# Patient Record
Sex: FEMALE | Race: WHITE | NOT HISPANIC OR LATINO | Employment: OTHER | URBAN - METROPOLITAN AREA
[De-identification: names, ages, dates, MRNs, and addresses within clinical notes are randomized per-mention and may not be internally consistent; named-entity substitution may affect disease eponyms.]

---

## 2017-04-06 PROBLEM — N18.6 END STAGE RENAL DISEASE: Status: ACTIVE | Noted: 2017-04-06

## 2017-04-06 PROBLEM — T82.590A MECHANICAL COMPLICATION OF ARTERIOVENOUS FISTULA SURGICALLY CREATED: Status: ACTIVE | Noted: 2017-04-06

## 2017-04-10 PROBLEM — T82.590A MECHANICAL COMPLICATION OF ARTERIOVENOUS FISTULA SURGICALLY CREATED: Status: ACTIVE | Noted: 2017-04-10

## 2017-09-01 ENCOUNTER — TELEPHONE (OUTPATIENT)
Dept: SMOKING CESSATION | Facility: CLINIC | Age: 52
End: 2017-09-01

## 2017-09-01 NOTE — TELEPHONE ENCOUNTER
I called patient to reschedule her intake as she was a no show but had to leave a message with her mother.

## 2017-12-12 PROBLEM — Z86.0100 HISTORY OF COLONIC POLYPS: Status: ACTIVE | Noted: 2017-12-12

## 2017-12-12 PROBLEM — Z86.010 HISTORY OF COLONIC POLYPS: Status: ACTIVE | Noted: 2017-12-12

## 2017-12-14 ENCOUNTER — CLINICAL SUPPORT (OUTPATIENT)
Dept: SMOKING CESSATION | Facility: CLINIC | Age: 52
End: 2017-12-14
Payer: COMMERCIAL

## 2017-12-14 DIAGNOSIS — F17.210 MODERATE SMOKER (20 OR LESS PER DAY): Primary | ICD-10-CM

## 2017-12-14 PROCEDURE — 99999 PR PBB SHADOW E&M-EST. PATIENT-LVL II: CPT | Mod: PBBFAC,,,

## 2017-12-14 PROCEDURE — 99404 PREV MED CNSL INDIV APPRX 60: CPT | Mod: S$GLB,,, | Performed by: GENERAL PRACTICE

## 2017-12-14 RX ORDER — DM/P-EPHED/ACETAMINOPH/DOXYLAM 30-7.5/3
LIQUID (ML) ORAL
Qty: 72 LOZENGE | Refills: 0 | Status: SHIPPED | OUTPATIENT
Start: 2017-12-14 | End: 2018-03-22

## 2017-12-14 RX ORDER — IBUPROFEN 200 MG
1 TABLET ORAL DAILY
Qty: 14 PATCH | Refills: 0 | Status: SHIPPED | OUTPATIENT
Start: 2017-12-14 | End: 2018-03-22 | Stop reason: DRUGHIGH

## 2017-12-18 ENCOUNTER — TELEPHONE (OUTPATIENT)
Dept: SMOKING CESSATION | Facility: CLINIC | Age: 52
End: 2017-12-18

## 2018-01-03 ENCOUNTER — CLINICAL SUPPORT (OUTPATIENT)
Dept: SMOKING CESSATION | Facility: CLINIC | Age: 53
End: 2018-01-03
Payer: COMMERCIAL

## 2018-01-03 DIAGNOSIS — F17.210 MODERATE SMOKER (20 OR LESS PER DAY): Primary | ICD-10-CM

## 2018-01-03 PROCEDURE — 99407 BEHAV CHNG SMOKING > 10 MIN: CPT | Mod: S$GLB,,, | Performed by: GENERAL PRACTICE

## 2018-01-03 PROCEDURE — 99999 PR PBB SHADOW E&M-EST. PATIENT-LVL II: CPT | Mod: PBBFAC,,,

## 2018-01-11 ENCOUNTER — CLINICAL SUPPORT (OUTPATIENT)
Dept: SMOKING CESSATION | Facility: CLINIC | Age: 53
End: 2018-01-11
Payer: COMMERCIAL

## 2018-01-11 DIAGNOSIS — F17.210 MODERATE SMOKER (20 OR LESS PER DAY): Primary | ICD-10-CM

## 2018-01-11 PROCEDURE — 99999 PR PBB SHADOW E&M-EST. PATIENT-LVL II: CPT | Mod: PBBFAC,,,

## 2018-01-11 PROCEDURE — 99407 BEHAV CHNG SMOKING > 10 MIN: CPT | Mod: S$GLB,,, | Performed by: GENERAL PRACTICE

## 2018-01-19 ENCOUNTER — CLINICAL SUPPORT (OUTPATIENT)
Dept: SMOKING CESSATION | Facility: CLINIC | Age: 53
End: 2018-01-19
Payer: COMMERCIAL

## 2018-01-19 DIAGNOSIS — F17.210 MODERATE SMOKER (20 OR LESS PER DAY): Primary | ICD-10-CM

## 2018-01-19 PROCEDURE — 99999 PR PBB SHADOW E&M-EST. PATIENT-LVL II: CPT | Mod: PBBFAC,,,

## 2018-01-19 PROCEDURE — 99406 BEHAV CHNG SMOKING 3-10 MIN: CPT | Mod: S$GLB,,, | Performed by: GENERAL PRACTICE

## 2018-01-25 ENCOUNTER — TELEPHONE (OUTPATIENT)
Dept: SMOKING CESSATION | Facility: CLINIC | Age: 53
End: 2018-01-25

## 2018-02-01 ENCOUNTER — TELEPHONE (OUTPATIENT)
Dept: SMOKING CESSATION | Facility: CLINIC | Age: 53
End: 2018-02-01

## 2018-02-21 ENCOUNTER — TELEPHONE (OUTPATIENT)
Dept: SMOKING CESSATION | Facility: CLINIC | Age: 53
End: 2018-02-21

## 2018-02-21 NOTE — TELEPHONE ENCOUNTER
Patient is currently smoking 6-8 cigarettes per day and can not come to group due to new job but will call me to schedule an [id number] for next week.

## 2018-03-22 ENCOUNTER — CLINICAL SUPPORT (OUTPATIENT)
Dept: SMOKING CESSATION | Facility: CLINIC | Age: 53
End: 2018-03-22
Payer: COMMERCIAL

## 2018-03-22 DIAGNOSIS — F17.210 MODERATE SMOKER (20 OR LESS PER DAY): Primary | ICD-10-CM

## 2018-03-22 PROCEDURE — 99407 BEHAV CHNG SMOKING > 10 MIN: CPT | Mod: S$GLB,,, | Performed by: GENERAL PRACTICE

## 2018-03-22 PROCEDURE — 99999 PR PBB SHADOW E&M-EST. PATIENT-LVL II: CPT | Mod: PBBFAC,,,

## 2018-03-22 RX ORDER — MICONAZOLE NITRATE 2 %
CREAM (GRAM) TOPICAL
Qty: 100 EACH | Refills: 0 | Status: SHIPPED | OUTPATIENT
Start: 2018-03-22 | End: 2019-04-03

## 2018-03-22 RX ORDER — IBUPROFEN 200 MG
1 TABLET ORAL DAILY
Qty: 14 PATCH | Refills: 0 | Status: SHIPPED | OUTPATIENT
Start: 2018-03-22 | End: 2018-09-14

## 2018-04-04 ENCOUNTER — TELEPHONE (OUTPATIENT)
Dept: SMOKING CESSATION | Facility: CLINIC | Age: 53
End: 2018-04-04

## 2018-12-07 ENCOUNTER — CLINICAL SUPPORT (OUTPATIENT)
Dept: SMOKING CESSATION | Facility: CLINIC | Age: 53
End: 2018-12-07
Payer: COMMERCIAL

## 2018-12-07 DIAGNOSIS — F17.200 NICOTINE DEPENDENCE: Primary | ICD-10-CM

## 2018-12-07 PROCEDURE — 99407 BEHAV CHNG SMOKING > 10 MIN: CPT | Mod: S$GLB,,,

## 2018-12-07 NOTE — PROGRESS NOTES
Called pt to f/u on her 3, 6, and 12 month smoking cessation quit status. Pt stated she is still smoking. Informed her she has benefits available and is able to rejoin. Pt not ready to make appointment. She will call back when ready. Informed her of benefit period, phone follow ups, and contact information. Will complete smart form and resolve episode.

## 2019-04-03 PROBLEM — F17.200 SMOKER: Status: ACTIVE | Noted: 2019-04-03

## 2019-04-18 PROBLEM — Z99.2 PERITONEAL DIALYSIS STATUS: Status: ACTIVE | Noted: 2019-04-18

## 2019-07-08 PROBLEM — N12 PYELONEPHRITIS: Status: ACTIVE | Noted: 2019-07-08

## 2019-07-08 PROBLEM — K65.9 PERITONITIS: Status: ACTIVE | Noted: 2019-07-08

## 2019-07-13 PROBLEM — R78.81 BACTEREMIA: Status: ACTIVE | Noted: 2019-07-13

## 2019-07-16 PROBLEM — B96.20 E. COLI PYELONEPHRITIS: Status: ACTIVE | Noted: 2019-07-08

## 2019-07-16 PROBLEM — B96.20 E COLI BACTEREMIA: Status: ACTIVE | Noted: 2019-07-13

## 2019-08-19 PROBLEM — Z87.891 FORMER SMOKER: Status: ACTIVE | Noted: 2019-08-19

## 2019-11-23 PROBLEM — L03.311 CELLULITIS OF ABDOMINAL WALL: Status: ACTIVE | Noted: 2019-11-23

## 2019-11-23 PROBLEM — I10 ESSENTIAL HYPERTENSION: Status: ACTIVE | Noted: 2019-11-23

## 2019-11-26 PROBLEM — L02.211 ABSCESS OF ABDOMINAL WALL: Status: ACTIVE | Noted: 2019-11-23

## 2020-06-17 PROBLEM — Z99.2 PERITONEAL DIALYSIS STATUS: Status: RESOLVED | Noted: 2019-04-18 | Resolved: 2020-06-17

## 2020-06-17 PROBLEM — L02.211 ABSCESS OF ABDOMINAL WALL: Status: RESOLVED | Noted: 2019-11-23 | Resolved: 2020-06-17

## 2020-08-03 PROBLEM — S62.322D: Status: ACTIVE | Noted: 2020-08-03

## 2020-08-03 PROBLEM — S62.022K CLOSED DISPLACED FRACTURE OF MIDDLE THIRD OF SCAPHOID OF LEFT WRIST WITH NONUNION: Status: ACTIVE | Noted: 2020-08-03

## 2020-08-03 PROBLEM — G56.02 LEFT CARPAL TUNNEL SYNDROME: Status: ACTIVE | Noted: 2020-08-03

## 2021-01-09 ENCOUNTER — IMMUNIZATION (OUTPATIENT)
Dept: FAMILY MEDICINE | Facility: CLINIC | Age: 56
End: 2021-01-09
Payer: MEDICARE

## 2021-01-09 DIAGNOSIS — Z23 NEED FOR VACCINATION: ICD-10-CM

## 2021-01-09 PROCEDURE — 91300 COVID-19, MRNA, LNP-S, PF, 30 MCG/0.3 ML DOSE VACCINE: CPT | Mod: PBBFAC,PO

## 2021-01-25 PROBLEM — Z86.010 HX OF COLONIC POLYPS: Status: ACTIVE | Noted: 2021-01-25

## 2021-01-25 PROBLEM — Z86.0100 HX OF COLONIC POLYPS: Status: ACTIVE | Noted: 2021-01-25

## 2021-01-30 ENCOUNTER — IMMUNIZATION (OUTPATIENT)
Dept: FAMILY MEDICINE | Facility: CLINIC | Age: 56
End: 2021-01-30
Payer: MEDICARE

## 2021-01-30 DIAGNOSIS — Z23 NEED FOR VACCINATION: Primary | ICD-10-CM

## 2021-01-30 PROCEDURE — 0002A COVID-19, MRNA, LNP-S, PF, 30 MCG/0.3 ML DOSE VACCINE: CPT | Mod: PBBFAC | Performed by: INTERNAL MEDICINE

## 2021-01-30 PROCEDURE — 91300 COVID-19, MRNA, LNP-S, PF, 30 MCG/0.3 ML DOSE VACCINE: CPT | Mod: PBBFAC | Performed by: INTERNAL MEDICINE

## 2021-06-03 ENCOUNTER — OFFICE VISIT (OUTPATIENT)
Dept: ORTHOPEDICS | Facility: CLINIC | Age: 56
End: 2021-06-03
Payer: MEDICARE

## 2021-06-03 ENCOUNTER — HOSPITAL ENCOUNTER (OUTPATIENT)
Dept: RADIOLOGY | Facility: HOSPITAL | Age: 56
Discharge: HOME | End: 2021-06-03
Attending: ORTHOPAEDIC SURGERY
Payer: MEDICARE

## 2021-06-03 VITALS — WEIGHT: 134 LBS | HEIGHT: 67 IN | BODY MASS INDEX: 21.03 KG/M2

## 2021-06-03 DIAGNOSIS — M79.631 RIGHT FOREARM PAIN: Primary | ICD-10-CM

## 2021-06-03 DIAGNOSIS — M79.631 RIGHT FOREARM PAIN: ICD-10-CM

## 2021-06-03 PROCEDURE — 3008F PR BODY MASS INDEX (BMI) DOCUMENTED: ICD-10-PCS | Mod: CPTII,S$GLB,, | Performed by: ORTHOPAEDIC SURGERY

## 2021-06-03 PROCEDURE — 99999 PR PBB SHADOW E&M-EST. PATIENT-LVL III: ICD-10-PCS | Mod: PBBFAC,,, | Performed by: ORTHOPAEDIC SURGERY

## 2021-06-03 PROCEDURE — 73090 XR FOREARM RIGHT: ICD-10-PCS | Mod: 26,RT,, | Performed by: RADIOLOGY

## 2021-06-03 PROCEDURE — 99499 UNLISTED E&M SERVICE: CPT | Mod: S$GLB,,, | Performed by: ORTHOPAEDIC SURGERY

## 2021-06-03 PROCEDURE — 99203 OFFICE O/P NEW LOW 30 MIN: CPT | Mod: S$GLB,,, | Performed by: ORTHOPAEDIC SURGERY

## 2021-06-03 PROCEDURE — 99499 RISK ADDL DX/OHS AUDIT: ICD-10-PCS | Mod: S$GLB,,, | Performed by: ORTHOPAEDIC SURGERY

## 2021-06-03 PROCEDURE — 1125F AMNT PAIN NOTED PAIN PRSNT: CPT | Mod: S$GLB,,, | Performed by: ORTHOPAEDIC SURGERY

## 2021-06-03 PROCEDURE — 73090 X-RAY EXAM OF FOREARM: CPT | Mod: TC,PO,RT

## 2021-06-03 PROCEDURE — 99999 PR PBB SHADOW E&M-EST. PATIENT-LVL III: CPT | Mod: PBBFAC,,, | Performed by: ORTHOPAEDIC SURGERY

## 2021-06-03 PROCEDURE — 1125F PR PAIN SEVERITY QUANTIFIED, PAIN PRESENT: ICD-10-PCS | Mod: S$GLB,,, | Performed by: ORTHOPAEDIC SURGERY

## 2021-06-03 PROCEDURE — 73090 X-RAY EXAM OF FOREARM: CPT | Mod: 26,RT,, | Performed by: RADIOLOGY

## 2021-06-03 PROCEDURE — 99203 PR OFFICE/OUTPT VISIT, NEW, LEVL III, 30-44 MIN: ICD-10-PCS | Mod: S$GLB,,, | Performed by: ORTHOPAEDIC SURGERY

## 2021-06-03 PROCEDURE — 3008F BODY MASS INDEX DOCD: CPT | Mod: CPTII,S$GLB,, | Performed by: ORTHOPAEDIC SURGERY

## 2021-06-09 ENCOUNTER — CLINICAL SUPPORT (OUTPATIENT)
Dept: URGENT CARE | Facility: CLINIC | Age: 56
End: 2021-06-09
Payer: MEDICARE

## 2021-06-09 DIAGNOSIS — Z11.52 ENCOUNTER FOR SCREENING FOR COVID-19: Primary | ICD-10-CM

## 2021-06-09 LAB
CTP QC/QA: YES
SARS-COV-2 RDRP RESP QL NAA+PROBE: NEGATIVE

## 2021-06-09 PROCEDURE — 99211 PR OFFICE/OUTPT VISIT, EST, LEVL I: ICD-10-PCS | Mod: S$GLB,CS,, | Performed by: FAMILY MEDICINE

## 2021-06-09 PROCEDURE — U0002: ICD-10-PCS | Mod: QW,S$GLB,, | Performed by: FAMILY MEDICINE

## 2021-06-09 PROCEDURE — U0002 COVID-19 LAB TEST NON-CDC: HCPCS | Mod: QW,S$GLB,, | Performed by: FAMILY MEDICINE

## 2021-06-09 PROCEDURE — 99211 OFF/OP EST MAY X REQ PHY/QHP: CPT | Mod: S$GLB,CS,, | Performed by: FAMILY MEDICINE

## 2021-07-14 PROBLEM — R06.02 SOB (SHORTNESS OF BREATH): Status: ACTIVE | Noted: 2021-07-14

## 2021-10-13 ENCOUNTER — IMMUNIZATION (OUTPATIENT)
Dept: FAMILY MEDICINE | Facility: CLINIC | Age: 56
End: 2021-10-13
Payer: MEDICARE

## 2021-10-13 DIAGNOSIS — Z23 NEED FOR VACCINATION: Primary | ICD-10-CM

## 2021-10-13 PROCEDURE — 91300 COVID-19, MRNA, LNP-S, PF, 30 MCG/0.3 ML DOSE VACCINE: CPT | Mod: PBBFAC | Performed by: INTERNAL MEDICINE

## 2021-10-13 PROCEDURE — 0003A COVID-19, MRNA, LNP-S, PF, 30 MCG/0.3 ML DOSE VACCINE: CPT | Mod: PBBFAC | Performed by: INTERNAL MEDICINE

## 2022-02-14 ENCOUNTER — TELEPHONE (OUTPATIENT)
Dept: SPINE | Facility: CLINIC | Age: 57
End: 2022-02-14
Payer: MEDICARE

## 2022-02-15 ENCOUNTER — TELEPHONE (OUTPATIENT)
Dept: SPINE | Facility: CLINIC | Age: 57
End: 2022-02-15
Payer: MEDICARE

## 2022-02-15 NOTE — TELEPHONE ENCOUNTER
KATHERINE FROM [place] WANTED TO LET YOU KNOW THAT THE PATIENT OF DR. LOYA'S BLOOD PRESSURE /47.   Patient returned call and left a message.  I returned her call [facility] to call [phone].

## 2022-02-25 ENCOUNTER — OFFICE VISIT (OUTPATIENT)
Dept: SPINE | Facility: CLINIC | Age: 57
End: 2022-02-25
Payer: MEDICARE

## 2022-02-25 VITALS
HEIGHT: 67 IN | SYSTOLIC BLOOD PRESSURE: 96 MMHG | BODY MASS INDEX: 17.54 KG/M2 | HEART RATE: 74 BPM | DIASTOLIC BLOOD PRESSURE: 57 MMHG | WEIGHT: 111.75 LBS

## 2022-02-25 DIAGNOSIS — M54.9 DORSALGIA, UNSPECIFIED: ICD-10-CM

## 2022-02-25 DIAGNOSIS — M54.16 LUMBAR RADICULOPATHY: ICD-10-CM

## 2022-02-25 DIAGNOSIS — M54.42 CHRONIC LEFT-SIDED LOW BACK PAIN WITH LEFT-SIDED SCIATICA: Primary | ICD-10-CM

## 2022-02-25 DIAGNOSIS — G89.29 CHRONIC BILATERAL LOW BACK PAIN WITHOUT SCIATICA: ICD-10-CM

## 2022-02-25 DIAGNOSIS — M54.50 CHRONIC BILATERAL LOW BACK PAIN WITHOUT SCIATICA: ICD-10-CM

## 2022-02-25 DIAGNOSIS — G89.29 CHRONIC LEFT-SIDED LOW BACK PAIN WITH LEFT-SIDED SCIATICA: Primary | ICD-10-CM

## 2022-02-25 PROCEDURE — 1159F MED LIST DOCD IN RCRD: CPT | Mod: CPTII,S$GLB,, | Performed by: PHYSICIAN ASSISTANT

## 2022-02-25 PROCEDURE — 3078F DIAST BP <80 MM HG: CPT | Mod: CPTII,S$GLB,, | Performed by: PHYSICIAN ASSISTANT

## 2022-02-25 PROCEDURE — 3008F PR BODY MASS INDEX (BMI) DOCUMENTED: ICD-10-PCS | Mod: CPTII,S$GLB,, | Performed by: PHYSICIAN ASSISTANT

## 2022-02-25 PROCEDURE — 3078F PR MOST RECENT DIASTOLIC BLOOD PRESSURE < 80 MM HG: ICD-10-PCS | Mod: CPTII,S$GLB,, | Performed by: PHYSICIAN ASSISTANT

## 2022-02-25 PROCEDURE — 99204 PR OFFICE/OUTPT VISIT, NEW, LEVL IV, 45-59 MIN: ICD-10-PCS | Mod: S$GLB,,, | Performed by: PHYSICIAN ASSISTANT

## 2022-02-25 PROCEDURE — 99999 PR PBB SHADOW E&M-EST. PATIENT-LVL V: ICD-10-PCS | Mod: PBBFAC,,, | Performed by: PHYSICIAN ASSISTANT

## 2022-02-25 PROCEDURE — 1159F PR MEDICATION LIST DOCUMENTED IN MEDICAL RECORD: ICD-10-PCS | Mod: CPTII,S$GLB,, | Performed by: PHYSICIAN ASSISTANT

## 2022-02-25 PROCEDURE — 1160F PR REVIEW ALL MEDS BY PRESCRIBER/CLIN PHARMACIST DOCUMENTED: ICD-10-PCS | Mod: CPTII,S$GLB,, | Performed by: PHYSICIAN ASSISTANT

## 2022-02-25 PROCEDURE — 3074F PR MOST RECENT SYSTOLIC BLOOD PRESSURE < 130 MM HG: ICD-10-PCS | Mod: CPTII,S$GLB,, | Performed by: PHYSICIAN ASSISTANT

## 2022-02-25 PROCEDURE — 99999 PR PBB SHADOW E&M-EST. PATIENT-LVL V: CPT | Mod: PBBFAC,,, | Performed by: PHYSICIAN ASSISTANT

## 2022-02-25 PROCEDURE — 1160F RVW MEDS BY RX/DR IN RCRD: CPT | Mod: CPTII,S$GLB,, | Performed by: PHYSICIAN ASSISTANT

## 2022-02-25 PROCEDURE — 99204 OFFICE O/P NEW MOD 45 MIN: CPT | Mod: S$GLB,,, | Performed by: PHYSICIAN ASSISTANT

## 2022-02-25 PROCEDURE — 3074F SYST BP LT 130 MM HG: CPT | Mod: CPTII,S$GLB,, | Performed by: PHYSICIAN ASSISTANT

## 2022-02-25 PROCEDURE — 3008F BODY MASS INDEX DOCD: CPT | Mod: CPTII,S$GLB,, | Performed by: PHYSICIAN ASSISTANT

## 2022-02-26 ENCOUNTER — HOSPITAL ENCOUNTER (OUTPATIENT)
Dept: RADIOLOGY | Facility: HOSPITAL | Age: 57
Discharge: HOME | End: 2022-02-26
Attending: PHYSICIAN ASSISTANT
Payer: MEDICARE

## 2022-02-26 DIAGNOSIS — G89.29 CHRONIC LEFT-SIDED LOW BACK PAIN WITH LEFT-SIDED SCIATICA: ICD-10-CM

## 2022-02-26 DIAGNOSIS — M54.42 CHRONIC LEFT-SIDED LOW BACK PAIN WITH LEFT-SIDED SCIATICA: ICD-10-CM

## 2022-02-26 DIAGNOSIS — M54.16 LUMBAR RADICULOPATHY: ICD-10-CM

## 2022-02-26 PROCEDURE — 72148 MRI LUMBAR SPINE W/O DYE: CPT | Mod: TC,PO

## 2022-02-26 PROCEDURE — 72148 MRI LUMBAR SPINE WITHOUT CONTRAST: ICD-10-PCS | Mod: 26,,, | Performed by: RADIOLOGY

## 2022-02-26 PROCEDURE — 72148 MRI LUMBAR SPINE W/O DYE: CPT | Mod: 26,,, | Performed by: RADIOLOGY

## 2022-02-28 ENCOUNTER — TELEPHONE (OUTPATIENT)
Dept: SPINE | Facility: CLINIC | Age: 57
End: 2022-02-28
Payer: MEDICARE

## 2022-02-28 NOTE — TELEPHONE ENCOUNTER
----- Message from Tatyana Whelan sent at 2/28/2022 10:46 AM CST -----  Contact: pt  Type: Needs Medical Advice    Who: Called: Pt     Best Call Back Number: [phone]    Inquiry/Question: Pt needs a call back she states she got her MRI done on 02/26 which is earlier than expected and wants to talk to nurse see if she can be seen sooner please call her to advise  Thank you~

## 2022-02-28 NOTE — TELEPHONE ENCOUNTER
----- Message from Lina Bateman sent at 2/28/2022  3:00 PM CST -----  Contact: patient  Type:  Patient Returning Call    Who Called:  patient  Who Left Message for Patient:  Sadie  Does the patient know what this is regarding?:    Best Call Back Number:  [phone]  Additional Information:

## 2022-02-28 NOTE — PROGRESS NOTES
Back and Spine Consult    Patient ID: Vee Navarro is a 56 y.o. female.    Chief Complaint   Patient presents with    Low-back Pain     Left-sided since kidney transplant 10-, constant, intensity changes, radiates down left leg to ankle.       Review of Systems   Constitutional: Negative for activity change, appetite change, chills, fever and unexpected weight change.   HENT: Negative for tinnitus, trouble swallowing and voice change.    Respiratory: Negative for apnea, cough, chest tightness and shortness of breath.    Cardiovascular: Negative for chest pain and palpitations.   Gastrointestinal: Negative for constipation, diarrhea, nausea and vomiting.   Genitourinary: Negative for difficulty urinating, dysuria, frequency and urgency.   Musculoskeletal: Positive for back pain and myalgias. Negative for gait problem, neck pain and neck stiffness.   Skin: Negative for wound.   Neurological: Negative for dizziness, tremors, seizures, facial asymmetry, speech difficulty, weakness, light-headedness, numbness and headaches.   Psychiatric/Behavioral: Negative for confusion and decreased concentration.       Past Medical History:   Diagnosis Date    Acid reflux     Dialysis patient         Encounter for blood transfusion     End stage kidney disease     Hypertension     Peritonitis      Social History     Socioeconomic History    Marital status: Single   Tobacco Use    Smoking status: Former Smoker     Packs/day: 0.25     Years: 35.00     Pack years: 8.75     Types: Cigarettes     Quit date: 2021     Years since quittin.1    Smokeless tobacco: Never Used    Tobacco comment: quit a couple of times previously   Substance and Sexual Activity    Alcohol use: No    Drug use: No    Sexual activity: Never     Family History   Problem Relation Age of Onset    Mental illness Brother     Heart disease Maternal Grandmother     Cancer Maternal Grandfather     Stroke Paternal Grandmother      Cancer Paternal Grandfather     No Known Problems Brother     No Known Problems Mother     Hypertension Father     Suicide Father      Review of patient's allergies indicates:   Allergen Reactions    Morphine      Other reaction(s): rash, vomiting       Current Outpatient Medications:     azaTHIOprine (IMURAN) 50 mg Tab, Take by mouth., Disp: , Rfl:     cinacalcet (SENSIPAR) 30 MG Tab, TAKE 1 TABLET BY MOUTH DAILY WITH FOOD OR AFTER a meal, Disp: , Rfl:     fludrocortisone (FLORINEF) 0.1 mg Tab, 0.2 mg., Disp: , Rfl:     multivitamin (THERAGRAN) per tablet, Take 1 tablet by mouth once daily., Disp: , Rfl:     mycophenolate (MYFORTIC) 360 MG TbEC, Take 360 mg by mouth 2 (two) times daily., Disp: , Rfl:     pantoprazole (PROTONIX) 40 MG tablet, Take 40 mg by mouth once daily., Disp: , Rfl:     pregabalin (LYRICA) 100 MG capsule, Take 100 mg by mouth once daily., Disp: , Rfl:     sulfamethoxazole-trimethoprim 800-160mg (BACTRIM DS) 800-160 mg Tab, Take 1 tablet by mouth on Monday, Wednesday, and Friday of every week to prevent PCP infection., Disp: , Rfl:     tacrolimus (PROGRAF) 1 MG Cap, TAKE 8 CAPSULES BY MOUTH 2 TIMES DAILY, Disp: , Rfl:     varenicline (CHANTIX) 1 mg Tab, Take 1 tablet (1 mg total) by mouth 2 (two) times daily., Disp: 60 tablet, Rfl: 2    amLODIPine (NORVASC) 5 MG tablet, 5 mg., Disp: , Rfl:     buprenorphine-naloxone 8-2 mg (SUBOXONE) 8-2 mg, SMARTSI.5-2 Strip(s) Sublingual Daily, Disp: , Rfl:     famotidine (PEPCID) 20 MG tablet, Take 20 mg by mouth 2 (two) times daily., Disp: , Rfl:     multivit with minerals/lutein (MULTIVITAMIN 50 PLUS ORAL), ONCE DAILY, Disp: , Rfl:     nystatin (MYCOSTATIN) 100,000 unit/mL suspension, take 5 ML BY MOUTH 3 TIMES DAILY TO prevent fungal infection, Disp: , Rfl:     ondansetron (ZOFRAN-ODT) 4 MG TbDL, DISSOLVE ONE TABLET BY MOUTH EVERY 8 HOURS AS NEEDED FOR NAUSEA, Disp: , Rfl:     polyethylene glycol (GLYCOLAX) 17 gram/dose powder, MIX  "1 CAPFUL (17 GRAMS) IN LIQUID AND DRINK ONCE DAILY AS DIRECTED FOR CONSTIPATION, Disp: , Rfl:     valGANciclovir (VALCYTE) 450 mg Tab, TAKE 1 TABLET BY MOUTH DAILY TO PREVENT CMV INFECTION, Disp: , Rfl:     Current Facility-Administered Medications:     sodium chloride 0.9% flush 10 mL, 10 mL, Intravenous, PRN, Faizan Jarrell MD    Facility-Administered Medications Ordered in Other Visits:     0.9%  NaCl infusion, 20 mL/hr, Intravenous, Continuous, Yeimy Hickman MD, Last Rate: 20 mL/hr at 09/17/18 0900, 20 mL/hr at 09/17/18 0900    0.9%  NaCl infusion, , Intravenous, Continuous, Albin Javier MD, Stopped at 08/03/20 1242    lidocaine (PF) 10 mg/ml (1%) injection 2 mg, 0.2 mL, Intradermal, Once, Albin Javier MD    Vitals:    02/25/22 0928   BP: (!) 96/57   BP Location: Right arm   Patient Position: Sitting   BP Method: Small (Automatic)   Pulse: 74   Weight: 50.7 kg (111 lb 12.4 oz)   Height: 5' 7" (1.702 m)       Physical Exam  Vitals and nursing note reviewed.   Constitutional:       Appearance: She is well-developed.   HENT:      Head: Normocephalic and atraumatic.   Eyes:      Pupils: Pupils are equal, round, and reactive to light.   Cardiovascular:      Rate and Rhythm: Normal rate.   Pulmonary:      Effort: Pulmonary effort is normal.   Musculoskeletal:         General: Normal range of motion.      Cervical back: Normal range of motion and neck supple.   Skin:     General: Skin is warm and dry.   Neurological:      Mental Status: She is alert and oriented to person, place, and time.      Coordination: Finger-Nose-Finger Test, Heel to Shin Test and Romberg Test normal.      Gait: Gait is intact. Tandem walk normal.      Deep Tendon Reflexes:      Reflex Scores:       Tricep reflexes are 2+ on the right side and 2+ on the left side.       Bicep reflexes are 2+ on the right side and 2+ on the left side.       Brachioradialis reflexes are 2+ on the right side and 2+ on the left side.       " Patellar reflexes are 2+ on the right side and 2+ on the left side.       Achilles reflexes are 2+ on the right side and 2+ on the left side.  Psychiatric:         Speech: Speech normal.         Behavior: Behavior normal.         Thought Content: Thought content normal.         Judgment: Judgment normal.         Neurologic Exam     Mental Status   Oriented to person, place, and time.   Oriented to person.   Oriented to place.   Oriented to time.   Follows 3 step commands.   Attention: normal. Concentration: normal.   Speech: speech is normal   Level of consciousness: alert  Knowledge: consistent with education.   Able to name object. Able to read. Able to repeat. Able to write. Normal comprehension.     Cranial Nerves     CN II   Visual acuity: normal  Right visual field deficit: none  Left visual field deficit: none     CN III, IV, VI   Pupils are equal, round, and reactive to light.  Right pupil: Size: 3 mm. Shape: regular. Reactivity: brisk. Consensual response: intact.   Left pupil: Size: 3 mm. Shape: regular. Reactivity: brisk. Consensual response: intact.   CN III: no CN III palsy  CN VI: no CN VI palsy  Nystagmus: none   Diplopia: none  Ophthalmoparesis: none  Conjugate gaze: present    CN V   Right facial sensation deficit: none  Left facial sensation deficit: none    CN VII   Right facial weakness: none  Left facial weakness: none    CN VIII   Hearing: intact    CN IX, X   CN IX normal.   CN X normal.     CN XI   Right sternocleidomastoid strength: normal  Left sternocleidomastoid strength: normal  Right trapezius strength: normal  Left trapezius strength: normal    CN XII   Fasciculations: absent  Tongue deviation: none    Motor Exam   Muscle bulk: normal  Overall muscle tone: normal  Right arm pronator drift: absent  Left arm pronator drift: absent    Strength   Right neck flexion: 5/5  Left neck flexion: 5/5  Right neck extension: 5/5  Left neck extension: 5/5  Right deltoid: 5/5  Left deltoid:  5/5  Right biceps: 5/5  Left biceps: 5/5  Right triceps: 5/5  Left triceps: 5/5  Right wrist flexion: 5/5  Left wrist flexion: 5/5  Right wrist extension: 5/5  Left wrist extension: 5/5  Right interossei: 5/5  Left interossei: 5/5  Right abdominals: 5/5  Left abdominals: 5/5  Right iliopsoas: 5/5  Left iliopsoas: 5/5  Right quadriceps: 5/5  Left quadriceps: 5/5  Right hamstrin/5  Left hamstrin/5  Right glutei: 5/5  Left glutei: 5/5  Right anterior tibial: 5/5  Left anterior tibial: 5/5  Right posterior tibial: 5  Left posterior tibial: 5  Right peroneal: 5  Left peroneal: 5  Right gastroc: 5/  Left gastroc: 5    Sensory Exam   Right arm light touch: normal  Left arm light touch: normal  Right leg light touch: normal  Left leg light touch: normal  Right arm vibration: normal  Left arm vibration: normal  Right arm pinprick: normal  Left arm pinprick: normal    Gait, Coordination, and Reflexes     Gait  Gait: normal    Coordination   Romberg: negative  Finger to nose coordination: normal  Heel to shin coordination: normal  Tandem walking coordination: normal    Tremor   Resting tremor: absent  Intention tremor: absent  Action tremor: absent    Reflexes   Right brachioradialis: 2+  Left brachioradialis: 2+  Right biceps: 2+  Left biceps: 2+  Right triceps: 2+  Left triceps: 2+  Right patellar: 2+  Left patellar: 2+  Right achilles: 2+  Left achilles: 2+  Right Rebollar: absent  Left Rebollar: absent  Right ankle clonus: absent  Left ankle clonus: absent      Provider dictation:    56 year old female former smoker with hypertension and who is s/p left kidney transplant 10-28-21 presents to discuss lower back and leg pain.  She had lumbar spine surgery in ; recovered well with no pain after.  Current pain started in 2021.  She has pain in the lower back with radiation into the left lateral thigh/ calf to the ankle.  She denies any numbness/ tingling.  She has tried medications, PT and 20 years  ago LYNSEY.    Current medications:  Tylenol, lyrica  Physical therapy:  Undergoing 2x per week  Interventional Procedures:  20 years ago; none recently     On exam, there is 5/5 strength with 2+ DTR and no sensory deficits.  Gait and station fluid.  Denies bowel/ bladder dysfunction.  Full range of motion of the upper and lower extremities. No pain with axial facet loading.  No SI joint pain on palpation.  No pain with lumbar flexion/ extension.    Xray lumbar spine from 2/9/22 reviewed.  There is levoscoliosis and degenerative changes.  No instability.    MRI lumbar spine from 2019 reviewed.  There is L3/4 facet hypertrophy.  L4/5 facet hypertrophy and left alteral recess disk hernia.  L5/S1 left broad disk with left foraminal narrowing.    Ms. Baxter has lower back and L5 radicualr pain due to L4/5 left lateral recess disk hernia and L5/S1 disk hernia/ facet arthropathy with left foraminal narrowing.  With no improvement after PT, I recommend obtaining MRI lumbar spine and furhter considering LYNSEY.  Follow up after imaging.  She is agreeable to plan.      Visit Diagnosis:  Chronic left-sided low back pain with left-sided sciatica  -     MRI Lumbar Spine Without Contrast; Future; Expected date: 02/25/2022    Chronic bilateral low back pain without sciatica  -     Ambulatory referral/consult to [facility]    Dorsalgia, unspecified    Lumbar radiculopathy  -     MRI Lumbar Spine Without Contrast; Future; Expected date: 02/25/2022        Total time spent counseling greater than fifty percent of total visit time.  Counseling included discussion regarding imaging findings, diagnosis possibilities, treatment options, risks and benefits.   The patient had many questions regarding the options and long-term effects.

## 2022-03-07 ENCOUNTER — OFFICE VISIT (OUTPATIENT)
Dept: SPINE | Facility: CLINIC | Age: 57
End: 2022-03-07
Payer: MEDICARE

## 2022-03-07 VITALS
WEIGHT: 111.75 LBS | HEART RATE: 75 BPM | BODY MASS INDEX: 17.54 KG/M2 | HEIGHT: 67 IN | DIASTOLIC BLOOD PRESSURE: 50 MMHG | SYSTOLIC BLOOD PRESSURE: 94 MMHG

## 2022-03-07 DIAGNOSIS — M47.816 LUMBAR SPONDYLOSIS: ICD-10-CM

## 2022-03-07 DIAGNOSIS — M41.9 SCOLIOSIS, UNSPECIFIED SCOLIOSIS TYPE, UNSPECIFIED SPINAL REGION: ICD-10-CM

## 2022-03-07 DIAGNOSIS — M54.16 LUMBAR RADICULOPATHY: Primary | ICD-10-CM

## 2022-03-07 PROCEDURE — 99999 PR PBB SHADOW E&M-EST. PATIENT-LVL IV: ICD-10-PCS | Mod: PBBFAC,,, | Performed by: PHYSICIAN ASSISTANT

## 2022-03-07 PROCEDURE — 3074F PR MOST RECENT SYSTOLIC BLOOD PRESSURE < 130 MM HG: ICD-10-PCS | Mod: CPTII,S$GLB,, | Performed by: PHYSICIAN ASSISTANT

## 2022-03-07 PROCEDURE — 1160F RVW MEDS BY RX/DR IN RCRD: CPT | Mod: CPTII,S$GLB,, | Performed by: PHYSICIAN ASSISTANT

## 2022-03-07 PROCEDURE — 3008F PR BODY MASS INDEX (BMI) DOCUMENTED: ICD-10-PCS | Mod: CPTII,S$GLB,, | Performed by: PHYSICIAN ASSISTANT

## 2022-03-07 PROCEDURE — 99999 PR PBB SHADOW E&M-EST. PATIENT-LVL IV: CPT | Mod: PBBFAC,,, | Performed by: PHYSICIAN ASSISTANT

## 2022-03-07 PROCEDURE — 3074F SYST BP LT 130 MM HG: CPT | Mod: CPTII,S$GLB,, | Performed by: PHYSICIAN ASSISTANT

## 2022-03-07 PROCEDURE — 3008F BODY MASS INDEX DOCD: CPT | Mod: CPTII,S$GLB,, | Performed by: PHYSICIAN ASSISTANT

## 2022-03-07 PROCEDURE — 1159F MED LIST DOCD IN RCRD: CPT | Mod: CPTII,S$GLB,, | Performed by: PHYSICIAN ASSISTANT

## 2022-03-07 PROCEDURE — 99214 OFFICE O/P EST MOD 30 MIN: CPT | Mod: S$GLB,,, | Performed by: PHYSICIAN ASSISTANT

## 2022-03-07 PROCEDURE — 99214 PR OFFICE/OUTPT VISIT, EST, LEVL IV, 30-39 MIN: ICD-10-PCS | Mod: S$GLB,,, | Performed by: PHYSICIAN ASSISTANT

## 2022-03-07 PROCEDURE — 3078F PR MOST RECENT DIASTOLIC BLOOD PRESSURE < 80 MM HG: ICD-10-PCS | Mod: CPTII,S$GLB,, | Performed by: PHYSICIAN ASSISTANT

## 2022-03-07 PROCEDURE — 1159F PR MEDICATION LIST DOCUMENTED IN MEDICAL RECORD: ICD-10-PCS | Mod: CPTII,S$GLB,, | Performed by: PHYSICIAN ASSISTANT

## 2022-03-07 PROCEDURE — 1160F PR REVIEW ALL MEDS BY PRESCRIBER/CLIN PHARMACIST DOCUMENTED: ICD-10-PCS | Mod: CPTII,S$GLB,, | Performed by: PHYSICIAN ASSISTANT

## 2022-03-07 PROCEDURE — 3078F DIAST BP <80 MM HG: CPT | Mod: CPTII,S$GLB,, | Performed by: PHYSICIAN ASSISTANT

## 2022-03-07 NOTE — PROGRESS NOTES
Back and Spine Follow Up    Patient ID: Vee Navarro is a 56 y.o. female.    Chief Complaint   Patient presents with    Follow-up     MRI results for low back pain.       Review of Systems   Constitutional: Negative for activity change, appetite change, chills, fever and unexpected weight change.   HENT: Negative for tinnitus, trouble swallowing and voice change.    Respiratory: Negative for apnea, cough, chest tightness and shortness of breath.    Cardiovascular: Negative for chest pain and palpitations.   Gastrointestinal: Negative for constipation, diarrhea, nausea and vomiting.   Genitourinary: Negative for difficulty urinating, dysuria, frequency and urgency.   Musculoskeletal: Positive for back pain and myalgias. Negative for gait problem, neck pain and neck stiffness.   Skin: Negative for wound.   Neurological: Negative for dizziness, tremors, seizures, facial asymmetry, speech difficulty, weakness, light-headedness, numbness and headaches.   Psychiatric/Behavioral: Negative for confusion and decreased concentration.       Past Medical History:   Diagnosis Date    Acid reflux     Dialysis patient         Encounter for blood transfusion     End stage kidney disease     Hypertension     Peritonitis      Social History     Socioeconomic History    Marital status: Single   Tobacco Use    Smoking status: Former Smoker     Packs/day: 0.25     Years: 35.00     Pack years: 8.75     Types: Cigarettes     Quit date: 2021     Years since quittin.1    Smokeless tobacco: Never Used    Tobacco comment: quit a couple of times previously   Substance and Sexual Activity    Alcohol use: No    Drug use: No    Sexual activity: Never     Family History   Problem Relation Age of Onset    Mental illness Brother     Heart disease Maternal Grandmother     Cancer Maternal Grandfather     Stroke Paternal Grandmother     Cancer Paternal Grandfather     No Known Problems Brother     No Known Problems  Mother     Hypertension Father     Suicide Father      Review of patient's allergies indicates:   Allergen Reactions    Morphine      Other reaction(s): rash, vomiting       Current Outpatient Medications:     amLODIPine (NORVASC) 5 MG tablet, 5 mg., Disp: , Rfl:     azaTHIOprine (IMURAN) 50 mg Tab, Take by mouth., Disp: , Rfl:     buprenorphine-naloxone 8-2 mg (SUBOXONE) 8-2 mg, SMARTSI.5-2 Strip(s) Sublingual Daily, Disp: , Rfl:     cinacalcet (SENSIPAR) 30 MG Tab, TAKE 1 TABLET BY MOUTH DAILY WITH FOOD OR AFTER a meal, Disp: , Rfl:     famotidine (PEPCID) 20 MG tablet, Take 20 mg by mouth 2 (two) times daily., Disp: , Rfl:     fludrocortisone (FLORINEF) 0.1 mg Tab, 0.2 mg., Disp: , Rfl:     multivit with minerals/lutein (MULTIVITAMIN 50 PLUS ORAL), ONCE DAILY, Disp: , Rfl:     multivitamin (THERAGRAN) per tablet, Take 1 tablet by mouth once daily., Disp: , Rfl:     mycophenolate (MYFORTIC) 360 MG TbEC, Take 360 mg by mouth 2 (two) times daily., Disp: , Rfl:     nystatin (MYCOSTATIN) 100,000 unit/mL suspension, take 5 ML BY MOUTH 3 TIMES DAILY TO prevent fungal infection, Disp: , Rfl:     ondansetron (ZOFRAN-ODT) 4 MG TbDL, DISSOLVE ONE TABLET BY MOUTH EVERY 8 HOURS AS NEEDED FOR NAUSEA, Disp: , Rfl:     pantoprazole (PROTONIX) 40 MG tablet, Take 40 mg by mouth once daily., Disp: , Rfl:     polyethylene glycol (GLYCOLAX) 17 gram/dose powder, MIX 1 CAPFUL (17 GRAMS) IN LIQUID AND DRINK ONCE DAILY AS DIRECTED FOR CONSTIPATION, Disp: , Rfl:     pregabalin (LYRICA) 100 MG capsule, Take 100 mg by mouth once daily., Disp: , Rfl:     sulfamethoxazole-trimethoprim 800-160mg (BACTRIM DS) 800-160 mg Tab, Take 1 tablet by mouth on Monday, Wednesday, and Friday of every week to prevent PCP infection., Disp: , Rfl:     tacrolimus (PROGRAF) 1 MG Cap, TAKE 8 CAPSULES BY MOUTH 2 TIMES DAILY, Disp: , Rfl:     valGANciclovir (VALCYTE) 450 mg Tab, TAKE 1 TABLET BY MOUTH DAILY TO PREVENT CMV INFECTION, Disp: ,  "Rfl:     varenicline (CHANTIX) 1 mg Tab, Take 1 tablet (1 mg total) by mouth 2 (two) times daily., Disp: 60 tablet, Rfl: 2    Current Facility-Administered Medications:     sodium chloride 0.9% flush 10 mL, 10 mL, Intravenous, PRN, Faizan Jarrell MD    Facility-Administered Medications Ordered in Other Visits:     0.9%  NaCl infusion, 20 mL/hr, Intravenous, Continuous, Yeimy Hickman MD, Last Rate: 20 mL/hr at 09/17/18 0900, 20 mL/hr at 09/17/18 0900    0.9%  NaCl infusion, , Intravenous, Continuous, Albin Javier MD, Stopped at 08/03/20 1242    lidocaine (PF) 10 mg/ml (1%) injection 2 mg, 0.2 mL, Intradermal, Once, Albin Javier MD    Vitals:    03/07/22 1356   BP: (!) 94/50   BP Location: Right arm   Patient Position: Sitting   BP Method: Small (Automatic)   Pulse: 75   Weight: 50.7 kg (111 lb 12.4 oz)   Height: 5' 7" (1.702 m)       Physical Exam  Vitals and nursing note reviewed.   Constitutional:       Appearance: She is well-developed.   HENT:      Head: Normocephalic and atraumatic.   Eyes:      Pupils: Pupils are equal, round, and reactive to light.   Cardiovascular:      Rate and Rhythm: Normal rate.   Pulmonary:      Effort: Pulmonary effort is normal.   Musculoskeletal:         General: Normal range of motion.      Cervical back: Normal range of motion and neck supple.   Skin:     General: Skin is warm and dry.   Neurological:      Mental Status: She is alert and oriented to person, place, and time.      Coordination: Finger-Nose-Finger Test, Heel to Shin Test and Romberg Test normal.      Gait: Gait is intact. Tandem walk normal.      Deep Tendon Reflexes:      Reflex Scores:       Tricep reflexes are 2+ on the right side and 2+ on the left side.       Bicep reflexes are 2+ on the right side and 2+ on the left side.       Brachioradialis reflexes are 2+ on the right side and 2+ on the left side.       Patellar reflexes are 2+ on the right side and 2+ on the left side.       Achilles " reflexes are 2+ on the right side and 2+ on the left side.  Psychiatric:         Speech: Speech normal.         Behavior: Behavior normal.         Thought Content: Thought content normal.         Judgment: Judgment normal.         Neurologic Exam     Mental Status   Oriented to person, place, and time.   Oriented to person.   Oriented to place.   Oriented to time.   Follows 3 step commands.   Attention: normal. Concentration: normal.   Speech: speech is normal   Level of consciousness: alert  Knowledge: consistent with education.   Able to name object. Able to read. Able to repeat. Able to write. Normal comprehension.     Cranial Nerves     CN II   Visual acuity: normal  Right visual field deficit: none  Left visual field deficit: none     CN III, IV, VI   Pupils are equal, round, and reactive to light.  Right pupil: Size: 3 mm. Shape: regular. Reactivity: brisk. Consensual response: intact.   Left pupil: Size: 3 mm. Shape: regular. Reactivity: brisk. Consensual response: intact.   CN III: no CN III palsy  CN VI: no CN VI palsy  Nystagmus: none   Diplopia: none  Ophthalmoparesis: none  Conjugate gaze: present    CN V   Right facial sensation deficit: none  Left facial sensation deficit: none    CN VII   Right facial weakness: none  Left facial weakness: none    CN VIII   Hearing: intact    CN IX, X   CN IX normal.   CN X normal.     CN XI   Right sternocleidomastoid strength: normal  Left sternocleidomastoid strength: normal  Right trapezius strength: normal  Left trapezius strength: normal    CN XII   Fasciculations: absent  Tongue deviation: none    Motor Exam   Muscle bulk: normal  Overall muscle tone: normal  Right arm pronator drift: absent  Left arm pronator drift: absent    Strength   Right neck flexion: 5/5  Left neck flexion: 5/5  Right neck extension: 5/5  Left neck extension: 5/5  Right deltoid: 5/5  Left deltoid: 5/5  Right biceps: 5/5  Left biceps: 5/5  Right triceps: 5/5  Left triceps: 5/5  Right wrist  flexion: 5/5  Left wrist flexion: 5/5  Right wrist extension: 5/5  Left wrist extension: 5/5  Right interossei: 5/5  Left interossei: 5/5  Right abdominals: 5/5  Left abdominals: 5/5  Right iliopsoas: 5/5  Left iliopsoas: 5/5  Right quadriceps: 5/5  Left quadriceps: 5/5  Right hamstrin/  Left hamstrin/  Right glutei: 5/  Left glutei: 5  Right anterior tibial: 5/5  Left anterior tibial: 5/5  Right posterior tibial: 5/5  Left posterior tibial: 5/  Right peroneal: 55  Left peroneal: 5  Right gastroc: 5  Left gastroc:     Sensory Exam   Right arm light touch: normal  Left arm light touch: normal  Right leg light touch: normal  Left leg light touch: normal  Right arm vibration: normal  Left arm vibration: normal  Right arm pinprick: normal  Left arm pinprick: normal    Gait, Coordination, and Reflexes     Gait  Gait: normal    Coordination   Romberg: negative  Finger to nose coordination: normal  Heel to shin coordination: normal  Tandem walking coordination: normal    Tremor   Resting tremor: absent  Intention tremor: absent  Action tremor: absent    Reflexes   Right brachioradialis: 2+  Left brachioradialis: 2+  Right biceps: 2+  Left biceps: 2+  Right triceps: 2+  Left triceps: 2+  Right patellar: 2+  Left patellar: 2+  Right achilles: 2+  Left achilles: 2+  Right Rebollar: absent  Left Rebollar: absent  Right ankle clonus: absent  Left ankle clonus: absent      Provider dictation:    56 year old female former smoker with hypertension and who is s/p left kidney transplant 10-28-21 presents to discuss lower back and leg pain after undergoing recent imaging.   She had lumbar spine surgery in ; recovered well with no pain after.  Current pain started in 2021.  She has pain in the lower back with radiation into the left lateral thigh/ calf to the ankle.  She denies any numbness/ tingling.  She has tried medications, PT and 20 years ago LYNSEY.    No significant changes since last  visit.    Current medications:  Tylenol, lyrica  Physical therapy:  Undergoing 2x per week  Interventional Procedures:  20 years ago; none recently     On exam, there is 5/5 strength with 2+ DTR and no sensory deficits.  Gait and station fluid.  Denies bowel/ bladder dysfunction.  Full range of motion of the upper and lower extremities. No pain with axial facet loading.  No SI joint pain on palpation.  No pain with lumbar flexion/ extension.    Xray lumbar spine from 2/9/22 reviewed.  There is levoscoliosis and degenerative changes.  No instability.    MRI lumbar spine from 2019 reviewed.  There is L3/4 facet hypertrophy.  L4/5 facet hypertrophy and left alteral recess disk hernia.  L5/S1 left broad disk with left foraminal narrowing.    MRI lumbar spine from 2/26/22 reviewed.  There is levoscoliosis and degenerative changes.  L2/3 facet arthropathy with bilateral facet effusion.  L3/4 facet arthropathy with left facet effusion.  L4/5 facet arthropathy  With left foraminal narrowing and bilateral facet effusion.  L5/S1 facet hypertrophy and broad based disk with with left foraminal narrowing.    Ms. Baxter has lower back and L5 radicualr pain due to L4/5 and L5/S1 spondylosis with significant left foraminal narrowing at each level. With no improvement after PT, I recommend considering L5/S1 interlaminar LYNSEY with spread to the left at L4/5, L5/S1 as the foramen are very narrow at L4/5 and L5/S1.  I explained the procedure in detail to her.  She is very anxious and worried about being sedated instead of full anesthesia.  She would like to speak with and have a consult with pain management prior to committing to LYNSEY.  We will arrange for the appointment.  Follow up after the injection as needed.        Visit Diagnosis:  Lumbar radiculopathy  -     Ambulatory referral/consult to Pain Clinic; Future; Expected date: 03/14/2022    Lumbar spondylosis  -     Ambulatory referral/consult to Pain Clinic; Future; Expected date:  03/14/2022    Scoliosis, unspecified scoliosis type, unspecified spinal region        Total time spent counseling greater than fifty percent of total visit time.  Counseling included discussion regarding imaging findings, diagnosis possibilities, treatment options, risks and benefits.   The patient had many questions regarding the options and long-term effects.

## 2022-03-10 ENCOUNTER — TELEPHONE (OUTPATIENT)
Dept: RHEUMATOLOGY | Facility: CLINIC | Age: 57
End: 2022-03-10
Payer: MEDICARE

## 2022-03-16 ENCOUNTER — TELEPHONE (OUTPATIENT)
Dept: PAIN MEDICINE | Facility: CLINIC | Age: 57
End: 2022-03-16
Payer: MEDICARE

## 2022-03-16 NOTE — TELEPHONE ENCOUNTER
----- Message from Brenda Montaño sent at 3/16/2022 10:31 AM CDT -----  Contact: Pt at [phone]  Type:  Sooner Apoointment Request    Caller is requesting a sooner appointment.  Caller declined first available appointment listed below.  Caller will not accept being placed on the waitlist and is requesting a message be sent to doctor.    Name of Caller:    When is the first available appointment?  3/31/22  Symptoms:  Back Pain needs injection  Best Call Back Number:  [phone]  Additional Information:  Pt would like to be seen sooner than 3/31 due to back pain and needing her injection. Please call back and advise

## 2022-03-31 ENCOUNTER — OFFICE VISIT (OUTPATIENT)
Dept: PAIN MEDICINE | Facility: CLINIC | Age: 57
End: 2022-03-31
Payer: MEDICARE

## 2022-03-31 VITALS
BODY MASS INDEX: 18.75 KG/M2 | SYSTOLIC BLOOD PRESSURE: 99 MMHG | OXYGEN SATURATION: 95 % | WEIGHT: 119.5 LBS | DIASTOLIC BLOOD PRESSURE: 55 MMHG | HEART RATE: 68 BPM | HEIGHT: 67 IN

## 2022-03-31 DIAGNOSIS — M54.16 LUMBAR RADICULOPATHY: ICD-10-CM

## 2022-03-31 DIAGNOSIS — M47.816 LUMBAR SPONDYLOSIS: ICD-10-CM

## 2022-03-31 PROCEDURE — 1159F PR MEDICATION LIST DOCUMENTED IN MEDICAL RECORD: ICD-10-PCS | Mod: CPTII,S$GLB,, | Performed by: ANESTHESIOLOGY

## 2022-03-31 PROCEDURE — 3078F DIAST BP <80 MM HG: CPT | Mod: CPTII,S$GLB,, | Performed by: ANESTHESIOLOGY

## 2022-03-31 PROCEDURE — 99204 PR OFFICE/OUTPT VISIT, NEW, LEVL IV, 45-59 MIN: ICD-10-PCS | Mod: S$GLB,,, | Performed by: ANESTHESIOLOGY

## 2022-03-31 PROCEDURE — 99999 PR PBB SHADOW E&M-EST. PATIENT-LVL IV: ICD-10-PCS | Mod: PBBFAC,,, | Performed by: ANESTHESIOLOGY

## 2022-03-31 PROCEDURE — 3074F PR MOST RECENT SYSTOLIC BLOOD PRESSURE < 130 MM HG: ICD-10-PCS | Mod: CPTII,S$GLB,, | Performed by: ANESTHESIOLOGY

## 2022-03-31 PROCEDURE — 3008F BODY MASS INDEX DOCD: CPT | Mod: CPTII,S$GLB,, | Performed by: ANESTHESIOLOGY

## 2022-03-31 PROCEDURE — 3008F PR BODY MASS INDEX (BMI) DOCUMENTED: ICD-10-PCS | Mod: CPTII,S$GLB,, | Performed by: ANESTHESIOLOGY

## 2022-03-31 PROCEDURE — 1160F RVW MEDS BY RX/DR IN RCRD: CPT | Mod: CPTII,S$GLB,, | Performed by: ANESTHESIOLOGY

## 2022-03-31 PROCEDURE — 3078F PR MOST RECENT DIASTOLIC BLOOD PRESSURE < 80 MM HG: ICD-10-PCS | Mod: CPTII,S$GLB,, | Performed by: ANESTHESIOLOGY

## 2022-03-31 PROCEDURE — 99999 PR PBB SHADOW E&M-EST. PATIENT-LVL IV: CPT | Mod: PBBFAC,,, | Performed by: ANESTHESIOLOGY

## 2022-03-31 PROCEDURE — 1160F PR REVIEW ALL MEDS BY PRESCRIBER/CLIN PHARMACIST DOCUMENTED: ICD-10-PCS | Mod: CPTII,S$GLB,, | Performed by: ANESTHESIOLOGY

## 2022-03-31 PROCEDURE — 3074F SYST BP LT 130 MM HG: CPT | Mod: CPTII,S$GLB,, | Performed by: ANESTHESIOLOGY

## 2022-03-31 PROCEDURE — 1159F MED LIST DOCD IN RCRD: CPT | Mod: CPTII,S$GLB,, | Performed by: ANESTHESIOLOGY

## 2022-03-31 PROCEDURE — 99204 OFFICE O/P NEW MOD 45 MIN: CPT | Mod: S$GLB,,, | Performed by: ANESTHESIOLOGY

## 2022-03-31 NOTE — PROGRESS NOTES
[facility] New Patient Evaluation    Referred by: Una Izquierdo PA-C  Reason for referral: back pain    CC:   Chief Complaint   Patient presents with    Back Pain      No flowsheet data found.    HPI:   Vee Navarro is a 56 y.o. female who presents with back pain.  He has had this pain since 2021. Today her pain is constant, sharp over the left side of her lower back with intermittent radiation down the left.  Pain can range between 3-9/10.  Denies any numbness or weakness     History:    Current Outpatient Medications:     amLODIPine (NORVASC) 5 MG tablet, 5 mg., Disp: , Rfl:     azaTHIOprine (IMURAN) 50 mg Tab, Take by mouth., Disp: , Rfl:     buprenorphine-naloxone 8-2 mg (SUBOXONE) 8-2 mg, SMARTSI.5-2 Strip(s) Sublingual Daily, Disp: , Rfl:     cinacalcet (SENSIPAR) 30 MG Tab, TAKE 1 TABLET BY MOUTH DAILY WITH FOOD OR AFTER a meal, Disp: , Rfl:     famotidine (PEPCID) 20 MG tablet, Take 20 mg by mouth 2 (two) times daily., Disp: , Rfl:     fludrocortisone (FLORINEF) 0.1 mg Tab, 0.2 mg., Disp: , Rfl:     multivit with minerals/lutein (MULTIVITAMIN 50 PLUS ORAL), ONCE DAILY, Disp: , Rfl:     multivitamin (THERAGRAN) per tablet, Take 1 tablet by mouth once daily., Disp: , Rfl:     mycophenolate (MYFORTIC) 360 MG TbEC, Take 360 mg by mouth 2 (two) times daily., Disp: , Rfl:     nystatin (MYCOSTATIN) 100,000 unit/mL suspension, take 5 ML BY MOUTH 3 TIMES DAILY TO prevent fungal infection, Disp: , Rfl:     ondansetron (ZOFRAN-ODT) 4 MG TbDL, DISSOLVE ONE TABLET BY MOUTH EVERY 8 HOURS AS NEEDED FOR NAUSEA, Disp: , Rfl:     pantoprazole (PROTONIX) 40 MG tablet, Take 40 mg by mouth once daily., Disp: , Rfl:     polyethylene glycol (GLYCOLAX) 17 gram/dose powder, MIX 1 CAPFUL (17 GRAMS) IN LIQUID AND DRINK ONCE DAILY AS DIRECTED FOR CONSTIPATION, Disp: , Rfl:     pregabalin (LYRICA) 100 MG capsule, Take 100 mg by mouth once daily., Disp: , Rfl:     sulfamethoxazole-trimethoprim  800-160mg (BACTRIM DS) 800-160 mg Tab, Take 1 tablet by mouth on Monday, Wednesday, and Friday of every week to prevent PCP infection., Disp: , Rfl:     tacrolimus (PROGRAF) 1 MG Cap, TAKE 8 CAPSULES BY MOUTH 2 TIMES DAILY, Disp: , Rfl:     valGANciclovir (VALCYTE) 450 mg Tab, TAKE 1 TABLET BY MOUTH DAILY TO PREVENT CMV INFECTION, Disp: , Rfl:     varenicline (CHANTIX) 1 mg Tab, Take 1 tablet (1 mg total) by mouth 2 (two) times daily. (Patient taking differently: Take 1 mg by mouth 2 (two) times daily. HAS TO BE APOTEX : DO NOT GIVE PAR MANUFACTURED MEDICATION), Disp: 60 tablet, Rfl: 2    Current Facility-Administered Medications:     sodium chloride 0.9% flush 10 mL, 10 mL, Intravenous, PRN, Faizan Jarrell MD    Facility-Administered Medications Ordered in Other Visits:     0.9%  NaCl infusion, 20 mL/hr, Intravenous, Continuous, Yeimy Hickman MD, Last Rate: 20 mL/hr at 09/17/18 0900, 20 mL/hr at 09/17/18 0900    0.9%  NaCl infusion, , Intravenous, Continuous, Albin Javier MD, Stopped at 08/03/20 1242    lidocaine (PF) 10 mg/ml (1%) injection 2 mg, 0.2 mL, Intradermal, Once, Albin Javier MD    Past Medical History:   Diagnosis Date    Acid reflux     Dialysis patient     Tu Th     Encounter for blood transfusion     End stage kidney disease     Hypertension     Peritonitis        Past Surgical History:   Procedure Laterality Date    arm surgery Left     BACK SURGERY  1982    CARPAL TUNNEL RELEASE Left 8/3/2020    Procedure: RELEASE, CARPAL TUNNEL;  Surgeon: MIKO Fishman MD;  Location: [facility];  Service: Orthopedics;  Laterality: Left;    COLONOSCOPY N/A 12/12/2017    Procedure: COLONOSCOPY;  Surgeon: Elpidio Casillas Jr., MD;  Location: [facility];  Service: Endoscopy;  Laterality: N/A;    COLONOSCOPY N/A 1/25/2021    Procedure: COLONOSCOPY;  Surgeon: Elpidio Casillas Jr., MD;  Location: [facility];  Service: Endoscopy;  Laterality: N/A;    CORONARY ANGIOGRAPHY  N/A 2021    Procedure: ANGIOGRAM, CORONARY ARTERY;  Surgeon: Corey Aguilar MD;  Location: [facility] CATH;  Service: Cardiovascular;  Laterality: N/A;    DIALYSIS FISTULA CREATION      FOOT SURGERY  2019    KIDNEY STONE SURGERY  1983    KIDNEY TRANSPLANT Right 10/2021    LAPAROSCOPIC REVISION OF PROCEDURE INVOLVING PERITONEAL DIALYSIS CATHETER N/A 2018    Procedure: REVISION OF PROCEDURE INVOLVING PERITONEAL DIALYSIS CATHETER, LAPAROSCOPIC;  Surgeon: Kannan Piedra MD;  Location: [facility] OR;  Service: General;  Laterality: N/A;    LEFT HEART CATHETERIZATION Left 2021    Procedure: Left heart cath;  Surgeon: Corey Aguilar MD;  Location: [facility] CATH;  Service: Cardiovascular;  Laterality: Left;    NEUROMA SURGERY Left     knee    OPEN REDUCTION AND INTERNAL FIXATION (ORIF) OF FRACTURE OF DISTAL RADIUS Left 8/3/2020    Procedure: ORIF, FRACTURE,/ BONE GRAFT/ ORIF Scaphoid;  Surgeon: MIKO Fishman MD;  Location: [facility];  Service: Orthopedics;  Laterality: Left;  Dr. Fishman completed his surgery at 1159    OTHER SURGICAL HISTORY      peritoneal catheter move X2    PERITONEAL CATHETER REMOVAL N/A 2019    Procedure: REMOVAL, CATHETER, DIALYSIS, PERITONEAL;  Surgeon: Edilson Sherman MD;  Location: [facility] OR;  Service: General;  Laterality: N/A;    PLACEMENT PERITONEAL DIALYSIS CATHETER         Family History   Problem Relation Age of Onset    Mental illness Brother     Heart disease Maternal Grandmother     Cancer Maternal Grandfather     Stroke Paternal Grandmother     Cancer Paternal Grandfather     No Known Problems Brother     No Known Problems Mother     Hypertension Father     Suicide Father        Social History     Socioeconomic History    Marital status: Single   Tobacco Use    Smoking status: Former Smoker     Packs/day: 0.25     Years: 35.00     Pack years: 8.75     Types: Cigarettes     Quit date: 2021     Years since quittin.2    Smokeless  "tobacco: Never Used    Tobacco comment: quit a couple of times previously   Substance and Sexual Activity    Alcohol use: No    Drug use: No    Sexual activity: Never       Review of patient's allergies indicates:   Allergen Reactions    Morphine      Other reaction(s): rash, vomiting       Review of Systems:  General ROS: negative for - fever  Psychological ROS: negative for - hostility  Hematological and Lymphatic ROS: negative for - bleeding problems  Endocrine ROS: negative for - unexpected weight changes  Respiratory ROS: no cough, shortness of breath, or wheezing  Cardiovascular ROS: no chest pain or dyspnea on exertion  Gastrointestinal ROS: no abdominal pain, change in bowel habits, or black or bloody stools  Musculoskeletal ROS: negative for - muscular weakness  Neurological ROS: negative for - numbness/tingling  Dermatological ROS: negative for rash    Physical Exam:  Vitals:    03/31/22 1032   BP: (!) 99/55   Pulse: 68   SpO2: 95%   Weight: 54.2 kg (119 lb 7.8 oz)   Height: 5' 7" (1.702 m)   PainSc:   4   PainLoc: Back     Body mass index is 18.71 kg/m².    Gen: NAD  Gait: gait intact  Psych:  Mood appropriate for given condition  HEENT: eyes anicteric   GI: Abd soft  CV: RRR  Lungs: breathing unlabored   ROM: limited AROM of the L spine in all planes, full ROM at ankles, knees and hips  Lumbar flexion 90 degrees, extension 50 degrees, side bending 30 degrees.    Sensation: intact to light touch in all dermatomes tested from L2-S1 bilaterally  Tone: normal in the b/l knees and hips   Skin: intact  Extremities: No edema in b/l ankles or hands  Provacative tests:        Right Left   L2/3 Iliacus Hip flexion  5  5   L3/4 Qudratus Femoris Knee Extension  5  5   L4/5 Tib Anterior Ankle Dorsiflexion   5  5   L5/S1 Extensor Hallicus Longus Great toe extension  5  5                 S1/S2 Gastroc/Soleus Plantar Flexion  5  5       Imaging:  MRI lumbar spine 2/26/22  FINDINGS:  The marrow signal is diffusely " decreased and heterogeneous.  This is similar to what was seen on MRI from 2019.     L1/2: There are no significant degenerative changes at this level.  There is compression of the L1 vertebral body new from 2019 but without evidence of edema within the vertebral body.  L2/3: There are no significant degenerative changes at this level.  L3/4: There are no significant degenerative changes at this level.  L4/5: There is a circumferential disc bulge resulting in severe narrowing of both neural foramina and mild narrowing of the central canal.  This is similar to prior exam.  L5/S1: There is a circumferential disc bulge with small annular tear resulting in severe narrowing of bilateral neural foramina.  There is a hemangioma within the L5 vertebral body.  There is rotatory scoliosis of the lumbar spine progressed from prior exam.     There is evidence of polycystic kidney disease.    Labs:  BMP  Lab Results   Component Value Date     07/14/2021    K 5.3 (H) 07/14/2021    CL 96 07/14/2021    CO2 33 (H) 07/14/2021    BUN 36 (H) 07/14/2021    CREATININE 6.12 (H) 07/14/2021    CALCIUM 8.2 (L) 07/14/2021    ANIONGAP 11 07/14/2021    ESTGFRAFRICA 8 (A) 07/14/2021    EGFRNONAA 7 (A) 07/14/2021     Lab Results   Component Value Date    ALT 8 07/14/2021    AST 28 07/14/2021    ALKPHOS 103 07/14/2021    BILITOT 0.6 07/14/2021       Assessment:   Problem List Items Addressed This Visit    None     Visit Diagnoses     Lumbar radiculopathy        Lumbar spondylosis              56 y.o. year old female with PMH polycystic kidney disease status post renal transplant on 10/28/2021 who presents with back pain.  He has had this pain since October 2021. Today her pain is constant, sharp over the left side of her lower back with intermittent radiation down the left.  Pain can range between 3-9/10.  Denies any numbness or weakness     - on exam she has full strength and intact sensation to light touch  - I independently reviewed her  lumbar MRI is consistent with severe multilevel bilateral facet arthropathy, L4-5 circumferential disc bulge resulting in severe narrowing of both neural foramina and mild narrowing of the central canal, L5-S1 circumferential disc bulge with small annular tear resulting in severe narrowing of bilateral neural foramina  - she has a history of lumbar back surgery which 17 years old and judging by the MRI it appears it was a left L5-S1 laminectomy  - her pain is severe and limiting her mobility interfering with her ADLs.  She has multifactorial disease in her spine contributing her pain but I think her multilevel bulging discs with foraminal and lateral recess narrowing is the 1st target to address  - I offered her L4-5 LYNSEY to the left.  She has an upcoming knee surgery in the middle of April and will touch base with the orthopedist to see when it is best to get an epidural injection and follow up with us as needed when she decides    : Not applicable    Kaden Hidalgo M.D.  Interventional Pain Medicine / Anesthesiology    This note was completed with dictation software and grammatical errors may exist.

## 2022-04-07 ENCOUNTER — LAB VISIT (OUTPATIENT)
Dept: LAB | Facility: HOSPITAL | Age: 57
End: 2022-04-07
Attending: NURSE PRACTITIONER
Payer: MEDICARE

## 2022-04-07 ENCOUNTER — OFFICE VISIT (OUTPATIENT)
Dept: NEUROLOGY | Facility: CLINIC | Age: 57
End: 2022-04-07
Payer: MEDICARE

## 2022-04-07 VITALS
SYSTOLIC BLOOD PRESSURE: 107 MMHG | DIASTOLIC BLOOD PRESSURE: 60 MMHG | HEIGHT: 67 IN | RESPIRATION RATE: 18 BRPM | WEIGHT: 117.38 LBS | BODY MASS INDEX: 18.42 KG/M2 | HEART RATE: 71 BPM

## 2022-04-07 DIAGNOSIS — R41.3 SHORT-TERM MEMORY LOSS: ICD-10-CM

## 2022-04-07 DIAGNOSIS — R41.3 SHORT-TERM MEMORY LOSS: Primary | ICD-10-CM

## 2022-04-07 LAB
FOLATE SERPL-MCNC: 6.2 NG/ML (ref 4–24)
TSH SERPL DL<=0.005 MIU/L-ACNC: 1.39 UIU/ML (ref 0.4–4)
VIT B12 SERPL-MCNC: 211 PG/ML (ref 210–950)

## 2022-04-07 PROCEDURE — 99205 PR OFFICE/OUTPT VISIT, NEW, LEVL V, 60-74 MIN: ICD-10-PCS | Mod: S$GLB,,, | Performed by: NURSE PRACTITIONER

## 2022-04-07 PROCEDURE — 3074F PR MOST RECENT SYSTOLIC BLOOD PRESSURE < 130 MM HG: ICD-10-PCS | Mod: CPTII,S$GLB,, | Performed by: NURSE PRACTITIONER

## 2022-04-07 PROCEDURE — 82746 ASSAY OF FOLIC ACID SERUM: CPT | Performed by: NURSE PRACTITIONER

## 2022-04-07 PROCEDURE — 84443 ASSAY THYROID STIM HORMONE: CPT | Performed by: NURSE PRACTITIONER

## 2022-04-07 PROCEDURE — 3074F SYST BP LT 130 MM HG: CPT | Mod: CPTII,S$GLB,, | Performed by: NURSE PRACTITIONER

## 2022-04-07 PROCEDURE — 1160F RVW MEDS BY RX/DR IN RCRD: CPT | Mod: CPTII,S$GLB,, | Performed by: NURSE PRACTITIONER

## 2022-04-07 PROCEDURE — 1159F PR MEDICATION LIST DOCUMENTED IN MEDICAL RECORD: ICD-10-PCS | Mod: CPTII,S$GLB,, | Performed by: NURSE PRACTITIONER

## 2022-04-07 PROCEDURE — 3078F DIAST BP <80 MM HG: CPT | Mod: CPTII,S$GLB,, | Performed by: NURSE PRACTITIONER

## 2022-04-07 PROCEDURE — 99205 OFFICE O/P NEW HI 60 MIN: CPT | Mod: S$GLB,,, | Performed by: NURSE PRACTITIONER

## 2022-04-07 PROCEDURE — 82607 VITAMIN B-12: CPT | Performed by: NURSE PRACTITIONER

## 2022-04-07 PROCEDURE — 3078F PR MOST RECENT DIASTOLIC BLOOD PRESSURE < 80 MM HG: ICD-10-PCS | Mod: CPTII,S$GLB,, | Performed by: NURSE PRACTITIONER

## 2022-04-07 PROCEDURE — 3008F PR BODY MASS INDEX (BMI) DOCUMENTED: ICD-10-PCS | Mod: CPTII,S$GLB,, | Performed by: NURSE PRACTITIONER

## 2022-04-07 PROCEDURE — 99999 PR PBB SHADOW E&M-EST. PATIENT-LVL V: ICD-10-PCS | Mod: PBBFAC,,, | Performed by: NURSE PRACTITIONER

## 2022-04-07 PROCEDURE — 99999 PR PBB SHADOW E&M-EST. PATIENT-LVL V: CPT | Mod: PBBFAC,,, | Performed by: NURSE PRACTITIONER

## 2022-04-07 PROCEDURE — 1160F PR REVIEW ALL MEDS BY PRESCRIBER/CLIN PHARMACIST DOCUMENTED: ICD-10-PCS | Mod: CPTII,S$GLB,, | Performed by: NURSE PRACTITIONER

## 2022-04-07 PROCEDURE — 3008F BODY MASS INDEX DOCD: CPT | Mod: CPTII,S$GLB,, | Performed by: NURSE PRACTITIONER

## 2022-04-07 PROCEDURE — 1159F MED LIST DOCD IN RCRD: CPT | Mod: CPTII,S$GLB,, | Performed by: NURSE PRACTITIONER

## 2022-04-07 PROCEDURE — 36415 COLL VENOUS BLD VENIPUNCTURE: CPT | Mod: PO | Performed by: NURSE PRACTITIONER

## 2022-04-07 PROCEDURE — 86592 SYPHILIS TEST NON-TREP QUAL: CPT | Performed by: NURSE PRACTITIONER

## 2022-04-07 NOTE — ASSESSMENT & PLAN NOTE
Patient is a 57 y/o female with complaints of short term memory loss.   Onset ~ 1 year ago  There are no reports of hallucinations, behavioral changes, recent falls, urinary incontinence. She does report mild sleep disturbances and endorses mild depression but does not believe she needs to take medication for it.   MMSE today 29/30   - MCI vs normal aging process?  Obtain updated MRI brain scan. Last one in 2016  Obtain serologies  Discussed role vs expectation of cognitive enhancing medications at length.    - not warranted at this time  Consider referral for NP testing in the future.   Discussed ways to promote brain stimulation.   There are no safety concerns.

## 2022-04-07 NOTE — PROGRESS NOTES
NEUROLOGY  Outpatient CONSULT    [facility]  [address] [facility], [place] [address]  [phone] (office) / [phone] (fax)    Patient Name:  Vee Navarro  :  1965  MR #:  [id number]  Acct #:  [id number]    Date of Neurology Consult: 2022  Name of Provider: CIRILO Hinds    Other Physicians:  Evelina Gunn MD (Primary Care Physician); Jing Jimenes FNP (Referring)      Chief Complaint: Memory Loss (Short term memory ) and Gait Problem      History of Present Illness (HPI):  Vee Navarro is a right handed 56 y.o. female with a PMHX of ESRD, HTN (controlled), Kidney transplant in Oct 2021.   Patient is here today for memory loss. She is solo at today's visit. She is concerned about short term memory. She prides on not ever being a forgetful person. She often misplaces items or forgets things that are told to her. She lives at home with her mother and 2 young boys.     Patient's highest level of education completed was clinton in college. 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 She is working part time. The onset of memory issues began about 1 year ago. She struggles more short term memory loss. She believes that she is more impatient now as she lives with her 86 y/o mother and 2 teenage boys. She endorses mild depression but doesn't believe she needs medications. She denies suicidal ideation. She denies hallucinations. She states her sleep is fair and now has to sleep on her side which is a change for her. She will often wake up in the night but is able to fall back asleep fine. She believes she does well with executive function. She is managing her finances and medications well and without issues. She denies issues with hygiene and able to perform ADLs without assistance. She reports word finding difficulty but denies comprehension issues. She is hard of hearing and has seen an audiologist.   She denies issues with  object recognition. She denies urinary incontinence. Her last fall was bout 5-6 months. She is still driving and denies recent MVAs. She does report getting lost near her neighborhood stating she couldn't remember where she lived. She reports this immediately after dialysis and not feeling well that day.   She works part time for her business and helps with house work.         Past Medical, Surgical, Family & Social History:   Past Medical History:   Diagnosis Date    Acid reflux     Dialysis patient     Tu Th Sa    Encounter for blood transfusion     End stage kidney disease     Hypertension     Peritonitis       Past Surgical History:   Procedure Laterality Date    arm surgery Left     BACK SURGERY  1982    CARPAL TUNNEL RELEASE Left 8/3/2020    Procedure: RELEASE, CARPAL TUNNEL;  Surgeon: MIKO Fishman MD;  Location: [facility];  Service: Orthopedics;  Laterality: Left;    COLONOSCOPY N/A 12/12/2017    Procedure: COLONOSCOPY;  Surgeon: Elpidio Casillas Jr., MD;  Location: [facility] ENDO;  Service: Endoscopy;  Laterality: N/A;    COLONOSCOPY N/A 1/25/2021    Procedure: COLONOSCOPY;  Surgeon: Elpidio Casillas Jr., MD;  Location: [facility] ENDO;  Service: Endoscopy;  Laterality: N/A;    CORONARY ANGIOGRAPHY N/A 7/14/2021    Procedure: ANGIOGRAM, CORONARY ARTERY;  Surgeon: Corey Aguilar MD;  Location: [facility] CATH;  Service: Cardiovascular;  Laterality: N/A;    DIALYSIS FISTULA CREATION      FOOT SURGERY  03/2019    KIDNEY STONE SURGERY  1983    KIDNEY TRANSPLANT Right 10/2021    LAPAROSCOPIC REVISION OF PROCEDURE INVOLVING PERITONEAL DIALYSIS CATHETER N/A 9/17/2018    Procedure: REVISION OF PROCEDURE INVOLVING PERITONEAL DIALYSIS CATHETER, LAPAROSCOPIC;  Surgeon: Kannan Piedra MD;  Location: [facility] OR;  Service: General;  Laterality: N/A;    LEFT HEART CATHETERIZATION Left 7/14/2021    Procedure: Left heart cath;  Surgeon: Corey Aguilar MD;  Location: [facility] CATH;  Service: Cardiovascular;   Laterality: Left;    NEUROMA SURGERY Left     knee    OPEN REDUCTION AND INTERNAL FIXATION (ORIF) OF FRACTURE OF DISTAL RADIUS Left 8/3/2020    Procedure: ORIF, FRACTURE,/ BONE GRAFT/ ORIF Scaphoid;  Surgeon: MIKO Fishman MD;  Location: [facility];  Service: Orthopedics;  Laterality: Left;  Dr. Fishman completed his surgery at 1159    OTHER SURGICAL HISTORY      peritoneal catheter move X2    PERITONEAL CATHETER REMOVAL N/A 11/23/2019    Procedure: REMOVAL, CATHETER, DIALYSIS, PERITONEAL;  Surgeon: Edilson Sherman MD;  Location: [facility];  Service: General;  Laterality: N/A;    PLACEMENT PERITONEAL DIALYSIS CATHETER  2011     Family History   Problem Relation Age of Onset    Mental illness Brother     Heart disease Maternal Grandmother     Cancer Maternal Grandfather     Stroke Paternal Grandmother     Cancer Paternal Grandfather     No Known Problems Brother     No Known Problems Mother     Hypertension Father     Suicide Father      Alcohol use:  reports no history of alcohol use.   (Of note, 0.6 oz = 1 beer or 6 oz = 10 beers).  Tobacco use:  reports that she quit smoking about 15 months ago. Her smoking use included cigarettes. She has a 8.75 pack-year smoking history. She has never used smokeless tobacco.  Street drug use:  reports no history of drug use.  Allergies: Morphine.    Home Medications:     Current Outpatient Medications:     cinacalcet (SENSIPAR) 30 MG Tab, TAKE 1 TABLET BY MOUTH DAILY WITH FOOD OR AFTER a meal, Disp: , Rfl:     famotidine (PEPCID) 20 MG tablet, Take 20 mg by mouth 2 (two) times daily., Disp: , Rfl:     multivit with minerals/lutein (MULTIVITAMIN 50 PLUS ORAL), ONCE DAILY, Disp: , Rfl:     multivitamin (THERAGRAN) per tablet, Take 1 tablet by mouth once daily., Disp: , Rfl:     mycophenolate (MYFORTIC) 360 MG TbEC, Take 360 mg by mouth 2 (two) times daily., Disp: , Rfl:     ondansetron (ZOFRAN-ODT) 4 MG TbDL, DISSOLVE ONE TABLET BY MOUTH EVERY 8 HOURS AS NEEDED  FOR NAUSEA, Disp: , Rfl:     pantoprazole (PROTONIX) 40 MG tablet, Take 40 mg by mouth once daily., Disp: , Rfl:     pregabalin (LYRICA) 100 MG capsule, Take 100 mg by mouth once daily., Disp: , Rfl:     tacrolimus (PROGRAF) 1 MG Cap, TAKE 8 CAPSULES BY MOUTH 2 TIMES DAILY, Disp: , Rfl:     valGANciclovir (VALCYTE) 450 mg Tab, TAKE 1 TABLET BY MOUTH DAILY TO PREVENT CMV INFECTION, Disp: , Rfl:     varenicline (CHANTIX) 1 mg Tab, Take 1 tablet (1 mg total) by mouth 2 (two) times daily. (Patient taking differently: Take 1 mg by mouth 2 (two) times daily. HAS TO BE APOTEX : DO NOT GIVE PAR MANUFACTURED MEDICATION), Disp: 60 tablet, Rfl: 2    amLODIPine (NORVASC) 5 MG tablet, 5 mg., Disp: , Rfl:     azaTHIOprine (IMURAN) 50 mg Tab, Take by mouth., Disp: , Rfl:     buprenorphine-naloxone 8-2 mg (SUBOXONE) 8-2 mg, SMARTSI.5-2 Strip(s) Sublingual Daily, Disp: , Rfl:     fludrocortisone (FLORINEF) 0.1 mg Tab, 0.2 mg., Disp: , Rfl:     nystatin (MYCOSTATIN) 100,000 unit/mL suspension, take 5 ML BY MOUTH 3 TIMES DAILY TO prevent fungal infection, Disp: , Rfl:     polyethylene glycol (GLYCOLAX) 17 gram/dose powder, MIX 1 CAPFUL (17 GRAMS) IN LIQUID AND DRINK ONCE DAILY AS DIRECTED FOR CONSTIPATION, Disp: , Rfl:     sulfamethoxazole-trimethoprim 800-160mg (BACTRIM DS) 800-160 mg Tab, Take 1 tablet by mouth on Monday, Wednesday, and Friday of every week to prevent PCP infection., Disp: , Rfl:     Current Facility-Administered Medications:     sodium chloride 0.9% flush 10 mL, 10 mL, Intravenous, PRN, Faizan Jarrell MD    Facility-Administered Medications Ordered in Other Visits:     0.9%  NaCl infusion, 20 mL/hr, Intravenous, Continuous, Yeimy Hickman MD, Last Rate: 20 mL/hr at 18 0900, 20 mL/hr at 18 0900    0.9%  NaCl infusion, , Intravenous, Continuous, Albin Javier MD, Stopped at 20 1242    lidocaine (PF) 10 mg/ml (1%) injection 2 mg, 0.2 mL, Intradermal, Once,  "Albin Javier MD    Physical Examination:  /60 (BP Location: Right arm, Patient Position: Sitting, BP Method: Medium (Automatic))   Pulse 71   Resp 18   Ht 5' 7" (1.702 m)   Wt 53.3 kg (117 lb 6.3 oz)   LMP  (LMP Unknown)   BMI 18.39 kg/m²   MMSE 4/7/2022   What is the (year), (season), (date), (day), (month)? 5   Where are we (state), (country), (town or city), (hospital), (floor)? 5   Name 3 common objects (eg. "apple", "table", "sukh"). Take 1 second to say each. Then ask the patient to repeat all 3. Give 1 point for each correct answer. Then repeat them until he/she learns all 3. Count trials and record. 3   Serial 7's backwards. Stop after 5 answers. (100,93,86,79,72) or alternatively  spell "WORLD" backwards. (D..L..R..O..W). The score is the number of letters in correct order. 5   Ask for the 3 common objects named earlier in the exam. Give 1 point for each correct answer. 2   Name a "pencil" and "watch." 2   Repeat the following: "No ifs, ands, or buts." 1   Follow a 3-stage command: "Take a paper in your right hand, fold it in half, & put it on the floor." 3   Read and obey the following: (see paper exam) 1   Write a sentence. 1   Copy the following design: (see paper exam) 1   Total MMSE Score 29   Some recent data might be hidden       GENERAL:  General appearance: Well, non-toxic appearing.  No apparent distress.  Neck: supple.  .    MENTAL STATUS:  Alertness, attention span & concentration: normal.  Language: normal.  Orientation to self, place & time:  normal.  Memory, recent & remote: normal.  Fund of knowledge: normal.  MMSE:29/30    SPEECH:  Clear and fluent.  Follows complex commands.    CRANIAL NERVES:  Cranial Nerves II-XII were examined.  II - Visual fields: normal.  III, IV, VI: PERRL, EOMI, No ptosis, No nystagmus.  V - Facial sensation: normal.  VII - Face symmetry & mobility: Due to Covid-19 recommended precautions, patient wearing facial mask; mouth and nose not " examined.  VIII - Hearing: normal.  IX, X - Palate: Due to Covid-19 recommended precautions, patient wearing facial mask; mouth and nose not examined.  XI - Shoulder shrug: normal.  XII - Tongue protrusion: Due to Covid-19 recommended precautions, patient wearing facial mask; mouth and nose not examined.    GROSS MOTOR:  Gait & station: mildly antaglic  Tone: normal.  Abnormal movements: none.  Finger-nose: normal.  Rapid alternating movements: normal.  Pronator drift: normal      MUSCLE STRENGTH:     Fascics Atrophy RIGHT    LEFT Atrophy Fascics     5 Biceps 5       5 Triceps 5       5 Forearm.Pr. 5                4 Iliopsoas flex    4       5 Hip Abduct 5       5 Hip Adduct 5       4+ Quads 4+       4+ Hams 4+       5 Dorsiflex 5       5 Plantar Flex 5                REFLEXES:    RIGHT Reflex   LEFT   2+ Biceps 2+   2+ Brachiorad. 2+        1+ Patellar 1+     SENSORY:  Light touch: Normal throughout.             Diagnostic Data Reviewed:     MRI Brain 2016:  Findings: The diffusion-weighted images are negative for acute diffuse ischemia.  There does appear to be some T2 shine through artifact present for example periventricular, high left parietal.  No mass effect, layering hemorrhage or overt hydrocephalus is appreciated.  There is symmetric overall appearance of the sulcal, gyral margins.  There is symmetric overall appearance of the soft tissues and orbital structures.  Unremarkable overall appearance of the vascular flow voids.  The left vertebral artery appears developmentally dominant.  No significant paranasal sinus mucosal thickening or layering fluid levels are appreciated. Mild volume loss and chronic appearing periventricular white matter changes are appreciated. Correlate overall with the patient's clinical findings and laboratory values.  IMPRESSION:    No mass effect, layering hemorrhage or evidence of acute ischemia is appreciated.  No acute intracranial changes are appreciated.  There do appear to  be some scattered chronic appearing periventricular white matter changes bilaterally which could be seen with processes including chronic gliosis, microvascular ischemia with the pattern less typical for demyelination appearance.          Assessment and Plan:  Vee Navarro is a 56 y.o. female.    Problem List Items Addressed This Visit        Neuro    Short-term memory loss - Primary    Current Assessment & Plan     Patient is a 55 y/o female with complaints of short term memory loss.   Onset ~ 1 year ago  There are no reports of hallucinations, behavioral changes, recent falls, urinary incontinence. She does report mild sleep disturbances and endorses mild depression but does not believe she needs to take medication for it.   MMSE today 29/30   - MCI vs normal aging process?  Obtain updated MRI brain scan. Last one in 2016  Obtain serologies  Discussed role vs expectation of cognitive enhancing medications at length.    - not warranted at this time  Consider referral for NP testing in the future.   Discussed ways to promote brain stimulation.   There are no safety concerns.                                Important to note, also  has a past medical history of Acid reflux, Dialysis patient, Encounter for blood transfusion, End stage kidney disease, Hypertension, and Peritonitis.            The patient will return to clinic in 6 months.        All questions were answered and patient is comfortable with the plan.       Thank you very much for the opportunity to assist in this patient's care.    If you have any questions or concerns, please do not hesitate to contact me at any time.    Sincerely,     CIRILO Hinds  [facility] [place]         I spent a total of 69 minutes on the day of the visit.This includes face to face time and non-face to face time preparing to see the patient (eg, review of tests), Obtaining and/or reviewing separately obtained history, Documenting clinical  information in the electronic or other health record, Independently interpreting resultsand communicating results to the patient/family/caregiver, or Care coordination.

## 2022-04-08 LAB — RPR SER QL: NORMAL

## 2022-04-11 ENCOUNTER — TELEPHONE (OUTPATIENT)
Dept: NEUROLOGY | Facility: CLINIC | Age: 57
End: 2022-04-11
Payer: MEDICARE

## 2022-04-11 DIAGNOSIS — I10 ESSENTIAL HYPERTENSION: Primary | ICD-10-CM

## 2022-04-11 NOTE — TELEPHONE ENCOUNTER
Spoke with patient regarding lab results per Jacquelyn Ramos NP. Patient v/u and would like to know if she can take the shots herself, since she did well on them.

## 2022-04-11 NOTE — TELEPHONE ENCOUNTER
Spoke with patient regarding the B-12 shot informed her that KAVITHA Hinds is okay with her getting shots, and was wondering is her PCP prescribed this previously. Patient states that she doesn't remember who prescribed the the B-12 shots

## 2022-04-11 NOTE — TELEPHONE ENCOUNTER
----- Message from KAVITHA Lynn sent at 4/11/2022  8:22 AM CDT -----  All blood work looks OK except her B12 is on the low side. I recommend she start B12 supplements.   1000 mcg 1 tablet daily

## 2022-04-11 NOTE — TELEPHONE ENCOUNTER
----- Message from Babak Braden sent at 4/11/2022 12:52 PM CDT -----  Regarding: ret call  Type:  Patient Returning Call    Who Called:  Vee    Who Left Message for Patient:  Corina    Does the patient know what this is regarding?:  yes    Best Call Back Number:  [phone]    Additional Information:

## 2022-04-12 RX ORDER — CYANOCOBALAMIN 1000 UG/ML
INJECTION, SOLUTION INTRAMUSCULAR; SUBCUTANEOUS
Qty: 10 ML | Refills: 3 | Status: SHIPPED | OUTPATIENT
Start: 2022-04-12 | End: 2022-07-05

## 2022-04-12 NOTE — TELEPHONE ENCOUNTER
in [place].  She can do the B-12 shots to herself.  OK to send.  Also, want  Labs to check her liver functions as she had elevations previously.  Nephrologist said to get from PCP. Will have done at "[facility].

## 2022-04-12 NOTE — TELEPHONE ENCOUNTER
"Spoke with patient regarding message sent per KAVITHA Hinds, "If she wants shots she should go through PCP. If she wants to take oral then she can get this from her pharmacy. Its over the counter. " Patient would like for message to be sent to PCP to get B-12 shots rx  "

## 2022-04-28 ENCOUNTER — HOSPITAL ENCOUNTER (OUTPATIENT)
Dept: RADIOLOGY | Facility: HOSPITAL | Age: 57
Discharge: HOME | End: 2022-04-28
Attending: NURSE PRACTITIONER
Payer: MEDICARE

## 2022-04-28 DIAGNOSIS — R41.3 SHORT-TERM MEMORY LOSS: ICD-10-CM

## 2022-04-28 PROCEDURE — 70551 MRI BRAIN WITHOUT CONTRAST: ICD-10-PCS | Mod: 26,,, | Performed by: RADIOLOGY

## 2022-04-28 PROCEDURE — 70551 MRI BRAIN STEM W/O DYE: CPT | Mod: 26,,, | Performed by: RADIOLOGY

## 2022-04-28 PROCEDURE — 70551 MRI BRAIN STEM W/O DYE: CPT | Mod: TC,PO

## 2022-04-29 ENCOUNTER — TELEPHONE (OUTPATIENT)
Dept: NEUROLOGY | Facility: CLINIC | Age: 57
End: 2022-04-29
Payer: MEDICARE

## 2022-04-29 NOTE — TELEPHONE ENCOUNTER
----- Message from KAVITHA Lynn sent at 4/29/2022  4:30 PM CDT -----  Please inform the patient that her MRI brain scan did not report abnormality. There is reported mild volume loss (shrinkage) which can happen with age.

## 2022-06-08 ENCOUNTER — OFFICE VISIT (OUTPATIENT)
Dept: OTOLARYNGOLOGY | Facility: CLINIC | Age: 57
End: 2022-06-08
Payer: MEDICARE

## 2022-06-08 ENCOUNTER — OFFICE VISIT (OUTPATIENT)
Dept: RHEUMATOLOGY | Facility: CLINIC | Age: 57
End: 2022-06-08
Payer: MEDICARE

## 2022-06-08 ENCOUNTER — LAB VISIT (OUTPATIENT)
Dept: LAB | Facility: HOSPITAL | Age: 57
End: 2022-06-08
Attending: INTERNAL MEDICINE
Payer: MEDICARE

## 2022-06-08 VITALS — WEIGHT: 118 LBS | SYSTOLIC BLOOD PRESSURE: 98 MMHG | BODY MASS INDEX: 19.64 KG/M2 | DIASTOLIC BLOOD PRESSURE: 58 MMHG

## 2022-06-08 VITALS
SYSTOLIC BLOOD PRESSURE: 120 MMHG | DIASTOLIC BLOOD PRESSURE: 60 MMHG | TEMPERATURE: 99 F | BODY MASS INDEX: 19.47 KG/M2 | WEIGHT: 116.88 LBS | HEIGHT: 65 IN

## 2022-06-08 DIAGNOSIS — R76.8 RHEUMATOID FACTOR POSITIVE: ICD-10-CM

## 2022-06-08 DIAGNOSIS — H61.23 BILATERAL IMPACTED CERUMEN: Primary | ICD-10-CM

## 2022-06-08 DIAGNOSIS — M19.90 OSTEOARTHRITIS, UNSPECIFIED OSTEOARTHRITIS TYPE, UNSPECIFIED SITE: ICD-10-CM

## 2022-06-08 DIAGNOSIS — R76.8 RHEUMATOID FACTOR POSITIVE: Primary | ICD-10-CM

## 2022-06-08 DIAGNOSIS — H92.02 REFERRED OTALGIA OF LEFT EAR: ICD-10-CM

## 2022-06-08 PROCEDURE — 3074F PR MOST RECENT SYSTOLIC BLOOD PRESSURE < 130 MM HG: ICD-10-PCS | Mod: CPTII,S$GLB,, | Performed by: NURSE PRACTITIONER

## 2022-06-08 PROCEDURE — 3078F DIAST BP <80 MM HG: CPT | Mod: CPTII,S$GLB,, | Performed by: INTERNAL MEDICINE

## 2022-06-08 PROCEDURE — 99202 PR OFFICE/OUTPT VISIT, NEW, LEVL II, 15-29 MIN: ICD-10-PCS | Mod: S$GLB,,, | Performed by: INTERNAL MEDICINE

## 2022-06-08 PROCEDURE — 36415 COLL VENOUS BLD VENIPUNCTURE: CPT | Performed by: INTERNAL MEDICINE

## 2022-06-08 PROCEDURE — 1159F PR MEDICATION LIST DOCUMENTED IN MEDICAL RECORD: ICD-10-PCS | Mod: CPTII,S$GLB,, | Performed by: NURSE PRACTITIONER

## 2022-06-08 PROCEDURE — 69210 REMOVE IMPACTED EAR WAX UNI: CPT | Mod: S$GLB,,, | Performed by: NURSE PRACTITIONER

## 2022-06-08 PROCEDURE — 3074F SYST BP LT 130 MM HG: CPT | Mod: CPTII,S$GLB,, | Performed by: NURSE PRACTITIONER

## 2022-06-08 PROCEDURE — 1159F PR MEDICATION LIST DOCUMENTED IN MEDICAL RECORD: ICD-10-PCS | Mod: CPTII,S$GLB,, | Performed by: INTERNAL MEDICINE

## 2022-06-08 PROCEDURE — 3074F SYST BP LT 130 MM HG: CPT | Mod: CPTII,S$GLB,, | Performed by: INTERNAL MEDICINE

## 2022-06-08 PROCEDURE — 1160F PR REVIEW ALL MEDS BY PRESCRIBER/CLIN PHARMACIST DOCUMENTED: ICD-10-PCS | Mod: CPTII,S$GLB,, | Performed by: NURSE PRACTITIONER

## 2022-06-08 PROCEDURE — 99203 OFFICE O/P NEW LOW 30 MIN: CPT | Mod: 25,S$GLB,, | Performed by: NURSE PRACTITIONER

## 2022-06-08 PROCEDURE — 3078F PR MOST RECENT DIASTOLIC BLOOD PRESSURE < 80 MM HG: ICD-10-PCS | Mod: CPTII,S$GLB,, | Performed by: NURSE PRACTITIONER

## 2022-06-08 PROCEDURE — 99999 PR PBB SHADOW E&M-EST. PATIENT-LVL III: ICD-10-PCS | Mod: PBBFAC,,, | Performed by: NURSE PRACTITIONER

## 2022-06-08 PROCEDURE — 3078F PR MOST RECENT DIASTOLIC BLOOD PRESSURE < 80 MM HG: ICD-10-PCS | Mod: CPTII,S$GLB,, | Performed by: INTERNAL MEDICINE

## 2022-06-08 PROCEDURE — 3008F BODY MASS INDEX DOCD: CPT | Mod: CPTII,S$GLB,, | Performed by: NURSE PRACTITIONER

## 2022-06-08 PROCEDURE — 1159F MED LIST DOCD IN RCRD: CPT | Mod: CPTII,S$GLB,, | Performed by: NURSE PRACTITIONER

## 2022-06-08 PROCEDURE — 3008F PR BODY MASS INDEX (BMI) DOCUMENTED: ICD-10-PCS | Mod: CPTII,S$GLB,, | Performed by: NURSE PRACTITIONER

## 2022-06-08 PROCEDURE — 3078F DIAST BP <80 MM HG: CPT | Mod: CPTII,S$GLB,, | Performed by: NURSE PRACTITIONER

## 2022-06-08 PROCEDURE — 1159F MED LIST DOCD IN RCRD: CPT | Mod: CPTII,S$GLB,, | Performed by: INTERNAL MEDICINE

## 2022-06-08 PROCEDURE — 69210 PR REMOVAL IMPACTED CERUMEN REQUIRING INSTRUMENTATION, UNILATERAL: ICD-10-PCS | Mod: S$GLB,,, | Performed by: NURSE PRACTITIONER

## 2022-06-08 PROCEDURE — 3008F PR BODY MASS INDEX (BMI) DOCUMENTED: ICD-10-PCS | Mod: CPTII,S$GLB,, | Performed by: INTERNAL MEDICINE

## 2022-06-08 PROCEDURE — 3008F BODY MASS INDEX DOCD: CPT | Mod: CPTII,S$GLB,, | Performed by: INTERNAL MEDICINE

## 2022-06-08 PROCEDURE — 3074F PR MOST RECENT SYSTOLIC BLOOD PRESSURE < 130 MM HG: ICD-10-PCS | Mod: CPTII,S$GLB,, | Performed by: INTERNAL MEDICINE

## 2022-06-08 PROCEDURE — 99202 OFFICE O/P NEW SF 15 MIN: CPT | Mod: S$GLB,,, | Performed by: INTERNAL MEDICINE

## 2022-06-08 PROCEDURE — 86200 CCP ANTIBODY: CPT | Performed by: INTERNAL MEDICINE

## 2022-06-08 PROCEDURE — 1160F RVW MEDS BY RX/DR IN RCRD: CPT | Mod: CPTII,S$GLB,, | Performed by: NURSE PRACTITIONER

## 2022-06-08 PROCEDURE — 99999 PR PBB SHADOW E&M-EST. PATIENT-LVL III: CPT | Mod: PBBFAC,,, | Performed by: NURSE PRACTITIONER

## 2022-06-08 PROCEDURE — 99203 PR OFFICE/OUTPT VISIT, NEW, LEVL III, 30-44 MIN: ICD-10-PCS | Mod: 25,S$GLB,, | Performed by: NURSE PRACTITIONER

## 2022-06-08 NOTE — PROGRESS NOTES
[facility] RHEUMATOLOGY           New patient visit    Notes dictated to M*Modal. Please forgive any unintentional errors.  Subjective:       Patient ID:   NAME: Vee Navarro : 1965     56 y.o. female    Referring Doc: No ref. provider found  Other Physicians:    Chief Complaint:  Initial Evaluation and Abnormal labs      HPI:  This patient was referred for the evaluation of a positive rheumatoid factor.  The blood test was run as part of a workup for joint pain and stiffness.  The patient has not experienced any red, hot, and/or swollen joints.  She does have stiffness but this is more suggestive of gelling than morning stiffness.  She has gotten a kidney transplant which is functioning well.  She is on Imuran for her anti rejection therapy.  She has not tried over-the-counter medications which of course should be avoided due to her renal status.    ROS:   GEN:      no fever, night sweats or weight loss  SKIN:     no rashes, bruising, no Raynauds, no photosensitivity  HEENT:  no acute changes in vision, no mouth ulcers, no sicca symptoms, no scalp tenderness, jaw claudication.  CV:        no CP, PND, LINARES or orthopnea, no palpitations  PULM:   no SOB, cough, hemoptysis, sputum or pleuritic pain  GI:          No dysphagia, GERD, hematemesis; no abdominal pain, nausea, vomiting, constipation, diarrhea, melanotic stools, BRBPR.  :        no hematuria, dysuria  NEURO: no paresthesias, headaches, no tremors  MUSCULOSKELETAL:  As above  PSYCH: No insomnia, no anxiety, no depression    Past Medical/Surgical History:  Past Medical History:   Diagnosis Date    Acid reflux     Acute medial meniscus tear of right knee 2022    Arthritis     Dialysis patient     resolved w/ transplant    Encounter for blood transfusion     End stage kidney disease     Eyak (hard of hearing)     Hypertension     Lumbar radiculopathy     Peritonitis     Short-term memory loss     Vitamin B12 deficiency      Past Surgical  History:   Procedure Laterality Date    arm surgery Left     BACK SURGERY  1982    CARPAL TUNNEL RELEASE Left 8/3/2020    Procedure: RELEASE, CARPAL TUNNEL;  Surgeon: MIKO Fishman MD;  Location: [facility];  Service: Orthopedics;  Laterality: Left;    COLONOSCOPY N/A 12/12/2017    Procedure: COLONOSCOPY;  Surgeon: Elpidio Casillas Jr., MD;  Location: [facility] ENDO;  Service: Endoscopy;  Laterality: N/A;    COLONOSCOPY N/A 1/25/2021    Procedure: COLONOSCOPY;  Surgeon: Elpidio Casillas Jr., MD;  Location: [facility] ENDO;  Service: Endoscopy;  Laterality: N/A;    CORONARY ANGIOGRAPHY N/A 7/14/2021    Procedure: ANGIOGRAM, CORONARY ARTERY;  Surgeon: Corey Aguilar MD;  Location: [facility] CATH;  Service: Cardiovascular;  Laterality: N/A;    DIALYSIS FISTULA CREATION      FOOT SURGERY  03/2019    FRACTURE SURGERY      KIDNEY STONE SURGERY  1983    KIDNEY TRANSPLANT Right 10/2021    LAPAROSCOPIC REVISION OF PROCEDURE INVOLVING PERITONEAL DIALYSIS CATHETER N/A 9/17/2018    Procedure: REVISION OF PROCEDURE INVOLVING PERITONEAL DIALYSIS CATHETER, LAPAROSCOPIC;  Surgeon: Kannan Piedra MD;  Location: [facility];  Service: General;  Laterality: N/A;    LEFT HEART CATHETERIZATION Left 7/14/2021    Procedure: Left heart cath;  Surgeon: Corey Aguilar MD;  Location: [facility] CATH;  Service: Cardiovascular;  Laterality: Left;    NEUROMA SURGERY Left     knee    OPEN REDUCTION AND INTERNAL FIXATION (ORIF) OF FRACTURE OF DISTAL RADIUS Left 8/3/2020    Procedure: ORIF, FRACTURE,/ BONE GRAFT/ ORIF Scaphoid;  Surgeon: MIKO Fishman MD;  Location: [facility];  Service: Orthopedics;  Laterality: Left;  Dr. Fishman completed his surgery at 1159    OTHER SURGICAL HISTORY      peritoneal catheter move X2    PERITONEAL CATHETER REMOVAL N/A 11/23/2019    Procedure: REMOVAL, CATHETER, DIALYSIS, PERITONEAL;  Surgeon: Edilson Sherman MD;  Location: [facility];  Service: General;  Laterality: N/A;    PLACEMENT PERITONEAL DIALYSIS  CATHETER  2011       Allergies:  Review of patient's allergies indicates:   Allergen Reactions    Morphine      Other reaction(s): rash, vomiting       Social/Family History:  Social History     Socioeconomic History    Marital status: Single   Tobacco Use    Smoking status: Former Smoker     Packs/day: 0.25     Years: 35.00     Pack years: 8.75     Types: Cigarettes     Quit date: 2021     Years since quittin.4    Smokeless tobacco: Never Used    Tobacco comment: quit a couple of times previously   Substance and Sexual Activity    Alcohol use: No    Drug use: No    Sexual activity: Never     Family History   Problem Relation Age of Onset    Mental illness Brother     Heart disease Maternal Grandmother     Cancer Maternal Grandfather     Stroke Paternal Grandmother     Cancer Paternal Grandfather     No Known Problems Brother     Arthritis Mother     Hypertension Father     Suicide Father      FAMILY HISTORY: negative for Connective Tissue Disease      Medications:    Current Outpatient Medications:     amLODIPine (NORVASC) 5 MG tablet, 5 mg. 4/15/22 Not taking; bp has been low, Disp: , Rfl:     azaTHIOprine (IMURAN) 50 mg Tab, Take 50 mg by mouth once daily., Disp: , Rfl:     buprenorphine-naloxone 8-2 mg (SUBOXONE) 8-2 mg, 1.5-2 each daily as needed., Disp: , Rfl:     cinacalcet (SENSIPAR) 30 MG Tab, 30 mg daily with breakfast., Disp: , Rfl:     cyanocobalamin 1,000 mcg/mL injection, Inject 1 mL (1,000 mcg total) into the skin once daily for 7 days, THEN 1 mL (1,000 mcg total) once a week for 28 days, THEN 1 mL (1,000 mcg total) every 30 days. Dispense with syringes and needles., Disp: 10 mL, Rfl: 3    fludrocortisone (FLORINEF) 0.1 mg Tab, 0.2 mg once daily., Disp: , Rfl:     multivit with minerals/lutein (MULTIVITAMIN 50 PLUS ORAL), ONCE DAILY, Disp: , Rfl:     mycophenolate (MYFORTIC) 360 MG TbEC, Take 360 mg by mouth. Every Monday and Thursday, Disp: , Rfl:     ondansetron  "(ZOFRAN-ODT) 4 MG TbDL, DISSOLVE ONE TABLET BY MOUTH EVERY 8 HOURS AS NEEDED FOR NAUSEA, Disp: , Rfl:     pantoprazole (PROTONIX) 40 MG tablet, Take 40 mg by mouth 2 (two) times daily., Disp: , Rfl:     polyethylene glycol (GLYCOLAX) 17 gram/dose powder, MIX 1 CAPFUL (17 GRAMS) IN LIQUID AND DRINK ONCE DAILY AS DIRECTED FOR CONSTIPATION, Disp: , Rfl:     pregabalin (LYRICA) 100 MG capsule, Take 100 mg by mouth every evening., Disp: , Rfl:     sulfamethoxazole-trimethoprim 800-160mg (BACTRIM DS) 800-160 mg Tab, Take 1 tablet by mouth on Monday, Wednesday, and Friday of every week to prevent PCP infection., Disp: , Rfl:     syringe with needle 3 mL 23 gauge x 1 1/2" Syrg, 1 each by Misc.(Non-Drug; Combo Route) route once daily at 6am. Use to administer B-12 injections., Disp: 30 each, Rfl: 11    tacrolimus (PROGRAF) 1 MG Cap, 7 capsules daily (3 am; 4 pm), Disp: , Rfl:     valGANciclovir (VALCYTE) 450 mg Tab, 450 mg once daily., Disp: , Rfl:     varenicline (CHANTIX) 1 mg Tab, Take 1 tablet (1 mg total) by mouth 2 (two) times daily. (Patient taking differently: Take 1 mg by mouth 2 (two) times daily. HAS TO BE APOTEX : DO NOT GIVE PAR MANUFACTURED MEDICATION), Disp: 60 tablet, Rfl: 2    Current Facility-Administered Medications:     sodium chloride 0.9% flush 10 mL, 10 mL, Intravenous, PRN, Faizan Jarrell MD    Facility-Administered Medications Ordered in Other Visits:     0.9%  NaCl infusion, 20 mL/hr, Intravenous, Continuous, Yeimy Hickman MD, Last Rate: 20 mL/hr at 09/17/18 0900, 20 mL/hr at 09/17/18 0900    0.9%  NaCl infusion, , Intravenous, Continuous, Albin Javier MD, Stopped at 08/03/20 1242    lidocaine (PF) 10 mg/ml (1%) injection 2 mg, 0.2 mL, Intradermal, Once, Albin Javier MD    Objective:     Vitals:  Blood pressure (!) 98/58, weight 53.5 kg (118 lb).    Physical Examination:   GEN:     wn/wd female in no apparent distress  SKIN:    no rashes, no sclerodactyly, " no Raynaud's, no periungual erythema, no digital tip tapering, no nailbed pitting  HEAD:   no alopecia, no scalp tenderness, no temporal artery tenderness or induration.  EYES:   no conjunctival pallor, no icterus  ENT:     no thrush, no mucosal dryness or ulcerations, adequate oral hygiene & dentition.  NECK:  supple x 6, no masses, no thyromegaly, no lymphadenopathy.  CV:        S1 and S2, RRR, no murmurs, gallop or rubs  CHEST: Normal respiratory effort;  normal breath sounds/no adventitious sounds. No signs of consolidation.  ABD:      non-tender and non-distended; soft; normal bowel sounds; no rebound, guarding, or tenderness. No hepatosplenomegaly.  Musculoskeletal:  Examination of the small joints reveal some tenderness on flexion of the left wrist without evidence of inflammation, synovial proliferation, or deformity.  The right wrist is unremarkable.  The MCP joints reveal no evidence of inflammatory disease or deformity.  Small Heberden's and Navdeep's nodes are present bilaterally.  The large joint exam is consistent with degenerative changes appropriate for age.  Posture, ROM, and gait are unremarkable  EXTREM: no clubbing, cyanosis or edema. normal pulses. Hubert's - x2  NEURO:  grossly intact; motor/sensory WNL; no tremors  PSYCH:  normal mood, affect and behavior    Labs:   Lab Results   Component Value Date    WBC 5.53 07/14/2021    HGB 10.9 (L) 04/15/2022    HCT 34.7 (L) 07/14/2021    MCV 90 07/14/2021     07/14/2021   CMP@  Sodium   Date Value Ref Range Status   05/26/2022 136 136 - 145 mmol/L Final     Potassium   Date Value Ref Range Status   05/26/2022 4.9 3.5 - 5.1 mmol/L Final     Chloride   Date Value Ref Range Status   05/26/2022 108 95 - 110 mmol/L Final     CO2   Date Value Ref Range Status   05/26/2022 25 22 - 31 mmol/L Final     Glucose   Date Value Ref Range Status   05/26/2022 115 (H) 70 - 110 mg/dL Final     Comment:     The ADA recommends the following guidelines for fasting  glucose:    Normal:       less than 100 mg/dL    Prediabetes:  100 mg/dL to 125 mg/dL    Diabetes:     126 mg/dL or higher       BUN   Date Value Ref Range Status   05/26/2022 14 7 - 18 mg/dL Final     Creatinine   Date Value Ref Range Status   05/26/2022 0.86 0.50 - 1.40 mg/dL Final     Calcium   Date Value Ref Range Status   05/26/2022 9.8 8.4 - 10.2 mg/dL Final     Total Protein   Date Value Ref Range Status   05/26/2022 6.1 6.0 - 8.4 g/dL Final     Albumin   Date Value Ref Range Status   05/26/2022 3.6 3.5 - 5.2 g/dL Final     Total Bilirubin   Date Value Ref Range Status   05/26/2022 0.5 0.2 - 1.3 mg/dL Final     Alkaline Phosphatase   Date Value Ref Range Status   05/26/2022 162 (H) 38 - 145 U/L Final     AST   Date Value Ref Range Status   05/26/2022 23 14 - 36 U/L Final     ALT   Date Value Ref Range Status   05/26/2022 9 0 - 35 U/L Final     CRP   Date Value Ref Range Status   02/24/2022 0.60 0.00 - 0.90 mg/dL Final     Rheumatoid Factor   Date Value Ref Range Status   02/24/2022 30.5 (H) 0.0 - 15.0 IU/mL Final     Comment:     Warning:  Pathologically high levels of IgG (>2500 mg/dL) may   interfere with quantification of RF.         Radiology/Diagnostic Studies:    None    Assessment/Discussion/Plan:   56 y.o. female with generalized joint pain with a low positive RF, without other historical, examination findings, or laboratory data to suggest an inflammatory arthropathy    PLAN:  I have explained to her that this is almost certainly osteoarthritis.  The pain in the left wrist she informs me was from a fracture she sustained years ago that was not set properly.  I have recommended that she take acetaminophen up to 1 g 3 times daily.  I reminded her to avoid other over-the-counter medications.  I have ordered a CCP to better characterize the RF.  I have provided her with literature on the RF test and on osteoarthritis.    RTC:  I will see her back if her blood testing suggests RA or any other inflammatory  arthropathy              Electronically signed by Jake Burks MD

## 2022-06-08 NOTE — PROGRESS NOTES
Subjective:       Patient ID: Vee Navarro is a 56 y.o. female.    Chief Complaint: Cerumen Impaction, Otalgia, Dizziness, and left ear pain    HPI   Patient is new to ENT, self-referred for a variety of ENT concerns. Patient reports chronic imbalance; reportedly told it was antibiotic related. Patient reports recurrent cerumen impactions. Patient reports intermittent left otalgia at the superior aspect of her left auditory meatus, only when she presses on it with a fingertip or Qtip. Does not bother her otherwise. Comes and goes. Was present when she made this appt, but not bothering her at this time.     Review of Systems   Constitutional: Negative.    HENT: Positive for ear pain.    Eyes: Negative.    Respiratory: Negative.    Cardiovascular: Negative.    Gastrointestinal: Negative.    Musculoskeletal: Negative.    Integumentary:  Negative.   Neurological: Negative.    Hematological: Negative.    Psychiatric/Behavioral: Negative.          Objective:      Physical Exam  Vitals and nursing note reviewed.   Constitutional:       General: She is not in acute distress.     Appearance: She is well-developed. She is not ill-appearing or diaphoretic.   HENT:      Head: Normocephalic and atraumatic.      Right Ear: Hearing, tympanic membrane, ear canal and external ear normal. No middle ear effusion. Tympanic membrane is not erythematous.      Left Ear: Hearing, tympanic membrane, ear canal and external ear normal.  No middle ear effusion. Tympanic membrane is not erythematous.      Nose: Nose normal.   Eyes:      General: Lids are normal. No scleral icterus.        Right eye: No discharge.         Left eye: No discharge.   Neck:      Trachea: Trachea normal. No tracheal deviation.   Cardiovascular:      Rate and Rhythm: Normal rate.   Pulmonary:      Effort: Pulmonary effort is normal. No respiratory distress.      Breath sounds: No stridor. No wheezing.   Musculoskeletal:         General: Normal range of motion.       Cervical back: Normal range of motion and neck supple.   Skin:     General: Skin is warm and dry.      Coloration: Skin is not pale.   Neurological:      Mental Status: She is alert and oriented to person, place, and time.      Coordination: Coordination normal.      Gait: Gait normal.   Psychiatric:         Speech: Speech normal.         Behavior: Behavior normal. Behavior is cooperative.         Thought Content: Thought content normal.         Judgment: Judgment normal.       SEPARATE PROCEDURE IN OFFICE:   Procedure: Removal of impacted cerumen, bilateral   Pre Procedure Diagnosis: Cerumen Impaction   Post Procedure Diagnosis: Cerumen Impaction   Verbal informed consent in regards to risk of trauma to ear canal, ear drum or hearing, discomfort during procedure and/or inability to remove cerumen impaction in one session or unforeseen events or complications.   No anesthesia.     Procedure in detail:   Ear canal visualized bilateral with appropriate size ear speculum utilizing Operating Head Binocular Otomicroscope   Utilizing the following:  Ring curet, delicate alligator forceps, and/or suction cannula was used. The impacted cerumen of the ear canals was removed atraumatically. The TM and EAC were then inspected and found to be clear of wax. See description of TMs/EACs in PE above.   Complications: No   Condition: Improved/Good     Assessment:       Problem List Items Addressed This Visit    None     Visit Diagnoses     Bilateral impacted cerumen    -  Primary    Referred otalgia of left ear              Plan:     Reassured negative aural exam. Explained intermittent localized tenderness to touch could represent a small pimple or possibly musculoskeletal etiology (TMJ arthralgia). I would have to see it when it is actively hurting to determine.     Coconut oil to ears prn  Patient encouraged to return to clinic if symptoms worsen/persist and as needed for further ENT symptoms or concerns.          agitation/delerium

## 2022-06-11 LAB — CCP IGA+IGG SERPL IA-ACNC: 4 UNITS (ref 0–19)

## 2022-07-13 ENCOUNTER — IMMUNIZATION (OUTPATIENT)
Dept: FAMILY MEDICINE | Facility: CLINIC | Age: 57
End: 2022-07-13
Payer: MEDICARE

## 2022-07-13 DIAGNOSIS — Z23 NEED FOR VACCINATION: Primary | ICD-10-CM

## 2022-07-13 PROCEDURE — 0054A COVID-19, MRNA, LNP-S, PF, 30 MCG/0.3 ML DOSE VACCINE (PFIZER): CPT | Mod: PBBFAC | Performed by: FAMILY MEDICINE

## 2022-07-26 PROBLEM — S83.241A ACUTE MEDIAL MENISCUS TEAR OF RIGHT KNEE: Status: ACTIVE | Noted: 2022-07-26

## 2022-10-10 ENCOUNTER — OFFICE VISIT (OUTPATIENT)
Dept: URGENT CARE | Facility: CLINIC | Age: 57
End: 2022-10-10
Payer: MEDICARE

## 2022-10-10 VITALS
BODY MASS INDEX: 18.68 KG/M2 | OXYGEN SATURATION: 99 % | SYSTOLIC BLOOD PRESSURE: 107 MMHG | WEIGHT: 119 LBS | RESPIRATION RATE: 16 BRPM | TEMPERATURE: 98 F | HEART RATE: 68 BPM | DIASTOLIC BLOOD PRESSURE: 61 MMHG | HEIGHT: 67 IN

## 2022-10-10 DIAGNOSIS — T14.90XA INJURY: ICD-10-CM

## 2022-10-10 DIAGNOSIS — S52.502A CLOSED FRACTURE OF DISTAL END OF LEFT RADIUS, UNSPECIFIED FRACTURE MORPHOLOGY, INITIAL ENCOUNTER: Primary | ICD-10-CM

## 2022-10-10 DIAGNOSIS — S62.002A CLOSED NONDISPLACED FRACTURE OF SCAPHOID OF LEFT WRIST, UNSPECIFIED PORTION OF SCAPHOID, INITIAL ENCOUNTER: ICD-10-CM

## 2022-10-10 PROCEDURE — 1160F RVW MEDS BY RX/DR IN RCRD: CPT | Mod: CPTII,S$GLB,, | Performed by: NURSE PRACTITIONER

## 2022-10-10 PROCEDURE — 99213 OFFICE O/P EST LOW 20 MIN: CPT | Mod: S$GLB,,, | Performed by: NURSE PRACTITIONER

## 2022-10-10 PROCEDURE — 3074F PR MOST RECENT SYSTOLIC BLOOD PRESSURE < 130 MM HG: ICD-10-PCS | Mod: CPTII,S$GLB,, | Performed by: NURSE PRACTITIONER

## 2022-10-10 PROCEDURE — 3078F PR MOST RECENT DIASTOLIC BLOOD PRESSURE < 80 MM HG: ICD-10-PCS | Mod: CPTII,S$GLB,, | Performed by: NURSE PRACTITIONER

## 2022-10-10 PROCEDURE — 1159F PR MEDICATION LIST DOCUMENTED IN MEDICAL RECORD: ICD-10-PCS | Mod: CPTII,S$GLB,, | Performed by: NURSE PRACTITIONER

## 2022-10-10 PROCEDURE — 73110 XR WRIST COMPLETE 3 VIEWS LEFT: ICD-10-PCS | Mod: LT,S$GLB,, | Performed by: RADIOLOGY

## 2022-10-10 PROCEDURE — 3008F PR BODY MASS INDEX (BMI) DOCUMENTED: ICD-10-PCS | Mod: CPTII,S$GLB,, | Performed by: NURSE PRACTITIONER

## 2022-10-10 PROCEDURE — 1160F PR REVIEW ALL MEDS BY PRESCRIBER/CLIN PHARMACIST DOCUMENTED: ICD-10-PCS | Mod: CPTII,S$GLB,, | Performed by: NURSE PRACTITIONER

## 2022-10-10 PROCEDURE — 99213 PR OFFICE/OUTPT VISIT, EST, LEVL III, 20-29 MIN: ICD-10-PCS | Mod: S$GLB,,, | Performed by: NURSE PRACTITIONER

## 2022-10-10 PROCEDURE — 3074F SYST BP LT 130 MM HG: CPT | Mod: CPTII,S$GLB,, | Performed by: NURSE PRACTITIONER

## 2022-10-10 PROCEDURE — 73110 X-RAY EXAM OF WRIST: CPT | Mod: LT,S$GLB,, | Performed by: RADIOLOGY

## 2022-10-10 PROCEDURE — 3078F DIAST BP <80 MM HG: CPT | Mod: CPTII,S$GLB,, | Performed by: NURSE PRACTITIONER

## 2022-10-10 PROCEDURE — 3008F BODY MASS INDEX DOCD: CPT | Mod: CPTII,S$GLB,, | Performed by: NURSE PRACTITIONER

## 2022-10-10 PROCEDURE — 1159F MED LIST DOCD IN RCRD: CPT | Mod: CPTII,S$GLB,, | Performed by: NURSE PRACTITIONER

## 2022-10-10 NOTE — PATIENT INSTRUCTIONS
Follow up with Orthopedics as referred.  Wear splint until seen by Ortho.    INSTRUCTIONS:  - Rest.  - Drink plenty of fluids.  - Take Tylenol and/or Ibuprofen as directed as needed for fever/pain.  Do not take more than the recommended dose.  - follow up with your PCP within the next 1-2 weeks as needed.  - You must understand that you have received an Urgent Care treatment only and that you may be released before all of your medical problems are known or treated.   - You, the patient, will arrange for follow up care as instructed.   - If your condition worsens or fails to improve we recommend that you receive another evaluation at the ER immediately or contact your PCP to discuss your concerns.   - You can call [phone] or [phone] to help schedule an appointment with the appropriate provider.     -If you smoke cigarettes, it would be beneficial for you to stop.         Pt will be I with LB dressing within 2 weeks. Pt will be I with toileting within 2 weeks.

## 2022-10-10 NOTE — PROGRESS NOTES
"Subjective:       Patient ID: Vee Navarro is a 57 y.o. female.    Vitals:  height is 5' 7" (1.702 m) and weight is 54 kg (119 lb). Her oral temperature is 97.9 °F (36.6 °C). Her blood pressure is 107/61 and her pulse is 68. Her respiration is 16 and oxygen saturation is 99%.     Chief Complaint: Fall    Pt presents today with L wrist injury from a fall. X's yesterday. Pt has taken OTC meds with no relief.    Provider note begins below:  57-year-old female here today with complaints of left wrist pain/injury that occurred yesterday.  Patient reports that she slipped while she was washing her van and landed on her left palm/wrist.  Patient reports that she broke the same wrist 5 years ago and had surgery.  Patient denies any other injuries.  Denies head injury, neck pain or LOC.  Patient is currently wearing a Velcro wrist splint.  Ice pack given in Urgent Care.    Fall  The accident occurred 12 to 24 hours ago. The fall occurred while standing. She fell from a height of 3 to 5 ft. She landed on [place]. There was no blood loss. The point of impact was the left wrist. The pain is present in the left wrist. The pain is at a severity of 9/10. The symptoms are aggravated by pressure on injury, rotation, use of injured limb, movement, flexion, extension and ambulation. Pertinent negatives include no abdominal pain, bowel incontinence, fever, headaches, hearing loss, hematuria, loss of consciousness, nausea, numbness, tingling, visual change or vomiting. She has tried acetaminophen and immobilization for the symptoms. The treatment provided mild relief.     Constitution: Negative. Negative for chills, sweating, fatigue and fever.   HENT: Negative.  Negative for ear pain, facial swelling, congestion and sore throat.    Neck: Negative for painful lymph nodes.   Cardiovascular: Negative.  Negative for chest trauma, chest pain and sob on exertion.   Eyes: Negative.  Negative for eye itching and eye pain.   Respiratory: " Negative.  Negative for chest tightness, cough and asthma.    Gastrointestinal: Negative.  Negative for abdominal pain, nausea, vomiting, diarrhea and bowel incontinence.   Endocrine: negative. cold intolerance and excessive thirst.   Genitourinary: Negative.  Negative for dysuria, frequency, urgency and hematuria.   Musculoskeletal:  Positive for pain, trauma and joint pain.   Skin: Negative.  Negative for rash, wound and hives.   Allergic/Immunologic: Negative.  Negative for eczema, asthma, hives and itching.   Neurological: Negative.  Negative for headaches, disorientation, altered mental status, loss of consciousness and numbness.   Hematologic/Lymphatic: Negative.  Negative for swollen lymph nodes.   Psychiatric/Behavioral: Negative.  Negative for altered mental status, disorientation and confusion.      Objective:      Physical Exam   Constitutional: She is oriented to person, place, and time. She appears well-developed. She is cooperative.  Non-toxic appearance. She does not appear ill. No distress.   HENT:   Head: Normocephalic and atraumatic. Head is without abrasion, without contusion and without laceration.   Ears:   Right Ear: Hearing, tympanic membrane, external ear and ear canal normal. No hemotympanum.   Left Ear: Hearing, tympanic membrane, external ear and ear canal normal. No hemotympanum.   Nose: Nose normal. No mucosal edema, rhinorrhea or nasal deformity. No epistaxis. Right sinus exhibits no maxillary sinus tenderness and no frontal sinus tenderness. Left sinus exhibits no maxillary sinus tenderness and no frontal sinus tenderness.   Mouth/Throat: Uvula is midline, oropharynx is clear and moist and mucous membranes are normal. No trismus in the jaw. Normal dentition. No uvula swelling. No posterior oropharyngeal erythema.   Eyes: Conjunctivae, EOM and lids are normal. Pupils are equal, round, and reactive to light. Right eye exhibits no discharge. Left eye exhibits no discharge. No scleral  icterus.   Neck: Trachea normal and phonation normal. Neck supple. No tracheal deviation present. No neck rigidity present. No spinous process tenderness present. No muscular tenderness present.   Cardiovascular: Normal rate, regular rhythm, normal heart sounds and normal pulses.   Pulmonary/Chest: Effort normal and breath sounds normal. No respiratory distress.   Abdominal: Normal appearance and bowel sounds are normal. She exhibits no distension and no mass. Soft. There is no abdominal tenderness.   Musculoskeletal: Normal range of motion.         General: No deformity. Normal range of motion.      Left hand: She exhibits normal capillary refill. Motor /Testing: Wrist Extension: 0/5. Wrist Flexion: 0/5. : 0/5. Index Finger: 5/5. Middle Finger: 5/5. Ring Finger: 5/5. Little Finger: 5/5. Thumb APB: 5/5. Thumb FPL: 5/5. Pinch Mechanism: 5/5. Comments: Limited ROM. Mild swelling to area. 3 + left radial pulse intact.  Sensations intact. cap refill less than 2 seconds.        Hands:       Comments: Difficulty with pronation and supination of left hand.   Neurological: She is alert and oriented to person, place, and time. She has normal strength. No cranial nerve deficit or sensory deficit. She exhibits normal muscle tone. She displays no seizure activity. Coordination normal. GCS eye subscore is 4. GCS verbal subscore is 5. GCS motor subscore is 6.   Skin: Skin is warm, dry, intact, not diaphoretic and not pale. Capillary refill takes less than 2 seconds. No abrasion, No burn, No bruising and No ecchymosis   Psychiatric: Her speech is normal and behavior is normal. Judgment and thought content normal.   Nursing note and vitals reviewed.         IMAGING-  XR WRIST COMPLETE 3 VIEWS LEFT    Result Date: 10/10/2022  EXAMINATION: XR WRIST COMPLETE 3 VIEWS LEFT CLINICAL HISTORY: Injury, unspecified, initial encounter TECHNIQUE: PA, lateral, and oblique views of the left wrist were performed. COMPARISON: None FINDINGS:  There is a fracture of the left distal radial metaphysis, without significant displacement.  The distal ulna is intact.  Moderate surrounding soft tissue swelling is present.  There is some distortion of the midcarpal joint.  A transversely oriented lucent line through the presumed scaphoid bone is present, for which a fracture is not excluded.     Left distal radius fracture without significant displacement. Possible scaphoid fracture.  Consider CT for further evaluation. Electronically signed by: Quinton Joshi MD Date:    10/10/2022 Time:    12:43        Assessment:       1. Closed fracture of distal end of left radius, unspecified fracture morphology, initial encounter    2. Injury    3. Closed nondisplaced fracture of scaphoid of left wrist, unspecified portion of scaphoid, initial encounter          Plan:       FOLLOWUP  Follow up if symptoms worsen or fail to improve, for PLEASE CONTACT PCP OR CONTACT THE EMERGENCY ROOM..     PATIENT INSTRUCTIONS  Patient Instructions   Follow up with Orthopedics as referred.  Wear splint until seen by Ortho.    INSTRUCTIONS:  - Rest.  - Drink plenty of fluids.  - Take Tylenol and/or Ibuprofen as directed as needed for fever/pain.  Do not take more than the recommended dose.  - follow up with your PCP within the next 1-2 weeks as needed.  - You must understand that you have received an Urgent Care treatment only and that you may be released before all of your medical problems are known or treated.   - You, the patient, will arrange for follow up care as instructed.   - If your condition worsens or fails to improve we recommend that you receive another evaluation at the ER immediately or contact your PCP to discuss your concerns.   - You can call [phone] or [phone] to help schedule an appointment with the appropriate provider.     -If you smoke cigarettes, it would be beneficial for you to stop.         THANK YOU FOR ALLOWING ME TO PARTICIPATE IN YOUR  [facility],     Kannan Teran NP     Closed fracture of distal end of left radius, unspecified fracture morphology, initial encounter  -     Ambulatory referral/consult to Orthopedics    Injury  -     XR WRIST COMPLETE 3 VIEWS LEFT; Future; Expected date: 10/10/2022    Closed nondisplaced fracture of scaphoid of left wrist, unspecified portion of scaphoid, initial encounter

## 2022-10-11 ENCOUNTER — HOSPITAL ENCOUNTER (OUTPATIENT)
Dept: RADIOLOGY | Facility: HOSPITAL | Age: 57
Discharge: HOME | End: 2022-10-11
Attending: PHYSICIAN ASSISTANT
Payer: MEDICARE

## 2022-10-11 ENCOUNTER — OFFICE VISIT (OUTPATIENT)
Dept: ORTHOPEDICS | Facility: CLINIC | Age: 57
End: 2022-10-11
Payer: MEDICARE

## 2022-10-11 DIAGNOSIS — S52.552A OTHER CLOSED EXTRA-ARTICULAR FRACTURE OF DISTAL END OF LEFT RADIUS, INITIAL ENCOUNTER: ICD-10-CM

## 2022-10-11 DIAGNOSIS — S62.022K CLOSED DISPLACED FRACTURE OF MIDDLE THIRD OF SCAPHOID OF LEFT WRIST WITH NONUNION, SUBSEQUENT ENCOUNTER: ICD-10-CM

## 2022-10-11 DIAGNOSIS — S52.552A OTHER CLOSED EXTRA-ARTICULAR FRACTURE OF DISTAL END OF LEFT RADIUS, INITIAL ENCOUNTER: Primary | ICD-10-CM

## 2022-10-11 PROCEDURE — 99204 OFFICE O/P NEW MOD 45 MIN: CPT | Mod: 25,S$GLB,, | Performed by: PHYSICIAN ASSISTANT

## 2022-10-11 PROCEDURE — 73110 X-RAY EXAM OF WRIST: CPT | Mod: 26,LT,, | Performed by: RADIOLOGY

## 2022-10-11 PROCEDURE — 1160F RVW MEDS BY RX/DR IN RCRD: CPT | Mod: CPTII,S$GLB,, | Performed by: PHYSICIAN ASSISTANT

## 2022-10-11 PROCEDURE — 1159F PR MEDICATION LIST DOCUMENTED IN MEDICAL RECORD: ICD-10-PCS | Mod: CPTII,S$GLB,, | Performed by: PHYSICIAN ASSISTANT

## 2022-10-11 PROCEDURE — 29075 PR APPLY FOREARM CAST: ICD-10-PCS | Mod: LT,S$GLB,, | Performed by: PHYSICIAN ASSISTANT

## 2022-10-11 PROCEDURE — 73110 XR WRIST COMPLETE 3 VIEWS LEFT: ICD-10-PCS | Mod: 26,LT,, | Performed by: RADIOLOGY

## 2022-10-11 PROCEDURE — 99999 PR PBB SHADOW E&M-EST. PATIENT-LVL III: CPT | Mod: PBBFAC,,, | Performed by: PHYSICIAN ASSISTANT

## 2022-10-11 PROCEDURE — 73090 XR FOREARM LEFT: ICD-10-PCS | Mod: 26,LT,, | Performed by: RADIOLOGY

## 2022-10-11 PROCEDURE — 1159F MED LIST DOCD IN RCRD: CPT | Mod: CPTII,S$GLB,, | Performed by: PHYSICIAN ASSISTANT

## 2022-10-11 PROCEDURE — 99204 PR OFFICE/OUTPT VISIT, NEW, LEVL IV, 45-59 MIN: ICD-10-PCS | Mod: 25,S$GLB,, | Performed by: PHYSICIAN ASSISTANT

## 2022-10-11 PROCEDURE — 73090 X-RAY EXAM OF FOREARM: CPT | Mod: 26,LT,, | Performed by: RADIOLOGY

## 2022-10-11 PROCEDURE — 1160F PR REVIEW ALL MEDS BY PRESCRIBER/CLIN PHARMACIST DOCUMENTED: ICD-10-PCS | Mod: CPTII,S$GLB,, | Performed by: PHYSICIAN ASSISTANT

## 2022-10-11 PROCEDURE — 73110 X-RAY EXAM OF WRIST: CPT | Mod: TC,PO,LT

## 2022-10-11 PROCEDURE — 73090 X-RAY EXAM OF FOREARM: CPT | Mod: TC,PO,LT

## 2022-10-11 PROCEDURE — 99999 PR PBB SHADOW E&M-EST. PATIENT-LVL III: ICD-10-PCS | Mod: PBBFAC,,, | Performed by: PHYSICIAN ASSISTANT

## 2022-10-11 PROCEDURE — 29075 APPL CST ELBW FNGR SHORT ARM: CPT | Mod: LT,S$GLB,, | Performed by: PHYSICIAN ASSISTANT

## 2022-10-11 NOTE — PROGRESS NOTES
10/11/2022    Chief Complaint:  Chief Complaint   Patient presents with    Left Wrist - Injury, Pain, Swelling     Fall - 10/09/2022       HPI:  Vee Navarro is a 57 y.o. female, who presents to clinic today for evaluation of her left distal radius fracture.  States approximately 2 days ago she was washing her car when she tripped and fell on outstretched left wrist.  States this prompted her to report to the Urgent Care yesterday where x-rays were taken which showed a fracture of the distal radius.  States she is instructed to follow with our clinic.  States pain can reach up to 10/10.  States pain is better with rest.  States pain is worse with activity.  Denies any paresthesias.  Denies any other complaints this time.        PMHX:  Past Medical History:   Diagnosis Date    Acid reflux     Acute medial meniscus tear of right knee 04/2022    Arthritis     Dialysis patient     resolved w/ transplant    Encounter for blood transfusion     End stage kidney disease     [place] (hard of hearing)     Hypertension     Lumbar radiculopathy     Peritonitis     Short-term memory loss     Thyroid disease     Vitamin B12 deficiency        PSHX:  Past Surgical History:   Procedure Laterality Date    arm surgery Left     ARTHROSCOPY OF KNEE Right 7/26/2022    Procedure: ARTHROSCOPY, KNEE;  Surgeon: Tam Rosario MD;  Location: [facility];  Service: Orthopedics;  Laterality: Right;    BACK SURGERY  1982    CARPAL TUNNEL RELEASE Left 8/3/2020    Procedure: RELEASE, CARPAL TUNNEL;  Surgeon: MIKO Fishman MD;  Location: [facility];  Service: Orthopedics;  Laterality: Left;    COLONOSCOPY N/A 12/12/2017    Procedure: COLONOSCOPY;  Surgeon: Elpidio Casillas Jr., MD;  Location: [facility];  Service: Endoscopy;  Laterality: N/A;    COLONOSCOPY N/A 1/25/2021    Procedure: COLONOSCOPY;  Surgeon: Elpidio Casillas Jr., MD;  Location: [facility];  Service: Endoscopy;  Laterality: N/A;    CORONARY ANGIOGRAPHY N/A 7/14/2021    Procedure:  ANGIOGRAM, CORONARY ARTERY;  Surgeon: Corey Aguilar MD;  Location: [facility] CATH;  Service: Cardiovascular;  Laterality: N/A;    DIALYSIS FISTULA CREATION      EXCISION OF MEDIAL MENISCUS OF KNEE Right 7/26/2022    Procedure: MENISCECTOMY, KNEE, MEDIAL;  Surgeon: Tam Rosario MD;  Location: [facility];  Service: Orthopedics;  Laterality: Right;  partial     FOOT SURGERY  03/2019    FRACTURE SURGERY      KIDNEY STONE SURGERY  1983    KIDNEY TRANSPLANT Right 10/2021    KNEE ARTHROSCOPY Right 07/26/2022    right knee scope    LAPAROSCOPIC REVISION OF PROCEDURE INVOLVING PERITONEAL DIALYSIS CATHETER N/A 9/17/2018    Procedure: REVISION OF PROCEDURE INVOLVING PERITONEAL DIALYSIS CATHETER, LAPAROSCOPIC;  Surgeon: Kannan Piedra MD;  Location: [facility];  Service: General;  Laterality: N/A;    LEFT HEART CATHETERIZATION Left 7/14/2021    Procedure: Left heart cath;  Surgeon: Corey Aguilar MD;  Location: [facility] CATH;  Service: Cardiovascular;  Laterality: Left;    NEUROMA SURGERY Left     knee    OPEN REDUCTION AND INTERNAL FIXATION (ORIF) OF FRACTURE OF DISTAL RADIUS Left 8/3/2020    Procedure: ORIF, FRACTURE,/ BONE GRAFT/ ORIF Scaphoid;  Surgeon: MIKO Fishman MD;  Location: [facility];  Service: Orthopedics;  Laterality: Left;  Dr. Fishman completed his surgery at 1159    OTHER SURGICAL HISTORY      peritoneal catheter move X2    PERITONEAL CATHETER REMOVAL N/A 11/23/2019    Procedure: REMOVAL, CATHETER, DIALYSIS, PERITONEAL;  Surgeon: Edilson Sherman MD;  Location: [facility];  Service: General;  Laterality: N/A;    PLACEMENT PERITONEAL DIALYSIS CATHETER  2011       FMHX:  Family History   Problem Relation Age of Onset    Mental illness Brother     Heart disease Maternal Grandmother     Cancer Maternal Grandfather     Stroke Paternal Grandmother     Cancer Paternal Grandfather     No Known Problems Brother     Arthritis Mother     Hypertension Father     Suicide Father        SOCHX:  Social History     Tobacco Use  "   Smoking status: Former     Packs/day: 0.25     Years: 35.00     Pack years: 8.75     Types: Cigarettes     Quit date: 2021     Years since quittin.7    Smokeless tobacco: Never    Tobacco comments:     quit a couple of times previously   Substance Use Topics    Alcohol use: No       ALLERGIES:  Morphine    CURRENT MEDICATIONS:  Current Outpatient Medications on File Prior to Visit   Medication Sig Dispense Refill    azaTHIOprine (IMURAN) 50 mg Tab Take 50 mg by mouth once daily.      buprenorphine-naloxone 8-2 mg (SUBOXONE) 8-2 mg 1.5-2 each daily as needed.      cinacalcet (SENSIPAR) 30 MG Tab 30 mg daily with breakfast.      cyanocobalamin 1,000 mcg/mL injection INJECT 1 ML ONCE DAILY FOR 7 DAYS, THEN 1 ML ONCE A WEEK FOR A MONTH, THEN ONCE MONTHLY 10 mL 3    HYDROcodone-acetaminophen (NORCO)  mg per tablet Take 1 tablet by mouth every 4 (four) hours as needed (pain). 25 tablet 0    multivit with minerals/lutein (MULTIVITAMIN 50 PLUS ORAL) ONCE DAILY      mycophenolate (MYFORTIC) 360 MG TbEC Take 360 mg by mouth. Every Monday and Thursday      ondansetron (ZOFRAN-ODT) 4 MG TbDL DISSOLVE ONE TABLET BY MOUTH EVERY 8 HOURS AS NEEDED FOR NAUSEA      pantoprazole (PROTONIX) 40 MG tablet Take 40 mg by mouth 2 (two) times daily.      PHOSPHA 250 NEUTRAL 250 mg Tab Take 1 tablet by mouth 3 (three) times daily.      pregabalin (LYRICA) 100 MG capsule Take 100 mg by mouth every evening.      sulfamethoxazole-trimethoprim 800-160mg (BACTRIM DS) 800-160 mg Tab Take 1 tablet by mouth on Monday, Wednesday, and Friday of every week to prevent PCP infection.      syringe with needle 3 mL 23 gauge x 1 1/2" Syrg 1 each by Misc.(Non-Drug; Combo Route) route once daily at 6am. Use to administer B-12 injections. 30 each 11    tacrolimus (PROGRAF) 1 MG Cap 7 capsules daily (3 am; 4 pm)      valGANciclovir (VALCYTE) 450 mg Tab 450 mg once daily.      varenicline (CHANTIX) 1 mg Tab TAKE ONE TABLET BY MOUTH TWICE A DAY 60 " tablet 2     Current Facility-Administered Medications on File Prior to Visit   Medication Dose Route Frequency Provider Last Rate Last Admin    0.9%  NaCl infusion  20 mL/hr Intravenous Continuous Yeimy Hickman MD 20 mL/hr at 09/17/18 0900 20 mL/hr at 09/17/18 0900    0.9%  NaCl infusion   Intravenous Continuous Albin Javier MD   Stopped at 08/03/20 1242    lidocaine (PF) 10 mg/ml (1%) injection 2 mg  0.2 mL Intradermal Once Albin Javier MD        sodium chloride 0.9% flush 10 mL  10 mL Intravenous PRN Faizan Jarrell MD           REVIEW OF SYSTEMS:  Review of Systems   Constitutional: Negative.    HENT: Negative.     Eyes: Negative.    Respiratory: Negative.     Cardiovascular: Negative.    Gastrointestinal: Negative.    Genitourinary: Negative.    Musculoskeletal:  Positive for falls and joint pain.   Skin: Negative.    Neurological: Negative.    Endo/Heme/Allergies: Negative.    Psychiatric/Behavioral: Negative.       GENERAL PHYSICAL EXAM:   LMP  (LMP Unknown)    GEN: well developed, well nourished, no acute distress   HENT: Normocephalic, atraumatic   EYES: No discharge, conjunctiva normal   NECK: Supple, non-tender   PULM: No wheezing, no respiratory distress   CV: RRR   ABD: Soft, non-tender    ORTHO EXAM:   Examination of the left wrist reveals moderate edema and ecchymosis.  No erythema or skin breakdown noted.  Arthritic deformity noted of the 1st CMC joint of the left thumb.  Able to flex extend the fingers appropriately.  Wrist range of motion not tested secondary to injury.  Strength not tested secondary to injury.  Sensation is grossly intact in the radial, ulnar, median nerve distributions.  Capillary refill less than 2 seconds in all fingers.    RADIOLOGY:   X-rays of the left wrist were taken today in clinic.  X-rays reviewed by myself.  Imaging showed the presence of a fracture of the distal radius of the left wrist.  No significant change in position or alignment  compared to previous imaging.  Presence of a chronic/remote scaphoid nonunion fracture with associated deformity of the scaphoid.  Presence of significant cystic changes throughout the lunate with questionable nondisplaced fracture.  Presence of severe radial subluxation of the base of the 1st metacarpal with associated disruption/dislocation of the 1st CMC joint.  No radiopaque foreign body or mass noted.  No other significant bony abnormalities noted.   X-rays left forearm were taken today in clinic.  X-rays were reviewed by myself.  Imaging showed the presence of no fracture of the shafts of the radius or ulna.  Presence of a distal radius fracture as seen on the x-rays of the left wrist.  No other significant abnormalities noted of the left forearm x-rays.    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 I discussed with Vee Navarro distal radius fracture pathology and treatment options in detail during today's visit.  The treatment options were discussed, we decided best course of action this time is to proceed with immobilization via a short-arm cast using fiberglass of the left wrist.  Discussed importance of keeping the cast dry, clean, and intact until seen again in clinic.  We discussed importance of having limited to no weight-bearing of the left hand/arm until seen again in clinic.  She verbally agreed with the treatment plan.    2. She was placed in a short-arm cast using fiberglass of the left wrist in clinic today.      3. I would like her follow up in clinic in 2 weeks for repeat evaluation at which point we will perform repeat x-rays of the left wrist out of the cast.  She was instructed to contact the clinic for any problems or concerns in the interim.

## 2022-10-17 ENCOUNTER — TELEPHONE (OUTPATIENT)
Dept: OTOLARYNGOLOGY | Facility: CLINIC | Age: 57
End: 2022-10-17
Payer: MEDICARE

## 2022-10-21 DIAGNOSIS — S52.552A OTHER CLOSED EXTRA-ARTICULAR FRACTURE OF DISTAL END OF LEFT RADIUS, INITIAL ENCOUNTER: Primary | ICD-10-CM

## 2022-10-25 DIAGNOSIS — H91.90 HEARING LOSS, UNSPECIFIED HEARING LOSS TYPE, UNSPECIFIED LATERALITY: Primary | ICD-10-CM

## 2022-10-26 ENCOUNTER — HOSPITAL ENCOUNTER (OUTPATIENT)
Dept: RADIOLOGY | Facility: HOSPITAL | Age: 57
Discharge: HOME | End: 2022-10-26
Attending: PHYSICIAN ASSISTANT
Payer: MEDICARE

## 2022-10-26 ENCOUNTER — OFFICE VISIT (OUTPATIENT)
Dept: ORTHOPEDICS | Facility: CLINIC | Age: 57
End: 2022-10-26
Payer: MEDICARE

## 2022-10-26 DIAGNOSIS — S52.552A OTHER CLOSED EXTRA-ARTICULAR FRACTURE OF DISTAL END OF LEFT RADIUS, INITIAL ENCOUNTER: ICD-10-CM

## 2022-10-26 DIAGNOSIS — S52.552D OTHER CLOSED EXTRA-ARTICULAR FRACTURE OF DISTAL END OF LEFT RADIUS WITH ROUTINE HEALING, SUBSEQUENT ENCOUNTER: Primary | ICD-10-CM

## 2022-10-26 PROCEDURE — 1159F PR MEDICATION LIST DOCUMENTED IN MEDICAL RECORD: ICD-10-PCS | Mod: CPTII,S$GLB,, | Performed by: PHYSICIAN ASSISTANT

## 2022-10-26 PROCEDURE — 73110 XR WRIST COMPLETE 3 VIEWS LEFT: ICD-10-PCS | Mod: 26,LT,, | Performed by: RADIOLOGY

## 2022-10-26 PROCEDURE — 99999 PR PBB SHADOW E&M-EST. PATIENT-LVL III: CPT | Mod: PBBFAC,,, | Performed by: PHYSICIAN ASSISTANT

## 2022-10-26 PROCEDURE — 99213 OFFICE O/P EST LOW 20 MIN: CPT | Mod: S$GLB,,, | Performed by: PHYSICIAN ASSISTANT

## 2022-10-26 PROCEDURE — 73110 X-RAY EXAM OF WRIST: CPT | Mod: TC,PO,LT

## 2022-10-26 PROCEDURE — 99999 PR PBB SHADOW E&M-EST. PATIENT-LVL III: ICD-10-PCS | Mod: PBBFAC,,, | Performed by: PHYSICIAN ASSISTANT

## 2022-10-26 PROCEDURE — 99213 PR OFFICE/OUTPT VISIT, EST, LEVL III, 20-29 MIN: ICD-10-PCS | Mod: S$GLB,,, | Performed by: PHYSICIAN ASSISTANT

## 2022-10-26 PROCEDURE — 1159F MED LIST DOCD IN RCRD: CPT | Mod: CPTII,S$GLB,, | Performed by: PHYSICIAN ASSISTANT

## 2022-10-26 PROCEDURE — 1160F RVW MEDS BY RX/DR IN RCRD: CPT | Mod: CPTII,S$GLB,, | Performed by: PHYSICIAN ASSISTANT

## 2022-10-26 PROCEDURE — 73110 X-RAY EXAM OF WRIST: CPT | Mod: 26,LT,, | Performed by: RADIOLOGY

## 2022-10-26 PROCEDURE — 1160F PR REVIEW ALL MEDS BY PRESCRIBER/CLIN PHARMACIST DOCUMENTED: ICD-10-PCS | Mod: CPTII,S$GLB,, | Performed by: PHYSICIAN ASSISTANT

## 2022-10-26 NOTE — PROGRESS NOTES
10/26/2022    HPI:  Vee Navarro is a 57 y.o. female, who presents to clinic today for follow-up for her left distal radius fracture.  She is approximately 2 weeks status post injury.  States her pain has significantly improved since last visit.  States pain is currently 0/10.  States he has kept her cast dry, clean, and intact since her last visit.  States he has had limited weight-bearing as instructed since her last visit.  Denies any acute injuries since last visit.  Denies any paresthesias.  Denies any other complaints at this time.    PMHX:  Past Medical History:   Diagnosis Date    Acid reflux     Acute medial meniscus tear of right knee 04/2022    Arthritis     Dialysis patient     resolved w/ transplant    Encounter for blood transfusion     End stage kidney disease     [place] (hard of hearing)     Hypertension     Lumbar radiculopathy     Peritonitis     Short-term memory loss     Thyroid disease     Vitamin B12 deficiency        PSHX:  Past Surgical History:   Procedure Laterality Date    arm surgery Left     ARTHROSCOPY OF KNEE Right 7/26/2022    Procedure: ARTHROSCOPY, KNEE;  Surgeon: Tam Rosario MD;  Location: [facility];  Service: Orthopedics;  Laterality: Right;    BACK SURGERY  1982    CARPAL TUNNEL RELEASE Left 8/3/2020    Procedure: RELEASE, CARPAL TUNNEL;  Surgeon: MIKO Fishman MD;  Location: [facility];  Service: Orthopedics;  Laterality: Left;    COLONOSCOPY N/A 12/12/2017    Procedure: COLONOSCOPY;  Surgeon: Elpidio Casillas Jr., MD;  Location: [facility] ENDO;  Service: Endoscopy;  Laterality: N/A;    COLONOSCOPY N/A 1/25/2021    Procedure: COLONOSCOPY;  Surgeon: Elpidio Casillas Jr., MD;  Location: [facility] ENDO;  Service: Endoscopy;  Laterality: N/A;    CORONARY ANGIOGRAPHY N/A 7/14/2021    Procedure: ANGIOGRAM, CORONARY ARTERY;  Surgeon: Corey Aguilar MD;  Location: [facility] CATH;  Service: Cardiovascular;  Laterality: N/A;    DIALYSIS FISTULA CREATION      EXCISION OF MEDIAL MENISCUS OF KNEE Right  2022    Procedure: MENISCECTOMY, KNEE, MEDIAL;  Surgeon: Tam Rosario MD;  Location: [facility];  Service: Orthopedics;  Laterality: Right;  partial     FOOT SURGERY  2019    FRACTURE SURGERY      KIDNEY STONE SURGERY  1983    KIDNEY TRANSPLANT Right 10/2021    KNEE ARTHROSCOPY Right 2022    right knee scope    LAPAROSCOPIC REVISION OF PROCEDURE INVOLVING PERITONEAL DIALYSIS CATHETER N/A 2018    Procedure: REVISION OF PROCEDURE INVOLVING PERITONEAL DIALYSIS CATHETER, LAPAROSCOPIC;  Surgeon: Kannan Piedra MD;  Location: [facility];  Service: General;  Laterality: N/A;    LEFT HEART CATHETERIZATION Left 2021    Procedure: Left heart cath;  Surgeon: Corey Aguilar MD;  Location: [facility] CATH;  Service: Cardiovascular;  Laterality: Left;    NEUROMA SURGERY Left     knee    OPEN REDUCTION AND INTERNAL FIXATION (ORIF) OF FRACTURE OF DISTAL RADIUS Left 8/3/2020    Procedure: ORIF, FRACTURE,/ BONE GRAFT/ ORIF Scaphoid;  Surgeon: MIKO Fishman MD;  Location: [facility];  Service: Orthopedics;  Laterality: Left;  Dr. Fishman completed his surgery at 1159    OTHER SURGICAL HISTORY      peritoneal catheter move X2    PERITONEAL CATHETER REMOVAL N/A 2019    Procedure: REMOVAL, CATHETER, DIALYSIS, PERITONEAL;  Surgeon: Edilson Sherman MD;  Location: [facility];  Service: General;  Laterality: N/A;    PLACEMENT PERITONEAL DIALYSIS CATHETER         FMHX:  Family History   Problem Relation Age of Onset    Mental illness Brother     Heart disease Maternal Grandmother     Cancer Maternal Grandfather     Stroke Paternal Grandmother     Cancer Paternal Grandfather     No Known Problems Brother     Arthritis Mother     Hypertension Father     Suicide Father        SOCHX:  Social History     Tobacco Use    Smoking status: Former     Packs/day: 0.25     Years: 35.00     Pack years: 8.75     Types: Cigarettes     Quit date: 2021     Years since quittin.8    Smokeless tobacco: Never    Tobacco  "comments:     quit a couple of times previously   Substance Use Topics    Alcohol use: No       ALLERGIES:  Morphine    CURRENT MEDICATIONS:  Current Outpatient Medications on File Prior to Visit   Medication Sig Dispense Refill    azaTHIOprine (IMURAN) 50 mg Tab Take 50 mg by mouth once daily.      buprenorphine-naloxone 8-2 mg (SUBOXONE) 8-2 mg 1.5-2 each daily as needed.      cinacalcet (SENSIPAR) 30 MG Tab 30 mg daily with breakfast.      cyanocobalamin 1,000 mcg/mL injection INJECT 1 ML ONCE DAILY FOR 7 DAYS, THEN 1 ML ONCE A WEEK FOR A MONTH, THEN ONCE MONTHLY 10 mL 3    HYDROcodone-acetaminophen (NORCO)  mg per tablet Take 1 tablet by mouth every 4 (four) hours as needed (pain). 25 tablet 0    multivit with minerals/lutein (MULTIVITAMIN 50 PLUS ORAL) ONCE DAILY      mycophenolate (MYFORTIC) 360 MG TbEC Take 360 mg by mouth. Every Monday and Thursday      ondansetron (ZOFRAN-ODT) 4 MG TbDL DISSOLVE ONE TABLET BY MOUTH EVERY 8 HOURS AS NEEDED FOR NAUSEA      pantoprazole (PROTONIX) 40 MG tablet Take 40 mg by mouth 2 (two) times daily.      PHOSPHA 250 NEUTRAL 250 mg Tab Take 1 tablet by mouth 3 (three) times daily.      pregabalin (LYRICA) 100 MG capsule Take 100 mg by mouth every evening.      sulfamethoxazole-trimethoprim 800-160mg (BACTRIM DS) 800-160 mg Tab Take 1 tablet by mouth on Monday, Wednesday, and Friday of every week to prevent PCP infection.      syringe with needle 3 mL 23 gauge x 1 1/2" Syrg 1 each by Misc.(Non-Drug; Combo Route) route once daily at 6am. Use to administer B-12 injections. 30 each 11    tacrolimus (PROGRAF) 1 MG Cap 7 capsules daily (3 am; 4 pm)      valGANciclovir (VALCYTE) 450 mg Tab 450 mg once daily.      varenicline (CHANTIX) 1 mg Tab TAKE ONE TABLET BY MOUTH TWICE A DAY 60 tablet 2     Current Facility-Administered Medications on File Prior to Visit   Medication Dose Route Frequency Provider Last Rate Last Admin    0.9%  NaCl infusion  20 mL/hr Intravenous Continuous " Yeimy Hickman MD 20 mL/hr at 09/17/18 0900 20 mL/hr at 09/17/18 0900    0.9%  NaCl infusion   Intravenous Continuous Albin Javier MD   Stopped at 08/03/20 1242    lidocaine (PF) 10 mg/ml (1%) injection 2 mg  0.2 mL Intradermal Once Albin Javier MD        sodium chloride 0.9% flush 10 mL  10 mL Intravenous PRN Faizan Jarrell MD           REVIEW OF SYSTEMS:  Review of Systems Complete; Negative, unless noted above.    GENERAL PHYSICAL EXAM:   LMP  (LMP Unknown)    GEN: well developed, well nourished, no acute distress   PULM: No wheezing, no respiratory distress   CV: RRR    ORTHO EXAM:   Examination of the left wrist reveals minimal edema.  No erythema, ecchymosis, or skin breakdown noted.  Able to flex extend the fingers appropriately.  Wrist range of motion not tested secondary to fracture.  Strength not tested secondary to fracture.  Normal sensation in the radial, ulnar, median nerve distributions.  Capillary refill less than 2 seconds in all fingers.    RADIOLOGY:   X-rays of the left wrist were taken today in clinic.  X-rays reviewed by myself.  Imaging showed the presence of a fracture of the distal radius of the left wrist with routine healing.  No significant change in position or alignment compared to previous imaging.  Progressive interval healing compared to previous imaging.  No other significant bony abnormalities noted.    ASSESSMENT:   Distal radius fracture of the left wrist with routine healing    PLAN:  1. I discussed with Vee HUGO Navarro that she is progressing appropriately in the treatment course.  We discussed the best course of action this time is continue with immobilization via a short-arm cast using fiberglass of the left wrist.  We discussed importance of continued limited weight-bearing of the left arm/hand to seen again in clinic.  We discussed the importance of keeping the cast dry, clean, and intact to seen again in clinic.  She verbally agreed with the  treatment plan.    2. She is placed in a short-arm cast using fiberglass of the left wrist in clinic today.      3. I would like her follow up in clinic in 2 weeks at which time we will perform repeat x-rays of the left wrist out of the cast.  She was instructed to contact the clinic for any problems or concerns in the interim.

## 2022-10-27 ENCOUNTER — TELEPHONE (OUTPATIENT)
Dept: ORTHOPEDICS | Facility: CLINIC | Age: 57
End: 2022-10-27
Payer: MEDICARE

## 2022-10-27 NOTE — TELEPHONE ENCOUNTER
Called and left message for patient to call back regarding her concern about her cast.  Sated to call back.

## 2022-10-28 ENCOUNTER — TELEPHONE (OUTPATIENT)
Dept: ORTHOPEDICS | Facility: CLINIC | Age: 57
End: 2022-10-28
Payer: MEDICARE

## 2022-11-04 DIAGNOSIS — S52.552D OTHER CLOSED EXTRA-ARTICULAR FRACTURE OF DISTAL END OF LEFT RADIUS WITH ROUTINE HEALING, SUBSEQUENT ENCOUNTER: Primary | ICD-10-CM

## 2022-11-11 ENCOUNTER — OFFICE VISIT (OUTPATIENT)
Dept: ORTHOPEDICS | Facility: CLINIC | Age: 57
End: 2022-11-11
Payer: MEDICARE

## 2022-11-11 ENCOUNTER — HOSPITAL ENCOUNTER (OUTPATIENT)
Dept: RADIOLOGY | Facility: HOSPITAL | Age: 57
Discharge: HOME | End: 2022-11-11
Attending: PHYSICIAN ASSISTANT
Payer: MEDICARE

## 2022-11-11 DIAGNOSIS — S52.552D OTHER CLOSED EXTRA-ARTICULAR FRACTURE OF DISTAL END OF LEFT RADIUS WITH ROUTINE HEALING, SUBSEQUENT ENCOUNTER: Primary | ICD-10-CM

## 2022-11-11 DIAGNOSIS — M19.132 SNAC (SCAPHOID NON-UNION ADVANCED COLLAPSE) OF WRIST, LEFT: ICD-10-CM

## 2022-11-11 DIAGNOSIS — S62.002S SNAC (SCAPHOID NON-UNION ADVANCED COLLAPSE) OF WRIST, LEFT: ICD-10-CM

## 2022-11-11 DIAGNOSIS — S52.552D OTHER CLOSED EXTRA-ARTICULAR FRACTURE OF DISTAL END OF LEFT RADIUS WITH ROUTINE HEALING, SUBSEQUENT ENCOUNTER: ICD-10-CM

## 2022-11-11 PROCEDURE — 73110 X-RAY EXAM OF WRIST: CPT | Mod: 26,LT,, | Performed by: RADIOLOGY

## 2022-11-11 PROCEDURE — 99213 PR OFFICE/OUTPT VISIT, EST, LEVL III, 20-29 MIN: ICD-10-PCS | Mod: S$GLB,,, | Performed by: PHYSICIAN ASSISTANT

## 2022-11-11 PROCEDURE — 73110 XR WRIST COMPLETE 3 VIEWS LEFT: ICD-10-PCS | Mod: 26,LT,, | Performed by: RADIOLOGY

## 2022-11-11 PROCEDURE — 99213 OFFICE O/P EST LOW 20 MIN: CPT | Mod: S$GLB,,, | Performed by: PHYSICIAN ASSISTANT

## 2022-11-11 PROCEDURE — 1160F PR REVIEW ALL MEDS BY PRESCRIBER/CLIN PHARMACIST DOCUMENTED: ICD-10-PCS | Mod: CPTII,S$GLB,, | Performed by: PHYSICIAN ASSISTANT

## 2022-11-11 PROCEDURE — 1160F RVW MEDS BY RX/DR IN RCRD: CPT | Mod: CPTII,S$GLB,, | Performed by: PHYSICIAN ASSISTANT

## 2022-11-11 PROCEDURE — 73110 X-RAY EXAM OF WRIST: CPT | Mod: TC,PO,LT

## 2022-11-11 PROCEDURE — 1159F PR MEDICATION LIST DOCUMENTED IN MEDICAL RECORD: ICD-10-PCS | Mod: CPTII,S$GLB,, | Performed by: PHYSICIAN ASSISTANT

## 2022-11-11 PROCEDURE — 99999 PR PBB SHADOW E&M-EST. PATIENT-LVL III: CPT | Mod: PBBFAC,,, | Performed by: PHYSICIAN ASSISTANT

## 2022-11-11 PROCEDURE — 1159F MED LIST DOCD IN RCRD: CPT | Mod: CPTII,S$GLB,, | Performed by: PHYSICIAN ASSISTANT

## 2022-11-11 PROCEDURE — 99999 PR PBB SHADOW E&M-EST. PATIENT-LVL III: ICD-10-PCS | Mod: PBBFAC,,, | Performed by: PHYSICIAN ASSISTANT

## 2022-11-16 ENCOUNTER — OFFICE VISIT (OUTPATIENT)
Dept: NEUROLOGY | Facility: CLINIC | Age: 57
End: 2022-11-16
Payer: MEDICARE

## 2022-11-16 VITALS
DIASTOLIC BLOOD PRESSURE: 74 MMHG | BODY MASS INDEX: 19.28 KG/M2 | HEART RATE: 68 BPM | WEIGHT: 122.81 LBS | SYSTOLIC BLOOD PRESSURE: 160 MMHG | HEIGHT: 67 IN

## 2022-11-16 DIAGNOSIS — R41.3 SHORT-TERM MEMORY LOSS: ICD-10-CM

## 2022-11-16 DIAGNOSIS — R29.6 FREQUENT FALLS: ICD-10-CM

## 2022-11-16 DIAGNOSIS — R26.89 IMBALANCE: Primary | ICD-10-CM

## 2022-11-16 PROCEDURE — 3078F DIAST BP <80 MM HG: CPT | Mod: CPTII,S$GLB,, | Performed by: NURSE PRACTITIONER

## 2022-11-16 PROCEDURE — 3008F BODY MASS INDEX DOCD: CPT | Mod: CPTII,S$GLB,, | Performed by: NURSE PRACTITIONER

## 2022-11-16 PROCEDURE — 3008F PR BODY MASS INDEX (BMI) DOCUMENTED: ICD-10-PCS | Mod: CPTII,S$GLB,, | Performed by: NURSE PRACTITIONER

## 2022-11-16 PROCEDURE — 1160F RVW MEDS BY RX/DR IN RCRD: CPT | Mod: CPTII,S$GLB,, | Performed by: NURSE PRACTITIONER

## 2022-11-16 PROCEDURE — 99214 OFFICE O/P EST MOD 30 MIN: CPT | Mod: S$GLB,,, | Performed by: NURSE PRACTITIONER

## 2022-11-16 PROCEDURE — 99999 PR PBB SHADOW E&M-EST. PATIENT-LVL V: ICD-10-PCS | Mod: PBBFAC,,, | Performed by: NURSE PRACTITIONER

## 2022-11-16 PROCEDURE — 99214 PR OFFICE/OUTPT VISIT, EST, LEVL IV, 30-39 MIN: ICD-10-PCS | Mod: S$GLB,,, | Performed by: NURSE PRACTITIONER

## 2022-11-16 PROCEDURE — 3077F SYST BP >= 140 MM HG: CPT | Mod: CPTII,S$GLB,, | Performed by: NURSE PRACTITIONER

## 2022-11-16 PROCEDURE — 1159F PR MEDICATION LIST DOCUMENTED IN MEDICAL RECORD: ICD-10-PCS | Mod: CPTII,S$GLB,, | Performed by: NURSE PRACTITIONER

## 2022-11-16 PROCEDURE — 99999 PR PBB SHADOW E&M-EST. PATIENT-LVL V: CPT | Mod: PBBFAC,,, | Performed by: NURSE PRACTITIONER

## 2022-11-16 PROCEDURE — 1159F MED LIST DOCD IN RCRD: CPT | Mod: CPTII,S$GLB,, | Performed by: NURSE PRACTITIONER

## 2022-11-16 PROCEDURE — 3078F PR MOST RECENT DIASTOLIC BLOOD PRESSURE < 80 MM HG: ICD-10-PCS | Mod: CPTII,S$GLB,, | Performed by: NURSE PRACTITIONER

## 2022-11-16 PROCEDURE — 3077F PR MOST RECENT SYSTOLIC BLOOD PRESSURE >= 140 MM HG: ICD-10-PCS | Mod: CPTII,S$GLB,, | Performed by: NURSE PRACTITIONER

## 2022-11-16 PROCEDURE — 1160F PR REVIEW ALL MEDS BY PRESCRIBER/CLIN PHARMACIST DOCUMENTED: ICD-10-PCS | Mod: CPTII,S$GLB,, | Performed by: NURSE PRACTITIONER

## 2022-11-16 NOTE — PROGRESS NOTES
NEUROLOGY  Outpatient Follow Up    [facility]  [address] [facility], [place] [address]  [phone] (office) / [phone] (fax)    Patient Name:  Vee Navarro  :  1965  MR #:  [id number]  Acct #:  [id number]    Date of Neurology Visit: 2022  Name of Provider: CIRILO Hinds    Other Physicians:  Evelina Gunn MD (Primary Care Physician); No ref. provider found (Referring)      Chief Complaint: Gait Problem      History of Present Illness (HPI):  Vee Navarro is a right handed 56 y.o. female with a PMHX of ESRD, HTN (controlled), Kidney transplant in Oct 2021.   Patient is here today for memory loss. She is solo at today's visit. She is concerned about short term memory. She prides on not ever being a forgetful person. She often misplaces items or forgets things that are told to her. She lives at home with her mother and 2 young boys.      Patient's highest level of education completed was clinton in college. 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 She is working part time. The onset of memory issues began about 1 year ago. She struggles more short term memory loss. She believes that she is more impatient now as she lives with her 86 y/o mother and 2 teenage boys. She endorses mild depression but doesn't believe she needs medications. She denies suicidal ideation. She denies hallucinations. She states her sleep is fair and now has to sleep on her side which is a change for her. She will often wake up in the night but is able to fall back asleep fine. She believes she does well with executive function. She is managing her finances and medications well and without issues. She denies issues with hygiene and able to perform ADLs without assistance. She reports word finding difficulty but denies comprehension issues. She is hard of hearing and has seen an audiologist.   She denies issues with object recognition. She  denies urinary incontinence. Her last fall was bout 5-6 months. She is still driving and denies recent MVAs. She does report getting lost near her neighborhood stating she couldn't remember where she lived. She reports this immediately after dialysis and not feeling well that day.   She works part time for her business and helps with house work.              Interval Hx 11/16/2022:  Patient is here today for balance issues. She reports having had a problem with balance for many years. She has had frequent falls with the last one being 2 months ago. That fall involved tripping over uneven ground. She fractured 2 bones in her wrist. She does suffer osteoporosis and now being treated for it.  She is concerned about losing her balance when turning too fast or leaning over. She denies dizziness, spinning or associated nausea, LOC, vomiting, visual loss, focal weakness or diaphoresis. All recent neuro testing discussed with the patient.             Past Medical, Surgical, Family & Social History:   Reviewed and updated.    Home Medications:     Current Outpatient Medications:     alendronate (FOSAMAX) 70 MG tablet, Take 1 tablet (70 mg total) by mouth every 7 days., Disp: 4 tablet, Rfl: 5    azaTHIOprine (IMURAN) 50 mg Tab, Take 50 mg by mouth once daily., Disp: , Rfl:     buprenorphine-naloxone 8-2 mg (SUBOXONE) 8-2 mg, 1.5-2 each daily as needed., Disp: , Rfl:     cinacalcet (SENSIPAR) 30 MG Tab, 60 mg daily with breakfast., Disp: , Rfl:     cyanocobalamin 1,000 mcg/mL injection, INJECT 1 ML ONCE DAILY FOR 7 DAYS, THEN 1 ML ONCE A WEEK FOR A MONTH, THEN ONCE MONTHLY, Disp: 10 mL, Rfl: 3    multivit with minerals/lutein (MULTIVITAMIN 50 PLUS ORAL), ONCE DAILY, Disp: , Rfl:     mycophenolate (MYFORTIC) 360 MG TbEC, Take 360 mg by mouth. Every Monday and Thursday, Disp: , Rfl:     ondansetron (ZOFRAN-ODT) 4 MG TbDL, DISSOLVE ONE TABLET BY MOUTH EVERY 8 HOURS AS NEEDED FOR NAUSEA, Disp: , Rfl:     pantoprazole (PROTONIX) 40  "MG tablet, Take 40 mg by mouth 2 (two) times daily., Disp: , Rfl:     PHOSPHA 250 NEUTRAL 250 mg Tab, Take 1 tablet by mouth 3 (three) times daily., Disp: , Rfl:     pregabalin (LYRICA) 100 MG capsule, Take 100 mg by mouth every evening., Disp: , Rfl:     sulfamethoxazole-trimethoprim 800-160mg (BACTRIM DS) 800-160 mg Tab, Take 1 tablet by mouth on Monday, Wednesday, and Friday of every week to prevent PCP infection., Disp: , Rfl:     syringe with needle 3 mL 23 gauge x 1 1/2" Syrg, 1 each by Misc.(Non-Drug; Combo Route) route once daily at 6am. Use to administer B-12 injections., Disp: 30 each, Rfl: 11    tacrolimus (PROGRAF) 1 MG Cap, 7 capsules daily (3 am; 4 pm), Disp: , Rfl:     valGANciclovir (VALCYTE) 450 mg Tab, 450 mg once daily., Disp: , Rfl:     varenicline (CHANTIX) 1 mg Tab, TAKE ONE TABLET BY MOUTH TWICE A DAY, Disp: 60 tablet, Rfl: 2    HYDROcodone-acetaminophen (NORCO)  mg per tablet, Take 1 tablet by mouth every 4 (four) hours as needed (pain). (Patient not taking: Reported on 11/7/2022), Disp: 25 tablet, Rfl: 0    Current Facility-Administered Medications:     sodium chloride 0.9% flush 10 mL, 10 mL, Intravenous, PRN, Faizan Jarrell MD    Facility-Administered Medications Ordered in Other Visits:     0.9%  NaCl infusion, 20 mL/hr, Intravenous, Continuous, Yeimy Hickman MD, Last Rate: 20 mL/hr at 09/17/18 0900, 20 mL/hr at 09/17/18 0900    0.9%  NaCl infusion, , Intravenous, Continuous, Albin Javier MD, Stopped at 08/03/20 1242    lidocaine (PF) 10 mg/ml (1%) injection 2 mg, 0.2 mL, Intradermal, Once, Albin Javier MD    Physical Examination:  BP (!) 160/74 (BP Location: Right arm, Patient Position: Standing, BP Method: Medium (Automatic))   Pulse 68   Ht 5' 7" (1.702 m)   Wt 55.7 kg (122 lb 12.7 oz)   LMP  (LMP Unknown)   BMI 19.23 kg/m²     GENERAL:  General appearance: Well, non-toxic appearing.  No apparent distress.  Neck: supple.  .     MENTAL " STATUS:  Alertness, attention span & concentration: normal.  Language: normal.  Orientation to self, place & time:  normal.  Memory, recent & remote: normal.  Fund of knowledge: normal.  MMSE:29/30 (4/2022)     SPEECH:  Clear and fluent.  Follows complex commands.     CRANIAL NERVES:  Cranial Nerves II-XII were examined.  II - Visual fields: normal.  III, IV, VI: PERRL, EOMI, No ptosis, No nystagmus.  V - Facial sensation: normal.  VII - Face symmetry & mobility: Due to Covid-19 recommended precautions, patient wearing facial mask; mouth and nose not examined.  VIII - Hearing: normal.  IX, X - Palate: Due to Covid-19 recommended precautions, patient wearing facial mask; mouth and nose not examined.  XI - Shoulder shrug: normal.  XII - Tongue protrusion: Due to Covid-19 recommended precautions, patient wearing facial mask; mouth and nose not examined.     GROSS MOTOR:  Gait & station: mildly antaglic; poor tandem  Tone: normal.  Abnormal movements: none.  Finger-nose: normal.  Rapid alternating movements: normal.  Pronator drift: normal        MUSCLE STRENGTH:      Fascics Atrophy RIGHT      LEFT Atrophy Fascics       5 Biceps 5           5 Triceps 5           5 Forearm.Pr. 5                           4+ Iliopsoas flex    4+           5 Hip Abduct 5           5 Hip Adduct 5           4+ Quads 4+           5 Hams 5           5 Dorsiflex 5           5 Plantar Flex 5                          REFLEXES:     RIGHT Reflex    LEFT   2+ Biceps 2+   2+ Brachiorad. 2+           1+ Patellar 1+      SENSORY:  Light touch: Normal throughout.                  Diagnostic Data Reviewed:   Component      Latest Ref Rng & Units 11/12/2022 4/7/2022   Sodium      136 - 145 mmol/L 139    Potassium      3.5 - 5.1 mmol/L 5.2 (H)    Chloride      95 - 110 mmol/L 106    CO2      22 - 31 mmol/L 24    Glucose      70 - 110 mg/dL 99    BUN      7 - 18 mg/dL 27 (H)    Creatinine      0.50 - 1.40 mg/dL 1.10    Calcium      8.4 - 10.2 mg/dL 10.5  (H)    PROTEIN TOTAL      6.0 - 8.4 g/dL 6.7    Albumin      3.5 - 5.2 g/dL 4.2    BILIRUBIN TOTAL      0.2 - 1.3 mg/dL 0.7    Alkaline Phosphatase      38 - 145 U/L 118    AST      14 - 36 U/L 25    ALT      0 - 35 U/L 11    Anion Gap      8 - 16 mmol/L 9    eGFR      >60 mL/min/1.73 m:2 58 (A)    Vitamin B-12      210 - 950 pg/mL  211   Folate      4.0 - 24.0 ng/mL  6.2   RPR      Non-reactive  Non-reactive   TSH      0.400 - 4.000 uIU/mL  1.392   Vit D, 25-Hydroxy      30 - 96 ng/mL 41        MRI Brain 2016:  Findings: The diffusion-weighted images are negative for acute diffuse ischemia.  There does appear to be some T2 shine through artifact present for example periventricular, high left parietal.  No mass effect, layering hemorrhage or overt hydrocephalus is appreciated.  There is symmetric overall appearance of the sulcal, gyral margins.  There is symmetric overall appearance of the soft tissues and orbital structures.  Unremarkable overall appearance of the vascular flow voids.  The left vertebral artery appears developmentally dominant.  No significant paranasal sinus mucosal thickening or layering fluid levels are appreciated. Mild volume loss and chronic appearing periventricular white matter changes are appreciated. Correlate overall with the patient's clinical findings and laboratory values.  IMPRESSION:    No mass effect, layering hemorrhage or evidence of acute ischemia is appreciated.  No acute intracranial changes are appreciated.  There do appear to be some scattered chronic appearing periventricular white matter changes bilaterally which could be seen with processes including chronic gliosis, microvascular ischemia with the pattern less typical for demyelination appearance.       MRI brain 4/2022:  FINDINGS:  Intracranial contents:There is no acute or significant abnormality and no definite change in the appearance of the brain compared to the prior study.  There is only mild volume loss with very  mild nonspecific periventricular and scattered subcortical white matter FLAIR and T2 hyperintense signal.  These mild white matter changes likely reflect sequelae of chronic small vessel disease.  There is a punctate focus of abnormal signal in the left paramedian findings which could represent a remote punctate lacunar infarction or focus of small vessel disease.  There is no hydrocephalus or midline shift.  There is no hemorrhage.  There are no regions of restricted diffusion to suggest acute infarction.  There is no hydrocephalus or midline shift.  There is no abnormal extra-axial fluid collection.  The basilar cisterns are open.  Flow voids indicating patency are present in the major vessels at the base of the brain.  The cerebellar tonsils are normal position.  Sellar structures are normal.   The orbits are grossly normal.     Extracranial contents, calvarium, soft tissues:There is mild scattered mucosal thickening in the paranasal sinuses.  The mastoid air cells are clear.  Baseline marrow signal is normal.     Impression:     1. There is no acute abnormality.  There is mild volume loss and nonspecific white matter change.  There is no hemorrhage, mass or acute infarction.                 Assessment and Plan:      Problem List Items Addressed This Visit          Neuro    Short-term memory loss    Current Assessment & Plan     last MMSE 29/30  F/u as needed            Other    Imbalance - Primary    Current Assessment & Plan     Patient is a 58 y/o female that presents for imbalance. This has been ongoing for the last 5 years or so. She has had frequent falls as well.  She denies associated spinning, nausea, visual disturbances, LOC, migraines.   Imbalance occurs when turning too fast, climbing high heights or leaning over.   Recent MRI brain scan without central component  Recent MRI L-spine noted with degenerative changes  Suspect imbalance is multifactorial due to osteoporotic changes, spine changes and lab  changes  Neuro exam non focal; gait is antalgic but displayed poor tandem   - do not suspect NPH  Obtain MRI C-spine  Referral placed to outpt PT for balance  Encouraged pt to drink plenty fluids   - recent BUN has been elevated         Frequent falls    Current Assessment & Plan     See plan above                            Important to note, also  has a past medical history of Acid reflux, Acute medial meniscus tear of right knee (04/2022), Arthritis, Dialysis patient, Encounter for blood transfusion, End stage kidney disease, [place] (hard of hearing), Hypertension, Lumbar radiculopathy, Peritonitis, Short-term memory loss, Thyroid disease, and Vitamin B12 deficiency.          The patient will return to clinic as needed for memory     All questions were answered and patient is comfortable with the plan.         Thank you very much for the opportunity to assist in this patient's care.    If you have any questions or concerns, please do not hesitate to contact me at any time.      Sincerely,     CIRILO Hinds  [facility] [place]         I spent a total of 36 minutes on the day of the visit.This includes face to face time and non-face to face time preparing to see the patient (eg, review of tests), Obtaining and/or reviewing separately obtained history, Documenting clinical information in the electronic or other health record, Independently interpreting resultsand communicating results to the patient/family/caregiver, or Care coordination.

## 2022-11-21 DIAGNOSIS — S52.552D OTHER CLOSED EXTRA-ARTICULAR FRACTURE OF DISTAL END OF LEFT RADIUS WITH ROUTINE HEALING, SUBSEQUENT ENCOUNTER: Primary | ICD-10-CM

## 2022-11-28 ENCOUNTER — HOSPITAL ENCOUNTER (OUTPATIENT)
Dept: RADIOLOGY | Facility: HOSPITAL | Age: 57
Discharge: HOME | End: 2022-11-28
Attending: PHYSICIAN ASSISTANT
Payer: MEDICARE

## 2022-11-28 ENCOUNTER — OFFICE VISIT (OUTPATIENT)
Dept: ORTHOPEDICS | Facility: CLINIC | Age: 57
End: 2022-11-28
Payer: MEDICARE

## 2022-11-28 VITALS — BODY MASS INDEX: 19.15 KG/M2 | HEIGHT: 67 IN | WEIGHT: 122 LBS

## 2022-11-28 DIAGNOSIS — M19.132 SNAC (SCAPHOID NON-UNION ADVANCED COLLAPSE) OF WRIST, LEFT: ICD-10-CM

## 2022-11-28 DIAGNOSIS — S62.002S SNAC (SCAPHOID NON-UNION ADVANCED COLLAPSE) OF WRIST, LEFT: ICD-10-CM

## 2022-11-28 DIAGNOSIS — S52.552D OTHER CLOSED EXTRA-ARTICULAR FRACTURE OF DISTAL END OF LEFT RADIUS WITH ROUTINE HEALING, SUBSEQUENT ENCOUNTER: Primary | ICD-10-CM

## 2022-11-28 DIAGNOSIS — S52.552D OTHER CLOSED EXTRA-ARTICULAR FRACTURE OF DISTAL END OF LEFT RADIUS WITH ROUTINE HEALING, SUBSEQUENT ENCOUNTER: ICD-10-CM

## 2022-11-28 PROCEDURE — 3008F PR BODY MASS INDEX (BMI) DOCUMENTED: ICD-10-PCS | Mod: CPTII,S$GLB,, | Performed by: PHYSICIAN ASSISTANT

## 2022-11-28 PROCEDURE — 99999 PR PBB SHADOW E&M-EST. PATIENT-LVL III: CPT | Mod: PBBFAC,,, | Performed by: PHYSICIAN ASSISTANT

## 2022-11-28 PROCEDURE — 73110 X-RAY EXAM OF WRIST: CPT | Mod: TC,PO,LT

## 2022-11-28 PROCEDURE — 1159F PR MEDICATION LIST DOCUMENTED IN MEDICAL RECORD: ICD-10-PCS | Mod: CPTII,S$GLB,, | Performed by: PHYSICIAN ASSISTANT

## 2022-11-28 PROCEDURE — 3008F BODY MASS INDEX DOCD: CPT | Mod: CPTII,S$GLB,, | Performed by: PHYSICIAN ASSISTANT

## 2022-11-28 PROCEDURE — 1160F RVW MEDS BY RX/DR IN RCRD: CPT | Mod: CPTII,S$GLB,, | Performed by: PHYSICIAN ASSISTANT

## 2022-11-28 PROCEDURE — 1159F MED LIST DOCD IN RCRD: CPT | Mod: CPTII,S$GLB,, | Performed by: PHYSICIAN ASSISTANT

## 2022-11-28 PROCEDURE — 1160F PR REVIEW ALL MEDS BY PRESCRIBER/CLIN PHARMACIST DOCUMENTED: ICD-10-PCS | Mod: CPTII,S$GLB,, | Performed by: PHYSICIAN ASSISTANT

## 2022-11-28 PROCEDURE — 99999 PR PBB SHADOW E&M-EST. PATIENT-LVL III: ICD-10-PCS | Mod: PBBFAC,,, | Performed by: PHYSICIAN ASSISTANT

## 2022-11-28 PROCEDURE — 73110 XR WRIST COMPLETE 3 VIEWS LEFT: ICD-10-PCS | Mod: 26,LT,, | Performed by: RADIOLOGY

## 2022-11-28 PROCEDURE — 99213 PR OFFICE/OUTPT VISIT, EST, LEVL III, 20-29 MIN: ICD-10-PCS | Mod: S$GLB,,, | Performed by: PHYSICIAN ASSISTANT

## 2022-11-28 PROCEDURE — 73110 X-RAY EXAM OF WRIST: CPT | Mod: 26,LT,, | Performed by: RADIOLOGY

## 2022-11-28 PROCEDURE — 99213 OFFICE O/P EST LOW 20 MIN: CPT | Mod: S$GLB,,, | Performed by: PHYSICIAN ASSISTANT

## 2022-11-28 NOTE — PROGRESS NOTES
11/28/2022    HPI:  Vee Navarro is a 57 y.o. female, who presents to clinic today for continued evaluation of her left distal radius fracture.  She is approximately 6 weeks status post injury.  States pain is 0/10 at rest.  States pain is mild with movement.  States she has worn her removal Velcro short wrist splint as instructed.  States he has limited weight-bearing as instructed.  Denies any acute injuries since last visit.  Denies any paresthesias.  Denies any other complaints at this time.    PMHX:  Past Medical History:   Diagnosis Date    Acid reflux     Acute medial meniscus tear of right knee 04/2022    Arthritis     Dialysis patient     resolved w/ transplant    Encounter for blood transfusion     End stage kidney disease     [place] (hard of hearing)     Hypertension     Lumbar radiculopathy     Peritonitis     Short-term memory loss     Thyroid disease     Vitamin B12 deficiency        PSHX:  Past Surgical History:   Procedure Laterality Date    arm surgery Left     ARTHROSCOPY OF KNEE Right 7/26/2022    Procedure: ARTHROSCOPY, KNEE;  Surgeon: Tam Rosario MD;  Location: [facility];  Service: Orthopedics;  Laterality: Right;    BACK SURGERY  1982    CARPAL TUNNEL RELEASE Left 8/3/2020    Procedure: RELEASE, CARPAL TUNNEL;  Surgeon: MIKO Fishman MD;  Location: [facility];  Service: Orthopedics;  Laterality: Left;    COLONOSCOPY N/A 12/12/2017    Procedure: COLONOSCOPY;  Surgeon: Elpidio Casillas Jr., MD;  Location: [facility] ENDO;  Service: Endoscopy;  Laterality: N/A;    COLONOSCOPY N/A 1/25/2021    Procedure: COLONOSCOPY;  Surgeon: Elpidio Casillas Jr., MD;  Location: [facility] ENDO;  Service: Endoscopy;  Laterality: N/A;    CORONARY ANGIOGRAPHY N/A 7/14/2021    Procedure: ANGIOGRAM, CORONARY ARTERY;  Surgeon: Corey Aguilar MD;  Location: [facility] CATH;  Service: Cardiovascular;  Laterality: N/A;    DIALYSIS FISTULA CREATION      EXCISION OF MEDIAL MENISCUS OF KNEE Right 7/26/2022    Procedure: MENISCECTOMY,  KNEE, MEDIAL;  Surgeon: Tam Rosario MD;  Location: [facility];  Service: Orthopedics;  Laterality: Right;  partial     FOOT SURGERY  2019    FRACTURE SURGERY      KIDNEY STONE SURGERY  1983    KIDNEY TRANSPLANT Right 10/2021    KNEE ARTHROSCOPY Right 2022    right knee scope    LAPAROSCOPIC REVISION OF PROCEDURE INVOLVING PERITONEAL DIALYSIS CATHETER N/A 2018    Procedure: REVISION OF PROCEDURE INVOLVING PERITONEAL DIALYSIS CATHETER, LAPAROSCOPIC;  Surgeon: Kannan Piedra MD;  Location: [facility];  Service: General;  Laterality: N/A;    LEFT HEART CATHETERIZATION Left 2021    Procedure: Left heart cath;  Surgeon: Corey Aguilar MD;  Location: [facility];  Service: Cardiovascular;  Laterality: Left;    NEUROMA SURGERY Left     knee    OPEN REDUCTION AND INTERNAL FIXATION (ORIF) OF FRACTURE OF DISTAL RADIUS Left 8/3/2020    Procedure: ORIF, FRACTURE,/ BONE GRAFT/ ORIF Scaphoid;  Surgeon: MIKO Fishman MD;  Location: [facility];  Service: Orthopedics;  Laterality: Left;  Dr. Fishman completed his surgery at 1159    OTHER SURGICAL HISTORY      peritoneal catheter move X2    PERITONEAL CATHETER REMOVAL N/A 2019    Procedure: REMOVAL, CATHETER, DIALYSIS, PERITONEAL;  Surgeon: Edilson Sherman MD;  Location: [facility];  Service: General;  Laterality: N/A;    PLACEMENT PERITONEAL DIALYSIS CATHETER         FMHX:  Family History   Problem Relation Age of Onset    Mental illness Brother     Heart disease Maternal Grandmother     Cancer Maternal Grandfather     Stroke Paternal Grandmother     Cancer Paternal Grandfather     No Known Problems Brother     Arthritis Mother     Hypertension Father     Suicide Father        SOCHX:  Social History     Tobacco Use    Smoking status: Former     Packs/day: 0.66     Years: 42.00     Pack years: 27.72     Types: Cigarettes     Start date:      Quit date: 2020     Years since quittin.5    Smokeless tobacco: Never    Tobacco comments:     quit  "a couple of times previously   Substance Use Topics    Alcohol use: No       ALLERGIES:  Morphine    CURRENT MEDICATIONS:  Current Outpatient Medications on File Prior to Visit   Medication Sig Dispense Refill    alendronate (FOSAMAX) 70 MG tablet Take 1 tablet (70 mg total) by mouth every 7 days. 4 tablet 5    azaTHIOprine (IMURAN) 50 mg Tab Take 50 mg by mouth once daily.      buprenorphine-naloxone 8-2 mg (SUBOXONE) 8-2 mg 1.5-2 each daily as needed.      cinacalcet (SENSIPAR) 30 MG Tab 60 mg daily with breakfast.      cyanocobalamin 1,000 mcg/mL injection INJECT 1 ML ONCE DAILY FOR 7 DAYS, THEN 1 ML ONCE A WEEK FOR A MONTH, THEN ONCE MONTHLY 10 mL 3    HYDROcodone-acetaminophen (NORCO)  mg per tablet Take 1 tablet by mouth every 4 (four) hours as needed (pain). (Patient not taking: Reported on 11/7/2022) 25 tablet 0    multivit with minerals/lutein (MULTIVITAMIN 50 PLUS ORAL) ONCE DAILY      mycophenolate (MYFORTIC) 360 MG TbEC Take 360 mg by mouth. Every Monday and Thursday      ondansetron (ZOFRAN-ODT) 4 MG TbDL DISSOLVE ONE TABLET BY MOUTH EVERY 8 HOURS AS NEEDED FOR NAUSEA      pantoprazole (PROTONIX) 40 MG tablet Take 40 mg by mouth 2 (two) times daily.      PHOSPHA 250 NEUTRAL 250 mg Tab Take 1 tablet by mouth 3 (three) times daily.      pregabalin (LYRICA) 100 MG capsule Take 100 mg by mouth every evening.      sulfamethoxazole-trimethoprim 800-160mg (BACTRIM DS) 800-160 mg Tab Take 1 tablet by mouth on Monday, Wednesday, and Friday of every week to prevent PCP infection.      syringe with needle 3 mL 23 gauge x 1 1/2" Syrg 1 each by Misc.(Non-Drug; Combo Route) route once daily at 6am. Use to administer B-12 injections. 30 each 11    tacrolimus (PROGRAF) 1 MG Cap 7 capsules daily (3 am; 4 pm)      valGANciclovir (VALCYTE) 450 mg Tab 450 mg once daily.      varenicline (CHANTIX) 1 mg Tab TAKE ONE TABLET BY MOUTH TWICE A DAY 60 tablet 2     Current Facility-Administered Medications on File Prior to " "Visit   Medication Dose Route Frequency Provider Last Rate Last Admin    0.9%  NaCl infusion  20 mL/hr Intravenous Continuous Yeimy Hickman MD 20 mL/hr at 09/17/18 0900 20 mL/hr at 09/17/18 0900    0.9%  NaCl infusion   Intravenous Continuous Albin Javier MD   Stopped at 08/03/20 1242    lidocaine (PF) 10 mg/ml (1%) injection 2 mg  0.2 mL Intradermal Once Albin Javier MD        sodium chloride 0.9% flush 10 mL  10 mL Intravenous PRN Faizan Jarrell MD           REVIEW OF SYSTEMS:  Review of Systems Complete; Negative, unless noted above.    GENERAL PHYSICAL EXAM:   Ht 5' 7" (1.702 m)   Wt 55.3 kg (122 lb)   LMP  (LMP Unknown)   BMI 19.11 kg/m²    GEN: well developed, well nourished, no acute distress   PULM: No wheezing, no respiratory distress   CV: RRR    ORTHO EXAM:   Examination left wrist reveals no edema, erythema, ecchymosis, or skin breakdown.  Able to make composite fist and fully extend all fingers.  Moderately reduced range of motion of the left wrist.  Strength not tested secondary to injury.  Normal sensation in the radial, ulnar, and median nerve distributions.  Capillary refill less than 2 seconds in all fingers.    RADIOLOGY:   X-rays of left wrist were taken today in clinic.  X-rays reviewed by myself.  X-rays were compared to previous imaging.  Imaging showed the presence of an extra-articular fracture of the distal radius of the left wrist with routine healing.  No significant change in position or alignment compared to previous imaging.  Progressive interval healing when compared to previous imaging.  Continued presence of collapse of the proximal row of the left wrist.  No other significant bony abnormalities noted.    ASSESSMENT:   Distal radius fracture of the left wrist with routine healing, SNAC wrist of the left wrist    PLAN:  1. I discussed with Vee Navarro that she continues to progress appropriately in the treatment course.  We discussed the best course " of action this time is continue immobilization via her removable Velcro short wrist splint.  We discussed him begin gentle range-of-motion exercises of the left wrist/hand as demonstrated in clinic.  We discussed importance of continued limited weight-bearing of the left hand/arm until seen again in clinic.  She verbally agreed with the treatment plan.      2. I would like him follow-up in clinic in 2 weeks at which time we will perform repeat x-rays of the left wrist.  She was instructed to contact the clinic for any problems or concerns in the interim.

## 2022-12-02 ENCOUNTER — TELEPHONE (OUTPATIENT)
Dept: DERMATOLOGY | Facility: CLINIC | Age: 57
End: 2022-12-02
Payer: MEDICARE

## 2022-12-02 NOTE — TELEPHONE ENCOUNTER
----- Message from Jaelyn Robles sent at 12/2/2022  9:26 AM CST -----  Regarding: Appointment  Contact: Patient   Type:  Sooner Appointment Request    Caller is requesting a sooner appointment.  Caller declined first available appointment listed below.  Caller will not accept being placed on the waitlist and is requesting a message be sent to doctor.    Name of Caller:  Patient  When is the first available appointment?    Symptoms:  Spot on the nose.  Best Call Back Number:  [phone]  Additional Information:  Please call patient. Thanks!

## 2022-12-06 DIAGNOSIS — S52.552D OTHER CLOSED EXTRA-ARTICULAR FRACTURE OF DISTAL END OF LEFT RADIUS WITH ROUTINE HEALING, SUBSEQUENT ENCOUNTER: Primary | ICD-10-CM

## 2023-01-25 ENCOUNTER — IMMUNIZATION (OUTPATIENT)
Dept: FAMILY MEDICINE | Facility: CLINIC | Age: 58
End: 2023-01-25
Payer: MEDICARE

## 2023-01-25 DIAGNOSIS — Z23 NEED FOR VACCINATION: Primary | ICD-10-CM

## 2023-01-25 PROCEDURE — 91312 COVID-19, MRNA, LNP-S, BIVALENT BOOSTER, PF, 30 MCG/0.3 ML DOSE: CPT | Mod: S$GLB,,, | Performed by: FAMILY MEDICINE

## 2023-01-25 PROCEDURE — 0124A COVID-19, MRNA, LNP-S, BIVALENT BOOSTER, PF, 30 MCG/0.3 ML DOSE: CPT | Mod: PBBFAC | Performed by: FAMILY MEDICINE

## 2023-01-25 PROCEDURE — 91312 COVID-19, MRNA, LNP-S, BIVALENT BOOSTER, PF, 30 MCG/0.3 ML DOSE: ICD-10-PCS | Mod: S$GLB,,, | Performed by: FAMILY MEDICINE

## 2023-01-31 ENCOUNTER — OFFICE VISIT (OUTPATIENT)
Dept: OTOLARYNGOLOGY | Facility: CLINIC | Age: 58
End: 2023-01-31
Payer: MEDICARE

## 2023-01-31 ENCOUNTER — CLINICAL SUPPORT (OUTPATIENT)
Dept: AUDIOLOGY | Facility: CLINIC | Age: 58
End: 2023-01-31
Payer: MEDICARE

## 2023-01-31 VITALS — TEMPERATURE: 99 F | HEIGHT: 67 IN | WEIGHT: 121.25 LBS | BODY MASS INDEX: 19.03 KG/M2

## 2023-01-31 DIAGNOSIS — H90.3 ASYMMETRIC SNHL (SENSORINEURAL HEARING LOSS): Primary | ICD-10-CM

## 2023-01-31 DIAGNOSIS — H91.90 HEARING LOSS, UNSPECIFIED HEARING LOSS TYPE, UNSPECIFIED LATERALITY: ICD-10-CM

## 2023-01-31 DIAGNOSIS — H93.13 TINNITUS, BILATERAL: ICD-10-CM

## 2023-01-31 DIAGNOSIS — H93.13 BILATERAL TINNITUS: ICD-10-CM

## 2023-01-31 DIAGNOSIS — H90.3 BILATERAL SENSORINEURAL HEARING LOSS: Primary | ICD-10-CM

## 2023-01-31 PROCEDURE — 92567 PR TYMPA2METRY: ICD-10-PCS | Mod: S$GLB,,, | Performed by: AUDIOLOGIST

## 2023-01-31 PROCEDURE — 99214 OFFICE O/P EST MOD 30 MIN: CPT | Mod: S$GLB,,, | Performed by: NURSE PRACTITIONER

## 2023-01-31 PROCEDURE — 1159F MED LIST DOCD IN RCRD: CPT | Mod: CPTII,S$GLB,, | Performed by: NURSE PRACTITIONER

## 2023-01-31 PROCEDURE — 99999 PR PBB SHADOW E&M-EST. PATIENT-LVL III: ICD-10-PCS | Mod: PBBFAC,,, | Performed by: NURSE PRACTITIONER

## 2023-01-31 PROCEDURE — 3008F PR BODY MASS INDEX (BMI) DOCUMENTED: ICD-10-PCS | Mod: CPTII,S$GLB,, | Performed by: NURSE PRACTITIONER

## 2023-01-31 PROCEDURE — 99999 PR PBB SHADOW E&M-EST. PATIENT-LVL II: CPT | Mod: PBBFAC,,,

## 2023-01-31 PROCEDURE — 99999 PR PBB SHADOW E&M-EST. PATIENT-LVL II: ICD-10-PCS | Mod: PBBFAC,,,

## 2023-01-31 PROCEDURE — 92557 COMPREHENSIVE HEARING TEST: CPT | Mod: S$GLB,,, | Performed by: AUDIOLOGIST

## 2023-01-31 PROCEDURE — 92557 PR COMPREHENSIVE HEARING TEST: ICD-10-PCS | Mod: S$GLB,,, | Performed by: AUDIOLOGIST

## 2023-01-31 PROCEDURE — 99214 PR OFFICE/OUTPT VISIT, EST, LEVL IV, 30-39 MIN: ICD-10-PCS | Mod: S$GLB,,, | Performed by: NURSE PRACTITIONER

## 2023-01-31 PROCEDURE — 99999 PR PBB SHADOW E&M-EST. PATIENT-LVL III: CPT | Mod: PBBFAC,,, | Performed by: NURSE PRACTITIONER

## 2023-01-31 PROCEDURE — 92567 TYMPANOMETRY: CPT | Mod: S$GLB,,, | Performed by: AUDIOLOGIST

## 2023-01-31 PROCEDURE — 1159F PR MEDICATION LIST DOCUMENTED IN MEDICAL RECORD: ICD-10-PCS | Mod: CPTII,S$GLB,, | Performed by: NURSE PRACTITIONER

## 2023-01-31 PROCEDURE — 3008F BODY MASS INDEX DOCD: CPT | Mod: CPTII,S$GLB,, | Performed by: NURSE PRACTITIONER

## 2023-01-31 NOTE — PROGRESS NOTES
Vee Navarro was seen 01/31/2023 for an audiological evaluation.     Pt reported bilateral hearing loss with worsening in the hearing in her right ear about one year ago.  She has bilateral tinnitus as well (right>left).      Otoscopy revealed clear view of both external ear canals and tympanic membranes.  No obstructive cerumen present in either ear canal.       Audiogram results revealed an asymmetrical mild-to-profound sensorineural hearing loss bilaterally.  Speech Reception Thresholds were  30 dBHL for the right ear and 15 dBHL for the left ear.  Word recognition scores were fair for the right ear and good for the left ear.   Tympanograms were Type A for the right ear and Type A for the left ear.    Audiogram results were reviewed in detail with patient and all questions were answered. Results will be reviewed by ENT at the completion of this note.     Recommend ENT consult due to asymmetrical SNHL, binaural amplification pending medical clearance, hearing protection for all loud sounds and annual audiogram to monitor hearing loss.

## 2023-01-31 NOTE — PATIENT INSTRUCTIONS
Tinnitus (Ringing in the Ears)  Tinnitus is the term for a noise in your ear not caused by an outside sound. The noise might be a ringing, buzzing, hissing, roaring. It can vary in pitch and may be soft or quite loud. For some people, tinnitus is a minor nuisance. But for others, the noise can make it hard to hear, work, and even sleep. When tinnitus can't be cured, a number of treatments may offer relief.  What causes tinnitus?  Loud noises, hearing loss, and ear wax can cause tinnitus. So can certain medicines. Large amounts of aspirin or caffeine are sometimes to blame. In many cases, the exact cause of tinnitus is unknown.  How is tinnitus treated?  Identifying and removing the cause is the best way to treat tinnitus. For that reason, your healthcare provider may refer you to an otolaryngologist (ear, nose, and throat doctor). Your hearing may also be checked by an audiologist (hearing specialist). If you have hearing loss, wearing a hearing aid may help your tinnitus. When the cause can't be found, the tinnitus itself may be treated. Some of the treatments are listed below, and your healthcare provider can tell you more about them:  Avoid exposure to loud sounds, which will exacerbate tinnitus.  Wear ear protection around loud noises.  Get your blood pressure check regularly.  If it is high, see your doctor.  Check with your PCP whether labs can be done to check for anemia and hyperthyroid, as these can worsen tinnitus.   Decrease salt intake.  Avoid stimulants such as caffeine and tobacco.  Exercise daily to improve circulation. Poor circulation worsens tinnitus.   Avoid sleep deprivation. Sleep deprivation and insomnia can worsen tinnitus. Get adequate rest.  Reduce aspirin use, if possible. Aspirin as well as NSAIDs and narcotic pain relievers can exacerbate tinnitus.   Stop worrying about the noise.  Stress worsens tinnitus. Recognize the noise as an annoyance and learn to ignore it as much as possible.  Patients with higher rates of anxiety, depression, concentration, or problems sleeping may need to discuss taking something for anxiety or depression with their primary care provider.     Consider a trial of lipoflavonoids, slow-release niacin, or Arches' Tinnitus Formula (over-the-counter).  Purchase a white noise machine to mask tinnitus.  The [employer] Tinnitus Relief diana on your smart phone uses a combination of sounds and relaxing exercises that aim to distract your brain from focusing on tinnitus. Over time the brain learns to focus less on the tinnitus.   When no underlying pathology can be identified, an audiogram is needed to screen for hearing loss. If hearing loss is noted, hearing aids with masking technology are recommended to help relieve tinnitus.   Maskers are small devices that look like hearing aids. They emit a pleasant sound that helps cover up the ringing in your ears, similar to the technology used in noise-cancelling headphones. Hearing aids and maskers are sometimes used together.  Cognitive Behavioral Therapy (CBT), otherwise known as Tinnitus Retraining Therapy (TRT), can be done by either an audiologist or a cognitive behavioral therapist who is certified in TRT. Tinnitus retraining therapy combines biofeedback, counseling and maskers.   Medicines that treat anxiety and depression may ease tinnitus in some people.  Hypnosis or relaxation therapy may help noise seem less severe.    First-line treatment for tinnitus:  Elimination of exacerbating factors such as elevated blood pressure, high sodium intake, caffeine/stimulants, sleep deprivation/insomnia, certain medications, aspirin, exposure to loud sounds, etc. White noise is recommended as first-line treatment.    Second-line treatment for tinnitus:  Maskers (with or without the use of a hearing aid depending on the outcome of your hearing test) and/or Cognitive Behavior Therapy (Tinnitus Retraining Therapy).  To find an audiologist or  Cognitive Behavioral Therapist in your area who is certified in Tinnitus Retraining Therapy, you must contact the American Tinnitus Association at 1-230.119.7979 or www.flory.org.    For more information  American Speech-Hearing-Language Association [phone] www.[place].org  American Tinnitus Association [phone] www.flory.org  National [place] on Deafness and other Communication Disorders [phone] www.nidcd.nih.gov

## 2023-01-31 NOTE — PROGRESS NOTES
Subjective:       Patient ID: Vee Navarro is a 57 y.o. female.    Chief Complaint: No chief complaint on file.    HPI   Patient last seen 7 months ago for a variety of ENT concerns including chronic imbalance (reportedly told it was antibiotic related) and recurrent cerumen impactions. Patient returns today for difficulty hearing AD>AS. Her mother has hearing loss. Denies otologic h/o surgery or trauma. No noise exposure. 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 Dialysis patient (peritoneal) until she received a kidney transplant.     Review of Systems   Constitutional: Negative.    HENT:  Positive for hearing loss and tinnitus.    Eyes: Negative.    Respiratory: Negative.     Cardiovascular: Negative.    Gastrointestinal: Negative.    Musculoskeletal: Negative.    Integumentary:  Negative.   Neurological: Negative.    Hematological: Negative.    Psychiatric/Behavioral: Negative.         Objective:      Physical Exam  Vitals and nursing note reviewed.   Constitutional:       General: She is not in acute distress.     Appearance: She is well-developed. She is not ill-appearing or diaphoretic.   HENT:      Head: Normocephalic and atraumatic.      Right Ear: Hearing, tympanic membrane, ear canal and external ear normal. No middle ear effusion. Tympanic membrane is not erythematous.      Left Ear: Hearing, tympanic membrane, ear canal and external ear normal.  No middle ear effusion. Tympanic membrane is not erythematous.      Nose: Nose normal.   Eyes:      General: Lids are normal. No scleral icterus.        Right eye: No discharge.         Left eye: No discharge.   Neck:      Trachea: Trachea normal. No tracheal deviation.   Cardiovascular:      Rate and Rhythm: Normal rate.   Pulmonary:      Effort: Pulmonary effort is normal. No respiratory distress.      Breath sounds: No stridor. No wheezing.   Musculoskeletal:         General: Normal range of motion.      Cervical back: Normal range of motion and neck supple.   Skin:     General:  Skin is warm and dry.      Coloration: Skin is not pale.   Neurological:      Mental Status: She is alert and oriented to person, place, and time.      Coordination: Coordination normal.      Gait: Gait normal.   Psychiatric:         Speech: Speech normal.         Behavior: Behavior normal. Behavior is cooperative.         Thought Content: Thought content normal.         Judgment: Judgment normal.              Assessment:       Problem List Items Addressed This Visit    None  Visit Diagnoses       Bilateral sensorineural hearing loss    -  Primary    Tinnitus, bilateral                  Plan:     Repeat audiogram in one year to monitor asymmetry    PATIENT IS MEDICALLY CLEARED FOR HEARING AIDS. The patient's hearing loss is not due to temporarily, correctable physical condition. There are no contraindications to hearing aid candidacy. Patient's audiogram reveals the patient is a candidate for amplification. Audiogram is reviewed in detail with the patient. The audiologist's recommendation that the patient have amplification/hearing aids is discussed and questions answered. Patient has been given information by the audiologist on how to schedule a hearing aid consultation. Patient is encouraged to wear ear protection in loud noise and return annually for hearing test. Return to clinic as needed for further ENT concerns.

## 2023-02-24 PROBLEM — M81.0 OSTEOPOROSIS WITHOUT CURRENT PATHOLOGICAL FRACTURE: Status: ACTIVE | Noted: 2023-02-24

## 2023-02-24 PROBLEM — D84.9 IMMUNOSUPPRESSION: Status: ACTIVE | Noted: 2023-02-24

## 2023-02-27 ENCOUNTER — OFFICE VISIT (OUTPATIENT)
Dept: ORTHOPEDICS | Facility: CLINIC | Age: 58
End: 2023-02-27
Payer: MEDICARE

## 2023-02-27 DIAGNOSIS — M65.30 TRIGGER FINGER, UNSPECIFIED FINGER, UNSPECIFIED LATERALITY: ICD-10-CM

## 2023-02-27 PROCEDURE — 20550 TENDON SHEATH: ICD-10-PCS | Mod: RT,S$GLB,, | Performed by: ORTHOPAEDIC SURGERY

## 2023-02-27 PROCEDURE — 20550 NJX 1 TENDON SHEATH/LIGAMENT: CPT | Mod: RT,S$GLB,, | Performed by: ORTHOPAEDIC SURGERY

## 2023-02-27 PROCEDURE — 1159F PR MEDICATION LIST DOCUMENTED IN MEDICAL RECORD: ICD-10-PCS | Mod: CPTII,S$GLB,, | Performed by: ORTHOPAEDIC SURGERY

## 2023-02-27 PROCEDURE — 99999 PR PBB SHADOW E&M-EST. PATIENT-LVL III: CPT | Mod: PBBFAC,,, | Performed by: ORTHOPAEDIC SURGERY

## 2023-02-27 PROCEDURE — 1159F MED LIST DOCD IN RCRD: CPT | Mod: CPTII,S$GLB,, | Performed by: ORTHOPAEDIC SURGERY

## 2023-02-27 PROCEDURE — 99203 PR OFFICE/OUTPT VISIT, NEW, LEVL III, 30-44 MIN: ICD-10-PCS | Mod: 25,S$GLB,, | Performed by: ORTHOPAEDIC SURGERY

## 2023-02-27 PROCEDURE — 99999 PR PBB SHADOW E&M-EST. PATIENT-LVL III: ICD-10-PCS | Mod: PBBFAC,,, | Performed by: ORTHOPAEDIC SURGERY

## 2023-02-27 PROCEDURE — 99203 OFFICE O/P NEW LOW 30 MIN: CPT | Mod: 25,S$GLB,, | Performed by: ORTHOPAEDIC SURGERY

## 2023-02-27 RX ORDER — TRIAMCINOLONE ACETONIDE 40 MG/ML
40 INJECTION, SUSPENSION INTRA-ARTICULAR; INTRAMUSCULAR
Status: DISCONTINUED | OUTPATIENT
Start: 2023-02-27 | End: 2023-02-27 | Stop reason: HOSPADM

## 2023-02-27 RX ADMIN — TRIAMCINOLONE ACETONIDE 40 MG: 40 INJECTION, SUSPENSION INTRA-ARTICULAR; INTRAMUSCULAR at 01:02

## 2023-02-27 NOTE — PROGRESS NOTES
2/27/2023    Chief Complaint:  Chief Complaint   Patient presents with    Right Hand - Pain       HPI:  Vee Navarro is a 57 y.o. female, who presents to clinic today she has a history of catching and pain to the right middle finger.  She states that this has been going on for couple of months.  She states that it is getting worse to the point that it is no longer able to fully extend.  She is here today to discuss further treatment    PMHX:  Past Medical History:   Diagnosis Date    Acid reflux     Acute medial meniscus tear of right knee 04/2022    Arthritis     Dialysis patient     resolved w/ transplant    Encounter for blood transfusion     End stage kidney disease     [place] (hard of hearing)     Hypertension     Lumbar radiculopathy     Peritonitis     Short-term memory loss     Thyroid disease     Vitamin B12 deficiency        PSHX:  Past Surgical History:   Procedure Laterality Date    arm surgery Left     ARTHROSCOPY OF KNEE Right 7/26/2022    Procedure: ARTHROSCOPY, KNEE;  Surgeon: Tam Rosario MD;  Location: [facility];  Service: Orthopedics;  Laterality: Right;    BACK SURGERY  1982    CARPAL TUNNEL RELEASE Left 8/3/2020    Procedure: RELEASE, CARPAL TUNNEL;  Surgeon: MIKO Fishman MD;  Location: [facility];  Service: Orthopedics;  Laterality: Left;    COLONOSCOPY N/A 12/12/2017    Procedure: COLONOSCOPY;  Surgeon: Elpidio Casillas Jr., MD;  Location: [facility] ENDO;  Service: Endoscopy;  Laterality: N/A;    COLONOSCOPY N/A 1/25/2021    Procedure: COLONOSCOPY;  Surgeon: Elpidio Casillas Jr., MD;  Location: [facility] ENDO;  Service: Endoscopy;  Laterality: N/A;    CORONARY ANGIOGRAPHY N/A 7/14/2021    Procedure: ANGIOGRAM, CORONARY ARTERY;  Surgeon: Corey Aguilar MD;  Location: [facility] CATH;  Service: Cardiovascular;  Laterality: N/A;    DIALYSIS FISTULA CREATION      EXCISION OF MEDIAL MENISCUS OF KNEE Right 7/26/2022    Procedure: MENISCECTOMY, KNEE, MEDIAL;  Surgeon: Tam Rosario MD;  Location: [facility];   Service: Orthopedics;  Laterality: Right;  partial     FOOT SURGERY  2019    FRACTURE SURGERY      KIDNEY STONE SURGERY  1983    KIDNEY TRANSPLANT Right 10/2021    KNEE ARTHROSCOPY Right 2022    right knee scope    LAPAROSCOPIC REVISION OF PROCEDURE INVOLVING PERITONEAL DIALYSIS CATHETER N/A 2018    Procedure: REVISION OF PROCEDURE INVOLVING PERITONEAL DIALYSIS CATHETER, LAPAROSCOPIC;  Surgeon: Kannan Piedra MD;  Location: [facility];  Service: General;  Laterality: N/A;    LEFT HEART CATHETERIZATION Left 2021    Procedure: Left heart cath;  Surgeon: Corey Aguilar MD;  Location: [facility] CATH;  Service: Cardiovascular;  Laterality: Left;    NEUROMA SURGERY Left     knee    OPEN REDUCTION AND INTERNAL FIXATION (ORIF) OF FRACTURE OF DISTAL RADIUS Left 8/3/2020    Procedure: ORIF, FRACTURE,/ BONE GRAFT/ ORIF Scaphoid;  Surgeon: MIKO Fishman MD;  Location: [facility];  Service: Orthopedics;  Laterality: Left;  Dr. Fishman completed his surgery at 1159    OTHER SURGICAL HISTORY      peritoneal catheter move X2    PERITONEAL CATHETER REMOVAL N/A 2019    Procedure: REMOVAL, CATHETER, DIALYSIS, PERITONEAL;  Surgeon: Edilson Sherman MD;  Location: [facility];  Service: General;  Laterality: N/A;    PLACEMENT PERITONEAL DIALYSIS CATHETER         FMHX:  Family History   Problem Relation Age of Onset    Mental illness Brother     Heart disease Maternal Grandmother     Cancer Maternal Grandfather     Stroke Paternal Grandmother     Cancer Paternal Grandfather     No Known Problems Brother     Arthritis Mother     Hypertension Father     Suicide Father        SOCHX:  Social History     Tobacco Use    Smoking status: Former     Packs/day: 0.66     Years: 42.00     Pack years: 27.72     Types: Cigarettes     Start date:      Quit date: 2020     Years since quittin.8    Smokeless tobacco: Never    Tobacco comments:     quit a couple of times previously   Substance Use Topics    Alcohol  "use: No       ALLERGIES:  Morphine    CURRENT MEDICATIONS:  Current Outpatient Medications on File Prior to Visit   Medication Sig Dispense Refill    buprenorphine-naloxone 8-2 mg (SUBOXONE) 8-2 mg 1.5-2 each daily as needed.      cinacalcet (SENSIPAR) 60 MG Tab Take 60 mg by mouth.      cyanocobalamin 1,000 mcg/mL injection INJECT 1 ML ONCE DAILY FOR 7 DAYS, THEN 1 ML ONCE A WEEK FOR A MONTH, THEN ONCE MONTHLY 10 mL 3    multivit with minerals/lutein (MULTIVITAMIN 50 PLUS ORAL) ONCE DAILY      ondansetron (ZOFRAN-ODT) 4 MG TbDL DISSOLVE ONE TABLET BY MOUTH EVERY 8 HOURS AS NEEDED FOR NAUSEA 30 tablet 2    pantoprazole (PROTONIX) 40 MG tablet Take 1 tablet (40 mg total) by mouth 2 (two) times daily. 90 tablet 3    syringe with needle 3 mL 23 gauge x 1 1/2" Syrg 1 each by Misc.(Non-Drug; Combo Route) route once daily at 6am. Use to administer B-12 injections. 30 each 11    tacrolimus (PROGRAF) 1 MG Cap 7 capsules daily (3 am; 4 pm)      varenicline (CHANTIX) 1 mg Tab TAKE ONE TABLET BY MOUTH TWICE A DAY 60 tablet 2    alendronate (FOSAMAX) 70 MG tablet Take 1 tablet (70 mg total) by mouth every 7 days. (Patient not taking: Reported on 2/27/2023) 4 tablet 5    azaTHIOprine (IMURAN) 50 mg Tab Take 50 mg by mouth once daily.      pregabalin (LYRICA) 100 MG capsule Take 100 mg by mouth every evening.       Current Facility-Administered Medications on File Prior to Visit   Medication Dose Route Frequency Provider Last Rate Last Admin    0.9%  NaCl infusion  20 mL/hr Intravenous Continuous Yeimy Hickman MD 20 mL/hr at 09/17/18 0900 20 mL/hr at 09/17/18 0900    0.9%  NaCl infusion   Intravenous Continuous Albin Javier MD   Stopped at 08/03/20 1242    lidocaine (PF) 10 mg/ml (1%) injection 2 mg  0.2 mL Intradermal Once Albin Javier MD        sodium chloride 0.9% flush 10 mL  10 mL Intravenous PRN Faizan Jarrell MD           REVIEW OF SYSTEMS:  ROS    GENERAL PHYSICAL EXAM:   LMP  (LMP Unknown) "    GEN: well developed, well nourished, no acute distress   HENT: Normocephalic, atraumatic   EYES: No discharge, conjunctiva normal   NECK: Supple, non-tender   PULM: No wheezing, no respiratory distress   CV: RRR   ABD: Soft, non-tender    ORTHO EXAM:   Examination the right hand middle finger reveals that there is mild edema.  There are no major skin changes.  Palpation does produce tenderness over the A1 pulley.  Flexion reveals very painful active triggering.  She is unable to fully extend and she is missing 20° of extension at the PIP joint.  She does have sensation intact at the tip of the finger and capillary refill is less than 2 seconds    RADIOLOGY:   None    ASSESSMENT:   Severe right middle finger triggering    PLAN:  1. After informed consent was obtained injection was placed to the right middle finger flexor tendon sheath.  The patient tolerated that well.      2.  She will follow up with me on a p.r.n. basis

## 2023-02-27 NOTE — PROCEDURES
Tendon Sheath    Date/Time: 2/27/2023 1:20 PM  Performed by: Koko Castaneda MD  Authorized by: Koko Castaneda MD     Consent Done?:  Yes (Verbal)  Indications:  Pain  Site marked: the procedure site was marked    Timeout: prior to procedure the correct patient, procedure, and site was verified    Prep: patient was prepped and draped in usual sterile fashion      Local anesthetic:  Lidocaine 1% without epinephrine  Anesthetic total (ml):  0.5    Location:  Long finger  Site:  R long flexor tendon sheath  Needle size:  25 G  Medications:  40 mg triamcinolone acetonide 40 mg/mL (20mg injected)  Patient tolerance:  Patient tolerated the procedure well with no immediate complications

## 2023-08-03 RX ORDER — VARENICLINE TARTRATE 1 MG/1
TABLET, FILM COATED ORAL
Qty: 60 TABLET | Refills: 2 | Status: SHIPPED | OUTPATIENT
Start: 2023-08-03

## 2023-08-17 ENCOUNTER — PATIENT MESSAGE (OUTPATIENT)
Dept: ORTHOPEDICS | Facility: CLINIC | Age: 58
End: 2023-08-17
Payer: MEDICARE

## 2023-09-13 ENCOUNTER — TELEPHONE (OUTPATIENT)
Dept: NEUROLOGY | Facility: CLINIC | Age: 58
End: 2023-09-13
Payer: MEDICARE

## 2023-09-13 NOTE — TELEPHONE ENCOUNTER
----- Message from Babak Braden sent at 9/13/2023  1:45 PM CDT -----  Regarding: sooner appt  Contact: vee at [phone]  Type:  Sooner Appointment Request    Caller is requesting a sooner appointment.  Caller declined first available appointment listed below.  Caller will not accept being placed on the waitlist and is requesting a message be sent to doctor.    Name of Caller:  Vee    When is the first available appointment?  1/17/24    Symptoms:  memory f/u    Best Call Back Number:  [phone]    Additional Information:  pt is currently in school and needs to discuss memory with GABRIELLA Ramos.

## 2023-10-02 ENCOUNTER — TELEPHONE (OUTPATIENT)
Dept: NEUROLOGY | Facility: CLINIC | Age: 58
End: 2023-10-02
Payer: MEDICARE

## 2023-10-02 NOTE — TELEPHONE ENCOUNTER
----- Message from Gaurav Gaston, Patient Care Assistant sent at 10/2/2023  3:59 PM CDT -----  Contact: Pt  Type: Needs Medical Advice    Who Called: Pt  Best Call Back Number: [phone]  Inquiry/Question: Pt is calling to get a letter of her diagnosis where she can get additional time to finish her testing in a paid class she attends. Pt states that the professor is requesting documentation that her memory loss is from her disability. Please advise Thank you~

## 2023-10-03 ENCOUNTER — TELEPHONE (OUTPATIENT)
Dept: NEUROLOGY | Facility: CLINIC | Age: 58
End: 2023-10-03
Payer: MEDICARE

## 2023-10-04 ENCOUNTER — TELEPHONE (OUTPATIENT)
Dept: NEUROLOGY | Facility: CLINIC | Age: 58
End: 2023-10-04
Payer: MEDICARE

## 2023-10-04 NOTE — TELEPHONE ENCOUNTER
Spoke with patient regarding letter needed, patient states that she needs the letter explaining how her memory is due to her kidney disease/dialysis. Patient states that if it effected her heart it could be effecting her memory.

## 2023-10-04 NOTE — TELEPHONE ENCOUNTER
"Spoke with patient regarding message per Jacquelyn Ramos NP,"I am not at liberty to state this.  Based on the memory testing she did with me, it was normal." Patient voiced confusion states that she would like Jacquelyn Ramos NP to call her directly to discuss why memory is not associated to her kidney disease/ dialysis and if not then why is she having memory early on. Explained to patient that Jacquelyn is on able to tell until NP testing Is completed, explained that Jacquelyn is in clinic and closing patient's charts and is unable to call patients. Patient voiced frustration and states that she has had multiply providers call her with no problems. Patient states that she just needs to understand her current issues. Attempted to offer a sooner appointment  October 12 at 9 am patient declined.   "

## 2023-10-05 NOTE — TELEPHONE ENCOUNTER
"Attempted to call patient regarding message, "If she wants a more definitive answer behind her memory issues then she will need Neuropsychological testing. The testing I did with her was within normal range. I cannot give her a definitive diagnosis." Patient did not answer, left message to call back.    "

## 2023-10-24 ENCOUNTER — OFFICE VISIT (OUTPATIENT)
Dept: NEUROLOGY | Facility: CLINIC | Age: 58
End: 2023-10-24
Payer: MEDICARE

## 2023-10-24 VITALS
SYSTOLIC BLOOD PRESSURE: 110 MMHG | HEIGHT: 67 IN | WEIGHT: 130.19 LBS | HEART RATE: 65 BPM | BODY MASS INDEX: 20.43 KG/M2 | DIASTOLIC BLOOD PRESSURE: 60 MMHG

## 2023-10-24 DIAGNOSIS — R41.3 SHORT-TERM MEMORY LOSS: ICD-10-CM

## 2023-10-24 PROCEDURE — 99999 PR PBB SHADOW E&M-EST. PATIENT-LVL V: ICD-10-PCS | Mod: PBBFAC,,, | Performed by: NURSE PRACTITIONER

## 2023-10-24 PROCEDURE — 99499 NO LOS: ICD-10-PCS | Mod: S$GLB,,, | Performed by: NURSE PRACTITIONER

## 2023-10-24 PROCEDURE — 99483 ASSMT & CARE PLN PT COG IMP: CPT | Mod: S$GLB,,, | Performed by: NURSE PRACTITIONER

## 2023-10-24 PROCEDURE — 99499 UNLISTED E&M SERVICE: CPT | Mod: S$GLB,,, | Performed by: NURSE PRACTITIONER

## 2023-10-24 PROCEDURE — 99483 PR ASSMT/CARE PLANNING, PT W/COGN IMPAIRMENT: ICD-10-PCS | Mod: S$GLB,,, | Performed by: NURSE PRACTITIONER

## 2023-10-24 PROCEDURE — 99999 PR PBB SHADOW E&M-EST. PATIENT-LVL V: CPT | Mod: PBBFAC,,, | Performed by: NURSE PRACTITIONER

## 2023-10-24 RX ORDER — POLYETHYLENE GLYCOL 3350, SODIUM SULFATE, SODIUM CHLORIDE, POTASSIUM CHLORIDE, SODIUM ASCORBATE, AND ASCORBIC ACID 7.5-2.691G
KIT ORAL
COMMUNITY
End: 2024-01-23

## 2023-10-24 RX ORDER — HYDROXYZINE HYDROCHLORIDE 50 MG/1
TABLET, FILM COATED ORAL
COMMUNITY
End: 2024-01-23

## 2023-10-24 RX ORDER — OXYCODONE AND ACETAMINOPHEN 10; 325 MG/1; MG/1
TABLET ORAL
COMMUNITY
End: 2024-01-02

## 2023-10-24 RX ORDER — HYDROMORPHONE HYDROCHLORIDE 4 MG/1
TABLET ORAL
COMMUNITY
End: 2024-01-23

## 2023-10-24 RX ORDER — HYDROCODONE BITARTRATE AND ACETAMINOPHEN 7.5; 325 MG/1; MG/1
TABLET ORAL
COMMUNITY
End: 2024-01-02

## 2023-10-24 RX ORDER — CALCIUM ACETATE 667 MG/1
CAPSULE ORAL
COMMUNITY
End: 2024-01-02

## 2023-10-24 RX ORDER — FERRIC CITRATE 210 MG/1
TABLET, COATED ORAL
COMMUNITY
End: 2024-01-02

## 2023-10-24 RX ORDER — GABAPENTIN 100 MG/1
CAPSULE ORAL
COMMUNITY
End: 2024-01-02

## 2023-10-24 RX ORDER — PREGABALIN 75 MG/1
75 CAPSULE ORAL 2 TIMES DAILY
COMMUNITY
Start: 2023-09-26 | End: 2024-01-02

## 2023-10-24 RX ORDER — PREDNISOLONE ACETATE 10 MG/ML
SUSPENSION/ DROPS OPHTHALMIC
COMMUNITY
Start: 2023-08-21 | End: 2024-01-23

## 2023-10-24 RX ORDER — AMOXICILLIN 500 MG/1
500 TABLET, FILM COATED ORAL 3 TIMES DAILY
COMMUNITY
Start: 2023-08-23 | End: 2024-01-02

## 2023-10-24 NOTE — ASSESSMENT & PLAN NOTE
Patient is a 59 y/o female that presents for memory f/u. She reports ongoing STM loss  Onset ~ 2 years ago  There are no reports of hallucinations, behavioral changes, recent falls, urinary incontinence. She does report mild sleep disturbances and endorses mild depression but does not believe she needs to take medication for it.   MMSE today 29/30; stable from previous   - suspect MCI   Vascular etiology?  Most recent MRI brain scan reported with lacunar infarct and small vessel changes    - advised she start taking ASA 81 mg for secondary stroke prevention   Serologies noted  Discussed role vs expectation of cognitive enhancing medications at length.    - not warranted at this time  Consider referral for NP testing in the future.    - pt will decide on this  Discussed ways to promote brain stimulation.   There are no safety concerns.

## 2023-10-24 NOTE — PROGRESS NOTES
Caregiver name: N/A  Relationship to the patient: N/A  Does the patient have a living will? No  Does the patient have a designated healthcare POA? No  Does the patient have a designated general POA? No    Have educational materials/resources been provided? Yes      Activities of Daily Living    Bathing: Independent  Dressing: Independent  Grooming: Independent  Mouth Care: Independent  Toileting: Independent  Transferring Bed/Chair: Independent  Walking: Independent  Climbing: Independent  Eating: Independent      Instrumental Activities of Daily Living    Shopping: Needs Help  Cooking: Independent  Managing Medications: Independent  Using the phone and looking up numbers: Independent  Doing Housework: Independent  Doing Laundry: Independent  Driving or using public transportation: Independent  Managing finances: Independent    Functional Assessment Staging  1   No difficulty, either subjectively or objectively.             10/24/2023     1:07 PM 2/13/2023    10:57 AM 4/11/2019     7:57 AM 5/4/2017    11:00 AM 8/19/2016    10:00 AM   Depression Patient Health Questionnaire   Over the last two weeks how often have you been bothered by little interest or pleasure in doing things Several days Not at all Not at all Not at all Not at all   Over the last two weeks how often have you been bothered by feeling down, depressed or hopeless Not at all Not at all Not at all Not at all Not at all   PHQ-2 Total Score 1 0 0 0 0

## 2023-10-24 NOTE — PROGRESS NOTES
NEUROLOGY  Outpatient Follow Up    [facility]  [address] [facility], [place] [address]  [phone] (office) / [phone] (fax)    Patient Name:  Vee Navarro  :  1965  MR #:  [id number]  Acct #:  [id number]    Date of Neurology Visit: 10/24/2023  Name of Provider: CIRILO Hinds    Other Physicians:  Evelina Gunn MD (Primary Care Physician); No ref. provider found (Referring)      Chief Complaint: Memory Loss        History of Present Illness (HPI):  Vee Navarro is a right handed 56 y.o. female with a PMHX of ESRD, HTN (controlled), Kidney transplant in Oct 2021.   Patient is here today for memory loss. She is solo at today's visit. She is concerned about short term memory. She prides on not ever being a forgetful person. She often misplaces items or forgets things that are told to her. She lives at home with her mother and 2 young boys.      Patient's highest level of education completed was clinton in college. 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 She is working part time. The onset of memory issues began about 1 year ago. She struggles more short term memory loss. She believes that she is more impatient now as she lives with her 86 y/o mother and 2 teenage boys. She endorses mild depression but doesn't believe she needs medications. She denies suicidal ideation. She denies hallucinations. She states her sleep is fair and now has to sleep on her side which is a change for her. She will often wake up in the night but is able to fall back asleep fine. She believes she does well with executive function. She is managing her finances and medications well and without issues. She denies issues with hygiene and able to perform ADLs without assistance. She reports word finding difficulty but denies comprehension issues. She is hard of hearing and has seen an audiologist.   She denies issues with object recognition. She  "denies urinary incontinence. Her last fall was bout 5-6 months. She is still driving and denies recent MVAs. She does report getting lost near her neighborhood stating she couldn't remember where she lived. She reports this immediately after dialysis and not feeling well that day.   She works part time for her business and helps with house work.          Interval Hx 11/16/2022:  Patient is here today for balance issues. She reports having had a problem with balance for many years. She has had frequent falls with the last one being 2 months ago. That fall involved tripping over uneven ground. She fractured 2 bones in her wrist. She does suffer osteoporosis and now being treated for it.  She is concerned about losing her balance when turning too fast or leaning over. She denies dizziness, spinning or associated nausea, LOC, vomiting, visual loss, focal weakness or diaphoresis. All recent neuro testing discussed with the patient.       Interval Hx 10/24/2023:  Patient presents today for memory follow up. She was last evaluated in April 2022. She is solo at today's appointment. She reports difficulty with simple memory. She recently completed a fast paced EMT course and reports struggling with it. She did pass the course but states she really struggles with it. She continues to work very sparingly but keeps herself busy with other tasks. There are no significant behavioral changes. She continues to report impatience. She has been trying to find a therapist for anger issues. Her depression is intermittent. She denies hallucinations. She states her sleep is "horrible" and attributes this to her Tacrolimus. This has worsened since last seen. She reports nightmares. She is managing her finances and medications well and without issues. She denies issues with hygiene and able to perform ADLs without assistance. She notices when taking her recent course that she had difficulty spelling. She denies urinary incontinence. Her " last fall was 6 months ago. She is still driving and denies recent MVAs or getting lost in familiar areas.                     Past Medical, Surgical, Family & Social History:   Reviewed and updated.    Home Medications:     Current Outpatient Medications:     alendronate (FOSAMAX) 70 MG tablet, Take 1 tablet (70 mg total) by mouth every 7 days., Disp: 12 tablet, Rfl: 3    cinacalcet (SENSIPAR) 60 MG Tab, Take 60 mg by mouth., Disp: , Rfl:     cyanocobalamin 1,000 mcg/mL injection, INJECT 1 ML ONCE DAILY FOR 7 DAYS, THEN 1 ML ONCE A WEEK FOR A MONTH, THEN ONCE MONTHLY, Disp: 10 mL, Rfl: 3    multivit with minerals/lutein (MULTIVITAMIN 50 PLUS ORAL), ONCE DAILY, Disp: , Rfl:     ondansetron (ZOFRAN-ODT) 4 MG TbDL, DISSOLVE ONE TABLET BY MOUTH EVERY 8 HOURS AS NEEDED FOR NAUSEA, Disp: 30 tablet, Rfl: 2    prednisoLONE acetate (PRED FORTE) 1 % DrpS, Place into both eyes., Disp: , Rfl:     pregabalin (LYRICA) 75 MG capsule, Take 75 mg by mouth 2 (two) times daily., Disp: , Rfl:     tacrolimus (PROGRAF) 1 MG Cap, 7 capsules daily (3 am; 4 pm), Disp: , Rfl:     varenicline (CHANTIX) 1 mg Tab, TAKE 1 TABLET BY MOUTH TWICE A DAY, Disp: 60 tablet, Rfl: 2    amoxicillin (AMOXIL) 500 MG Tab, Take 500 mg by mouth 3 (three) times daily., Disp: , Rfl:     azaTHIOprine (IMURAN) 50 mg Tab, Take 50 mg by mouth once daily., Disp: , Rfl:     buprenorphine-naloxone 8-2 mg (SUBOXONE) 8-2 mg, 1.5-2 each daily as needed., Disp: , Rfl:     calcium acetate,phosphat bind, (PHOSLO) 667 mg capsule, , Disp: , Rfl:     ferric citrate (AURYXIA) 210 mg iron Tab, , Disp: , Rfl:     gabapentin (NEURONTIN) 100 MG capsule, , Disp: , Rfl:     HYDROcodone-acetaminophen (NORCO) 7.5-325 mg per tablet, , Disp: , Rfl:     HYDROmorphone (DILAUDID) 4 MG tablet, , Disp: , Rfl:     hydrOXYzine (ATARAX) 50 MG tablet, , Disp: , Rfl:     oxyCODONE-acetaminophen (PERCOCET)  mg per tablet, , Disp: , Rfl:     pantoprazole (PROTONIX) 40 MG tablet, TAKE 1 TABLET  "BY MOUTH 2 TIMES DAILY (Patient not taking: Reported on 10/24/2023), Disp: 60 tablet, Rfl: 11    polyethylene glycol (MOVIPREP) 100-7.5-2.691 gram solution, TAKE 1 PACKET(S) TWICE A DAY BY ORAL ROUTE AS DIRECTED FOR 1 DAY., Disp: , Rfl:     pregabalin (LYRICA) 100 MG capsule, Take 100 mg by mouth every evening., Disp: , Rfl:     syringe with needle 3 mL 23 gauge x 1 1/2" Syrg, 1 each by Misc.(Non-Drug; Combo Route) route once daily at 6am. Use to administer B-12 injections. (Patient not taking: Reported on 10/24/2023), Disp: 30 each, Rfl: 11    Current Facility-Administered Medications:     sodium chloride 0.9% flush 10 mL, 10 mL, Intravenous, PRN, Faizan Mo MD    Facility-Administered Medications Ordered in Other Visits:     0.9%  NaCl infusion, 20 mL/hr, Intravenous, Continuous, Yeimy Hickman MD, Last Rate: 20 mL/hr at 09/17/18 0900, 20 mL/hr at 09/17/18 0900    0.9%  NaCl infusion, , Intravenous, Continuous, Albin Javier MD, Stopped at 08/03/20 1242    lidocaine (PF) 10 mg/ml (1%) injection 2 mg, 0.2 mL, Intradermal, Once, Albin Javier MD    Physical Examination:  /60 (BP Location: Right arm, Patient Position: Sitting, BP Method: Medium (Automatic))   Pulse 65   Ht 5' 7" (1.702 m)   Wt 59.1 kg (130 lb 2.9 oz)   LMP  (LMP Unknown)   BMI 20.39 kg/m²           GENERAL:  General appearance: Well, non-toxic appearing.  No apparent distress.  Neck: supple.  .     MENTAL STATUS:  Alertness, attention span & concentration: normal.  Language: normal.  Orientation to self, place & time:  normal.  Memory, recent & remote: normal.  Fund of knowledge: normal.  MMSE:29/30  29/30 (4/2022)     SPEECH:  Clear and fluent.  Follows complex commands.     CRANIAL NERVES:  Cranial Nerves II-XII were examined.  II - Visual fields: normal.  III, IV, VI: PERRL, EOMI, No ptosis, No nystagmus.  V - Facial sensation: normal.  VII - Face symmetry & mobility: symmetric  VIII - Hearing: " normal.  IX, X - Palate: normal  XI - Shoulder shrug: normal.  XII - Tongue protrusion: midline     GROSS MOTOR:  Gait & station: mildly antaglic; poor tandem  Tone: normal.  Abnormal movements: none.  Finger-nose: normal.  Rapid alternating movements: normal.  Pronator drift: normal        MUSCLE STRENGTH:      RIGHT      LEFT   5 Biceps 5   5 Triceps 5   5 Forearm.Pr. 5           4+ Iliopsoas flex    4+   5 Hip Abduct 5   5 Hip Adduct 5   4+ Quads 4+   5 Hams 5   5 Dorsiflex 5   5 Plantar Flex 5              REFLEXES:     RIGHT Reflex    LEFT   2+ Biceps 2+   2+ Brachiorad. 2+           1+ Patellar 1+      SENSORY:  Light touch: Normal throughout.                  Diagnostic Data Reviewed:   Folate   Date Value Ref Range Status   04/07/2022 6.2 4.0 - 24.0 ng/mL Final     Vitamin B-12   Date Value Ref Range Status   03/11/2023 >1000 (H) 210 - 950 pg/mL Final     Comment:     Patients taking very high Biotin doses of >300 mcg/day may   cause a positive bias in this assay.       TSH   Date Value Ref Range Status   04/07/2022 1.392 0.400 - 4.000 uIU/mL Final     RPR   Date Value Ref Range Status   04/07/2022 Non-reactive Non-reactive Final          MRI Brain 2016:  Findings: The diffusion-weighted images are negative for acute diffuse ischemia.  There does appear to be some T2 shine through artifact present for example periventricular, high left parietal.  No mass effect, layering hemorrhage or overt hydrocephalus is appreciated.  There is symmetric overall appearance of the sulcal, gyral margins.  There is symmetric overall appearance of the soft tissues and orbital structures.  Unremarkable overall appearance of the vascular flow voids.  The left vertebral artery appears developmentally dominant.  No significant paranasal sinus mucosal thickening or layering fluid levels are appreciated. Mild volume loss and chronic appearing periventricular white matter changes are appreciated. Correlate overall with the patient's  clinical findings and laboratory values.  IMPRESSION:    No mass effect, layering hemorrhage or evidence of acute ischemia is appreciated.  No acute intracranial changes are appreciated.  There do appear to be some scattered chronic appearing periventricular white matter changes bilaterally which could be seen with processes including chronic gliosis, microvascular ischemia with the pattern less typical for demyelination appearance.       MRI brain 4/2022:  FINDINGS:  Intracranial contents:There is no acute or significant abnormality and no definite change in the appearance of the brain compared to the prior study.  There is only mild volume loss with very mild nonspecific periventricular and scattered subcortical white matter FLAIR and T2 hyperintense signal.  These mild white matter changes likely reflect sequelae of chronic small vessel disease.  There is a punctate focus of abnormal signal in the left paramedian findings which could represent a remote punctate lacunar infarction or focus of small vessel disease.  There is no hydrocephalus or midline shift.  There is no hemorrhage.  There are no regions of restricted diffusion to suggest acute infarction.  There is no hydrocephalus or midline shift.  There is no abnormal extra-axial fluid collection.  The basilar cisterns are open.  Flow voids indicating patency are present in the major vessels at the base of the brain.  The cerebellar tonsils are normal position.  Sellar structures are normal.   The orbits are grossly normal.     Extracranial contents, calvarium, soft tissues:There is mild scattered mucosal thickening in the paranasal sinuses.  The mastoid air cells are clear.  Baseline marrow signal is normal.     Impression:     1. There is no acute abnormality.  There is mild volume loss and nonspecific white matter change.  There is no hemorrhage, mass or acute infarction.                 Assessment and Plan:    Problem List Items Addressed This Visit           Neuro    Short-term memory loss    Current Assessment & Plan     Patient is a 59 y/o female that presents for memory f/u. She reports ongoing STM loss  Onset ~ 2 years ago  There are no reports of hallucinations, behavioral changes, recent falls, urinary incontinence. She does report mild sleep disturbances and endorses mild depression but does not believe she needs to take medication for it.   MMSE today 29/30; stable from previous   - suspect MCI   Vascular etiology?  Most recent MRI brain scan reported with lacunar infarct and small vessel changes    - advised she start taking ASA 81 mg for secondary stroke prevention   Serologies noted  Discussed role vs expectation of cognitive enhancing medications at length.    - not warranted at this time  Consider referral for NP testing in the future.    - pt will decide on this  Discussed ways to promote brain stimulation.   There are no safety concerns.                  Important to note, also  has a past medical history of Acid reflux, Acute medial meniscus tear of right knee (04/2022), Arthritis, Dialysis patient, Encounter for blood transfusion, End stage kidney disease, [place] (hard of hearing), Hypertension, Lumbar radiculopathy, Peritonitis, Short-term memory loss, Thyroid disease, and Vitamin B12 deficiency.          The patient will return to clinic in 1 yr    All questions were answered and patient is comfortable with the plan.         Thank you very much for the opportunity to assist in this patient's care.    If you have any questions or concerns, please do not hesitate to contact me at any time.      Sincerely,     CIRILO Hinds  [facility]

## 2023-11-07 ENCOUNTER — PATIENT MESSAGE (OUTPATIENT)
Dept: NEUROLOGY | Facility: CLINIC | Age: 58
End: 2023-11-07
Payer: MEDICARE

## 2023-11-19 ENCOUNTER — PATIENT MESSAGE (OUTPATIENT)
Dept: NEUROLOGY | Facility: CLINIC | Age: 58
End: 2023-11-19
Payer: MEDICARE

## 2023-12-05 ENCOUNTER — TELEPHONE (OUTPATIENT)
Dept: OTOLARYNGOLOGY | Facility: CLINIC | Age: 58
End: 2023-12-05
Payer: MEDICARE

## 2023-12-05 NOTE — TELEPHONE ENCOUNTER
----- Message from Elpidio Sherman sent at 12/5/2023 10:13 AM CST -----  Regarding: sooner appt  Contact: patient  Type:  Sooner Appointment Request    Caller is requesting a sooner appointment.  Caller declined first available appointment listed below.  Caller will not accept being placed on the waitlist and is requesting a message be sent to doctor.    Name of Caller:  Patient   When is the first available appointment?  03/05  Symptoms:  Throat swelling   Would the patient rather a call back or a response via [facility]? Call back  Best Call Back Number:  [phone]  Additional Information:  Please call. Pt stated that she can't wait that long. Thanks

## 2023-12-15 DIAGNOSIS — M25.562 ACUTE PAIN OF LEFT KNEE: Primary | ICD-10-CM

## 2024-02-26 ENCOUNTER — TELEPHONE (OUTPATIENT)
Dept: CARDIOLOGY | Facility: CLINIC | Age: 59
End: 2024-02-26
Payer: MEDICARE

## 2024-02-26 NOTE — TELEPHONE ENCOUNTER
----- Message from Vickie Hoover sent at 2/26/2024  4:35 PM CST -----  Contact: self  Type:  Sooner Appointment Request    Caller is requesting a sooner appointment.  Caller declined first available appointment listed below.  Caller will not accept being placed on the waitlist and is requesting a message be sent to doctor.    Name of Caller:  pt  When is the first available appointment?  N/a  Symptoms:  irregular heart beats  Would the patient rather a call back or a response via [facility]? call  Best Call Back Number:  [phone]   Additional Information:  pt has a referral please call

## 2024-04-12 ENCOUNTER — TELEPHONE (OUTPATIENT)
Dept: CARDIOLOGY | Facility: CLINIC | Age: 59
End: 2024-04-12
Payer: MEDICARE

## 2024-04-12 NOTE — TELEPHONE ENCOUNTER
----- Message from Jennifer Bourgeois sent at 4/12/2024 11:46 AM CDT -----  Regarding: advise  Contact: pt  Type: Needs Medical Advice  Who Called:  patient   Symptoms (please be specific):    How long has patient had these symptoms:    Pharmacy name and phone #:    Best Call Back Number: [phone]    Additional Information: pt is calling back to be r/s MRN: [id number] ELENO RENAE.

## 2024-04-16 ENCOUNTER — TELEPHONE (OUTPATIENT)
Dept: CARDIOLOGY | Facility: CLINIC | Age: 59
End: 2024-04-16
Payer: MEDICARE

## 2024-04-17 ENCOUNTER — TELEPHONE (OUTPATIENT)
Dept: CARDIOLOGY | Facility: CLINIC | Age: 59
End: 2024-04-17
Payer: MEDICARE

## 2024-04-18 ENCOUNTER — TELEPHONE (OUTPATIENT)
Dept: CARDIOLOGY | Facility: CLINIC | Age: 59
End: 2024-04-18
Payer: MEDICARE

## 2024-04-18 NOTE — TELEPHONE ENCOUNTER
----- Message from Elpidio Sherman sent at 4/18/2024 11:47 AM CDT -----  Regarding: sonjosey appt  Contact: patient  Type:  Sooner Appointment Request    Caller is requesting a sooner appointment.  Caller declined first available appointment listed below.  Caller will not accept being placed on the waitlist and is requesting a message be sent to doctor.    Name of Caller:  Patient   When is the first available appointment?  06/12  Symptoms:    Would the patient rather a call back or a response via [facility]? Call back  Best Call Back Number:  [phone]  Additional Information:  Pt declined for available appt due to she doesn't think its far that she have to wait 2 months to see the provider due to he canceled. Please call to advice. Thanks

## 2024-04-23 ENCOUNTER — TELEPHONE (OUTPATIENT)
Dept: CARDIOLOGY | Facility: CLINIC | Age: 59
End: 2024-04-23
Payer: MEDICARE

## 2024-04-23 NOTE — TELEPHONE ENCOUNTER
----- Message from Karlene Domingo sent at 4/23/2024  9:36 AM CDT -----  Type: Needs Medical Advice  Who Called:  pt  Symptoms (please be specific):  pt said she waited 2 months for her appt and the provider canceled and she does not want to wait 2 more months for another appt--said she need to speak to the office ASAP--please call and advise  Best Call Back Number: [phone]    Additional Information: thank you

## 2024-04-23 NOTE — TELEPHONE ENCOUNTER
----- Message from Vickie Hoover sent at 4/23/2024 11:59 AM CDT -----  Contact: self  Type: Needs Medical Advice  Who Called:  pt  Best Call Back Number: [phone]   Additional Information: please call regarding pt's appt

## 2024-05-13 ENCOUNTER — HOSPITAL ENCOUNTER (OUTPATIENT)
Dept: RADIOLOGY | Facility: HOSPITAL | Age: 59
Discharge: HOME | End: 2024-05-13
Attending: PODIATRIST
Payer: MEDICARE

## 2024-05-13 ENCOUNTER — OFFICE VISIT (OUTPATIENT)
Dept: PODIATRY | Facility: CLINIC | Age: 59
End: 2024-05-13
Payer: MEDICARE

## 2024-05-13 VITALS — HEIGHT: 67 IN | WEIGHT: 112 LBS | BODY MASS INDEX: 17.58 KG/M2

## 2024-05-13 DIAGNOSIS — L84 CORN OR CALLUS: ICD-10-CM

## 2024-05-13 DIAGNOSIS — M79.672 FOOT PAIN, LEFT: Primary | ICD-10-CM

## 2024-05-13 DIAGNOSIS — M79.672 FOOT PAIN, LEFT: ICD-10-CM

## 2024-05-13 PROCEDURE — 73630 X-RAY EXAM OF FOOT: CPT | Mod: TC,PO,LT

## 2024-05-13 PROCEDURE — 99999 PR PBB SHADOW E&M-EST. PATIENT-LVL II: CPT | Mod: PBBFAC,,, | Performed by: PODIATRIST

## 2024-05-13 PROCEDURE — 99203 OFFICE O/P NEW LOW 30 MIN: CPT | Mod: S$GLB,,, | Performed by: PODIATRIST

## 2024-05-13 PROCEDURE — 3008F BODY MASS INDEX DOCD: CPT | Mod: CPTII,S$GLB,, | Performed by: PODIATRIST

## 2024-05-13 PROCEDURE — 73630 X-RAY EXAM OF FOOT: CPT | Mod: 26,LT,, | Performed by: RADIOLOGY

## 2024-05-13 PROCEDURE — 3044F HG A1C LEVEL LT 7.0%: CPT | Mod: CPTII,S$GLB,, | Performed by: PODIATRIST

## 2024-05-13 PROCEDURE — 1159F MED LIST DOCD IN RCRD: CPT | Mod: CPTII,S$GLB,, | Performed by: PODIATRIST

## 2024-05-13 NOTE — PROGRESS NOTES
Subjective:     Patient ID: Vee Navarro is a 58 y.o. female.    Chief Complaint: Toe Pain    Vee is a 58 y.o. female with a past medical history of Acid reflux, Acute medial meniscus tear of right knee (04/2022), Arthritis, Dialysis patient, Encounter for blood transfusion, End stage kidney disease, [place] (hard of hearing), Hypertension, Lumbar radiculopathy, Peritonitis, Short-term memory loss, Thyroid disease, and Vitamin B12 deficiency. The patient's chief complaint consists of pain to the distal tip of the Lt. Great toe.  Notes having callus build up that has developed that is a source of pain with wearing closed toe shoes.  Symptoms are alleviated with removal of said shoes.  Notes this has been present for the past several weeks.  Rates pain as a 0/10 while at rest.  She has been filing the lesion every day with limited success.  Notes her arches have collapsed over the past few years.  Denies sustaining trauma to the affected digit.  Denies any additional pedal complaints.     Past Medical History:   Diagnosis Date    Acid reflux     Acute medial meniscus tear of right knee 04/2022    Arthritis     Dialysis patient     resolved w/ transplant    Encounter for blood transfusion     End stage kidney disease     [place] ([place] of hearing)     Hypertension     Lumbar radiculopathy     Peritonitis     Short-term memory loss     Thyroid disease     Vitamin B12 deficiency        Past Surgical History:   Procedure Laterality Date    arm surgery Left     ARTHROSCOPY OF KNEE Right 7/26/2022    Procedure: ARTHROSCOPY, KNEE;  Surgeon: Tam Rosario MD;  Location: [facility];  Service: Orthopedics;  Laterality: Right;    BACK SURGERY  1982    CARPAL TUNNEL RELEASE Left 8/3/2020    Procedure: RELEASE, CARPAL TUNNEL;  Surgeon: MIKO Fishman MD;  Location: [facility];  Service: Orthopedics;  Laterality: Left;    COLONOSCOPY N/A 12/12/2017    Procedure: COLONOSCOPY;  Surgeon: Elpidio Casillas Jr., MD;  Location: [facility];   Service: Endoscopy;  Laterality: N/A;    COLONOSCOPY N/A 1/25/2021    Procedure: COLONOSCOPY;  Surgeon: Elpidio Casillas Jr., MD;  Location: [facility] ENDO;  Service: Endoscopy;  Laterality: N/A;    CORONARY ANGIOGRAPHY N/A 7/14/2021    Procedure: ANGIOGRAM, CORONARY ARTERY;  Surgeon: Corey Aguilar MD;  Location: [facility] CATH;  Service: Cardiovascular;  Laterality: N/A;    DIALYSIS FISTULA CREATION      EXCISION OF MEDIAL MENISCUS OF KNEE Right 7/26/2022    Procedure: MENISCECTOMY, KNEE, MEDIAL;  Surgeon: Tam Rosario MD;  Location: [facility];  Service: Orthopedics;  Laterality: Right;  partial     FOOT SURGERY  03/2019    FRACTURE SURGERY      KIDNEY STONE SURGERY  1983    KIDNEY TRANSPLANT Right 10/2021    KNEE ARTHROSCOPY Right 07/26/2022    right knee scope    LAPAROSCOPIC REVISION OF PROCEDURE INVOLVING PERITONEAL DIALYSIS CATHETER N/A 9/17/2018    Procedure: REVISION OF PROCEDURE INVOLVING PERITONEAL DIALYSIS CATHETER, LAPAROSCOPIC;  Surgeon: Kannan Piedra MD;  Location: [facility];  Service: General;  Laterality: N/A;    LEFT HEART CATHETERIZATION Left 7/14/2021    Procedure: Left heart cath;  Surgeon: Corey Aguilar MD;  Location: [facility] CATH;  Service: Cardiovascular;  Laterality: Left;    NEUROMA SURGERY Left     knee    OPEN REDUCTION AND INTERNAL FIXATION (ORIF) OF FRACTURE OF DISTAL RADIUS Left 8/3/2020    Procedure: ORIF, FRACTURE,/ BONE GRAFT/ ORIF Scaphoid;  Surgeon: MIKO Fishman MD;  Location: [facility];  Service: Orthopedics;  Laterality: Left;  Dr. Fishman completed his surgery at 1159    OTHER SURGICAL HISTORY      peritoneal catheter move X2    PERITONEAL CATHETER REMOVAL N/A 11/23/2019    Procedure: REMOVAL, CATHETER, DIALYSIS, PERITONEAL;  Surgeon: Edilson Sherman MD;  Location: [facility];  Service: General;  Laterality: N/A;    PLACEMENT PERITONEAL DIALYSIS CATHETER  2011       Family History   Problem Relation Name Age of Onset    Mental illness Brother      Heart disease Maternal  "Grandmother      Cancer Maternal Grandfather      Stroke Paternal Grandmother      Cancer Paternal Grandfather      No Known Problems Brother      Arthritis Mother      Hypertension Father      Suicide Father         Social History     Socioeconomic History    Marital status: Single   Tobacco Use    Smoking status: Former     Current packs/day: 0.00     Average packs/day: 0.7 packs/day for 42.3 years (27.9 ttl pk-yrs)     Types: Cigarettes     Start date:      Quit date: 2020     Years since quittin.0    Smokeless tobacco: Never    Tobacco comments:     quit a couple of times previously   Substance and Sexual Activity    Alcohol use: No    Drug use: No    Sexual activity: Not Currently     Partners: Male       Current Outpatient Medications   Medication Sig Dispense Refill    alendronate (FOSAMAX) 70 MG tablet TAKE 1 TABLET (70 MG TOTAL) BY MOUTH EVERY 7 DAYS 12 tablet 3    aspirin (ECOTRIN) 81 MG EC tablet Take 81 mg by mouth once daily.      cinacalcet (SENSIPAR) 60 MG Tab Take 60 mg by mouth.      cyanocobalamin 1,000 mcg/mL injection INJECT 1 ML ONCE DAILY FOR 7 DAYS, THEN 1 ML ONCE A WEEK FOR A MONTH, THEN ONCE MONTHLY 10 mL 3    EScitalopram oxalate (LEXAPRO) 10 MG tablet Take 1 tablet (10 mg total) by mouth once daily. 3 tablet 11    multivit with minerals/lutein (MULTIVITAMIN 50 PLUS ORAL) ONCE DAILY      mycophenolate sodium 360 MG TbEC 60      ondansetron (ZOFRAN-ODT) 4 MG TbDL DISSOLVE ONE TABLET BY MOUTH EVERY 8 HOURS AS NEEDED FOR NAUSEA 30 tablet 2    pantoprazole (PROTONIX) 40 MG tablet TAKE 1 TABLET BY MOUTH 2 TIMES DAILY 60 tablet 11    syringe with needle 3 mL 23 gauge x 1 1/2" Syrg 1 each by Misc.(Non-Drug; Combo Route) route once daily at 6am. Use to administer B-12 injections. 30 each 11    tacrolimus (PROGRAF) 1 MG Cap 7 capsules daily (3 am; 4 pm)      varenicline (CHANTIX) 1 mg Tab TAKE 1 TABLET BY MOUTH TWICE A DAY 60 tablet 2     Current Facility-Administered Medications "   Medication Dose Route Frequency Provider Last Rate Last Admin    sodium chloride 0.9% flush 10 mL  10 mL Intravenous PRN Faizan Mo MD         Facility-Administered Medications Ordered in Other Visits   Medication Dose Route Frequency Provider Last Rate Last Admin    0.9%  NaCl infusion  20 mL/hr Intravenous Continuous Yeimy Hickman MD 20 mL/hr at 09/17/18 0900 20 mL/hr at 09/17/18 0900    0.9%  NaCl infusion   Intravenous Continuous Albin Javier MD   Stopped at 08/03/20 1242    lidocaine (PF) 10 mg/ml (1%) injection 2 mg  0.2 mL Intradermal Once Albin Javier MD           Review of patient's allergies indicates:   Allergen Reactions    Morphine      Other reaction(s): rash, vomiting        Hemoglobin A1C   Date Value Ref Range Status   03/19/2024 5.0 0.0 - 5.6 % Final     Comment:     Reference Interval:  5.0 - 5.6 Normal   5.7 - 6.4 High Risk   > 6.5 Diabetic      Hgb A1c results are standardized based on the (NGSP) National   Glycohemoglobin Standardization Program.      Hemoglobin A1C levels are related to mean serum/plasma glucose   during the preceding 2-3 months.            Review of Systems   Constitutional: Negative for chills and fever.   Cardiovascular:  Negative for claudication and leg swelling.   Skin:  Positive for suspicious lesions. Negative for color change and nail changes.   Musculoskeletal:  Negative for muscle cramps and muscle weakness.   Gastrointestinal:  Negative for nausea and vomiting.   Neurological:  Negative for numbness and paresthesias.   Psychiatric/Behavioral:  Negative for altered mental status.         Objective:     Physical Exam  Constitutional:       Appearance: Normal appearance. She is not ill-appearing.   Cardiovascular:      Pulses:           Dorsalis pedis pulses are 2+ on the right side and 2+ on the left side.        Posterior tibial pulses are 2+ on the right side and 2+ on the left side.      Comments: CFT is < 3 seconds  bilateral.  Pedal hair growth is present bilateral.  No lower extremity edema noted bilateral.  Toes are warm to touch bilateral.    Musculoskeletal:         General: Tenderness present.      Comments: Muscle strength 5/5 in all muscle groups bilateral.  No tenderness nor crepitation with ROM of foot/ankle joints bilateral.  Pain with palpation to the lesion at the distal tip of the Lt. Great toe.  Bulbous appearance to the distal phalanx of the Lt. Great toe.     Skin:     Capillary Refill: Capillary refill takes 2 to 3 seconds.      Findings: Lesion present. No ecchymosis, erythema, signs of injury, laceration, petechiae or wound.      Comments: Pedal skin has normal turgor, temperature, and texture bilateral.  Toenails x 10 appear normotrophic.  Hyperkeratotic lesion noted to the tip of the Lt. Distal phalanx.        Neurological:      General: No focal deficit present.      Mental Status: She is alert.      Sensory: No sensory deficit.      Motor: No weakness or atrophy.      Comments: Light touch is intact bilateral.             Assessment:      Encounter Diagnoses   Name Primary?    Foot pain, left Yes    Corn or callus      Plan:     Vee was seen today for toe pain.    Diagnoses and all orders for this visit:    Foot pain, left  -     X-Ray Foot Complete Left; Future    Corn or callus      I counseled the patient on her conditions, their implications and medical management.    Orders written for an x-ray of the Lt. Foot.    Based on today's exam, I suspect her shoes are too short for her foot type resulting in pressure to the tip of the Lt. Great toe.      Advised to purchase a pair of shoes that are a 1/2 size longer.    Also, advised to apply ebanel lotion to the tip of the digit QD.    If x-rays reveal an exostosis of the distal tip of the distal phalanx, we might consider bone spur excision should this impede ADLs.      RTC prn.    Price Gage DPM

## 2024-05-15 ENCOUNTER — TELEPHONE (OUTPATIENT)
Dept: PODIATRY | Facility: CLINIC | Age: 59
End: 2024-05-15
Payer: MEDICARE

## 2024-05-15 NOTE — TELEPHONE ENCOUNTER
----- Message from Price Gage DPM sent at 5/15/2024  9:42 AM CDT -----  Please let the patient know she does have a bone spur to the tip of the toe as previously thought.  We will still try applying lotion to the area of callus build up and wearing a shoe a 1/2 size bigger to minimize pressure to the toe.  ----- Message -----  From: Interface, Rad Results In  Sent: 5/13/2024  10:48 AM CDT  To: Price Gage DPM

## 2024-05-15 NOTE — TELEPHONE ENCOUNTER
----- Message from Price Gage DPM sent at 5/15/2024  9:42 AM CDT -----  Please let the patient know she does have a bone spur to the tip of the toe as previously thought.  We will still try applying lotion to the area of callus build up and wearing a shoe a 1/2 size bigger to minimize pressure to the toe.  ----- Message -----  From: Interface, Rad Results In  Sent: 5/13/2024  10:48 AM CDT  To: Price Gage DPM   Home

## 2024-05-15 NOTE — TELEPHONE ENCOUNTER
Spoke with the patient to provide results from lab work. Patient expressed understanding of the message.

## 2024-05-15 NOTE — TELEPHONE ENCOUNTER
Called patient to provide with the lab results, phone went to voicemail and a message was left for a call back.

## 2024-05-16 ENCOUNTER — TELEPHONE (OUTPATIENT)
Dept: PODIATRY | Facility: CLINIC | Age: 59
End: 2024-05-16
Payer: MEDICARE

## 2024-05-16 NOTE — TELEPHONE ENCOUNTER
Called patient to reschedule appointment. Phone went to voicemail and a message was left for a call back.

## 2024-06-15 ENCOUNTER — ON-DEMAND VIRTUAL (OUTPATIENT)
Dept: URGENT CARE | Facility: CLINIC | Age: 59
End: 2024-06-15
Payer: MEDICARE

## 2024-06-15 DIAGNOSIS — R09.81 SINUS CONGESTION: Primary | ICD-10-CM

## 2024-06-15 PROCEDURE — 99213 OFFICE O/P EST LOW 20 MIN: CPT | Mod: 95,,, | Performed by: NURSE PRACTITIONER

## 2024-06-15 RX ORDER — CETIRIZINE HYDROCHLORIDE 5 MG/1
10 TABLET, CHEWABLE ORAL DAILY
Qty: 20 TABLET | Refills: 0 | Status: SHIPPED | OUTPATIENT
Start: 2024-06-15 | End: 2024-06-20 | Stop reason: CLARIF

## 2024-06-15 RX ORDER — FLUTICASONE PROPIONATE 50 MCG
1 SPRAY, SUSPENSION (ML) NASAL DAILY
Qty: 18.2 ML | Refills: 0 | Status: SHIPPED | OUTPATIENT
Start: 2024-06-15 | End: 2024-06-20 | Stop reason: CLARIF

## 2024-06-15 NOTE — PROGRESS NOTES
Subjective:      Patient ID: Vee Navarro is a 58 y.o. female.    Vitals:  vitals were not taken for this visit.     Chief Complaint: Sinus Problem      Visit Type: TELE AUDIOVISUAL    Present with the patient at the time of consultation: TELEMED PRESENT WITH PATIENT: None        Past Medical History:   Diagnosis Date    Acid reflux     Acute medial meniscus tear of right knee 04/2022    Arthritis     Dialysis patient     resolved w/ transplant    Encounter for blood transfusion     End stage kidney disease     [place] (hard of hearing)     Hypertension     Lumbar radiculopathy     Peritonitis     Short-term memory loss     Thyroid disease     Vitamin B12 deficiency      Past Surgical History:   Procedure Laterality Date    arm surgery Left     ARTHROSCOPY OF KNEE Right 7/26/2022    Procedure: ARTHROSCOPY, KNEE;  Surgeon: Tam Rosario MD;  Location: [facility];  Service: Orthopedics;  Laterality: Right;    BACK SURGERY  1982    CARPAL TUNNEL RELEASE Left 8/3/2020    Procedure: RELEASE, CARPAL TUNNEL;  Surgeon: MIKO Fishman MD;  Location: [facility];  Service: Orthopedics;  Laterality: Left;    COLONOSCOPY N/A 12/12/2017    Procedure: COLONOSCOPY;  Surgeon: Elpidio Casillas Jr., MD;  Location: [facility];  Service: Endoscopy;  Laterality: N/A;    COLONOSCOPY N/A 1/25/2021    Procedure: COLONOSCOPY;  Surgeon: Elpidio Casillas Jr., MD;  Location: [facility];  Service: Endoscopy;  Laterality: N/A;    CORONARY ANGIOGRAPHY N/A 7/14/2021    Procedure: ANGIOGRAM, CORONARY ARTERY;  Surgeon: Corey Aguilar MD;  Location: [facility];  Service: Cardiovascular;  Laterality: N/A;    DIALYSIS FISTULA CREATION      EXCISION OF MEDIAL MENISCUS OF KNEE Right 7/26/2022    Procedure: MENISCECTOMY, KNEE, MEDIAL;  Surgeon: Tam Rosario MD;  Location: [facility];  Service: Orthopedics;  Laterality: Right;  partial     FOOT SURGERY  03/2019    FRACTURE SURGERY      KIDNEY STONE SURGERY  1983    KIDNEY TRANSPLANT Right 10/2021    KNEE  ARTHROSCOPY Right 07/26/2022    right knee scope    LAPAROSCOPIC REVISION OF PROCEDURE INVOLVING PERITONEAL DIALYSIS CATHETER N/A 9/17/2018    Procedure: REVISION OF PROCEDURE INVOLVING PERITONEAL DIALYSIS CATHETER, LAPAROSCOPIC;  Surgeon: Kannan Piedra MD;  Location: [facility] OR;  Service: General;  Laterality: N/A;    LEFT HEART CATHETERIZATION Left 7/14/2021    Procedure: Left heart cath;  Surgeon: Corey Aguilar MD;  Location: [facility] CATH;  Service: Cardiovascular;  Laterality: Left;    NEUROMA SURGERY Left     knee    OPEN REDUCTION AND INTERNAL FIXATION (ORIF) OF FRACTURE OF DISTAL RADIUS Left 8/3/2020    Procedure: ORIF, FRACTURE,/ BONE GRAFT/ ORIF Scaphoid;  Surgeon: MIKO Fishman MD;  Location: [facility];  Service: Orthopedics;  Laterality: Left;  Dr. Fishman completed his surgery at 1159    OTHER SURGICAL HISTORY      peritoneal catheter move X2    PERITONEAL CATHETER REMOVAL N/A 11/23/2019    Procedure: REMOVAL, CATHETER, DIALYSIS, PERITONEAL;  Surgeon: Edilson Sherman MD;  Location: [facility];  Service: General;  Laterality: N/A;    PLACEMENT PERITONEAL DIALYSIS CATHETER  2011     Review of patient's allergies indicates:   Allergen Reactions    Morphine      Other reaction(s): rash, vomiting     Current Outpatient Medications on File Prior to Visit   Medication Sig Dispense Refill    albuterol (PROVENTIL/VENTOLIN HFA) 90 mcg/actuation inhaler Inhale 1-2 puffs into the lungs every 6 (six) hours as needed for Shortness of Breath. Rescue 18 g 0    alendronate (FOSAMAX) 70 MG tablet TAKE 1 TABLET (70 MG TOTAL) BY MOUTH EVERY 7 DAYS 12 tablet 3    aspirin (ECOTRIN) 81 MG EC tablet Take 81 mg by mouth once daily.      cinacalcet (SENSIPAR) 60 MG Tab Take 60 mg by mouth.      cyanocobalamin 1,000 mcg/mL injection INJECT 1 ML ONCE DAILY FOR 7 DAYS, THEN 1 ML ONCE A WEEK FOR A MONTH, THEN ONCE MONTHLY 10 mL 3    multivit with minerals/lutein (MULTIVITAMIN 50 PLUS ORAL) ONCE DAILY      mycophenolate sodium 360  "MG TbEC 60      ondansetron (ZOFRAN-ODT) 4 MG TbDL DISSOLVE ONE TABLET BY MOUTH EVERY 8 HOURS AS NEEDED FOR NAUSEA 30 tablet 2    pantoprazole (PROTONIX) 40 MG tablet TAKE 1 TABLET BY MOUTH 2 TIMES DAILY 60 tablet 11    syringe with needle 3 mL 23 gauge x 1 1/2" Syrg 1 each by Misc.(Non-Drug; Combo Route) route once daily at 6am. Use to administer B-12 injections. 30 each 11    tacrolimus (PROGRAF) 1 MG Cap 7 capsules daily (3 am; 4 pm)      varenicline (CHANTIX) 1 mg Tab TAKE 1 TABLET BY MOUTH TWICE A DAY 60 tablet 2     Current Facility-Administered Medications on File Prior to Visit   Medication Dose Route Frequency Provider Last Rate Last Admin    0.9%  NaCl infusion  20 mL/hr Intravenous Continuous Yeimy Hickman MD 20 mL/hr at 18 0900 20 mL/hr at 18 0900    0.9%  NaCl infusion   Intravenous Continuous Albin Javier MD   Stopped at 20 1242    lidocaine (PF) 10 mg/ml (1%) injection 2 mg  0.2 mL Intradermal Once Albin Javier MD        sodium chloride 0.9% flush 10 mL  10 mL Intravenous PRN Faizan Mo MD         Family History   Problem Relation Name Age of Onset    Mental illness Brother      Heart disease Maternal Grandmother      Cancer Maternal Grandfather      Stroke Paternal Grandmother      Cancer Paternal Grandfather      No Known Problems Brother      Arthritis Mother      Hypertension Father      Suicide Father         Medications Ordered                [id number] - [place], [place] - [address] [address]   [address] [address]    Telephone: [phone]   Fax: [phone]   Hours: Not open 24 hours                         E-Prescribed (2 of 2)              cetirizine (ZYRTEC) 5 MG chewable tablet    Sig: Take 2 tablets (10 mg total) by mouth once daily. for 10 days       Start: 6/15/24     Quantity: 20 tablet Refills: 0                         fluticasone propionate (FLONASE) 50 mcg/actuation nasal spray    Si spray (50 mcg " total) by Each Nostril route once daily.       Start: 6/15/24     Quantity: 18.2 mL Refills: 0                           Ohs Peq Odvv Intake    6/15/2024 11:39 AM CDT - Filed by Patient   What is your current physical address in the event of a medical emergency?    Are you able to take your vital signs? Yes   Systolic Blood Pressure: 167   Diastolic Blood Pressure: 79   Weight: 115   Height: 66   Pulse: 75   Temperature: 97.9   Respiration rate:    Pulse Oxygen: 86   Please attach any relevant images or files          59 yo female with c/o uri infection last week and was on zpack. She states she has left sided facial pain. She states sinus pressure and congestion. She states symptoms seem worse in the sinus. She states she was seen in urgent. She denies fever. Denies sore throat.         Constitution: Negative.   HENT:  Positive for congestion.    Cardiovascular: Negative.    Respiratory: Negative.     Gastrointestinal: Negative.    Endocrine: negative.   Genitourinary: Negative.  Negative for frequency and urgency.   Musculoskeletal: Negative.    Skin: Negative.    Allergic/Immunologic: Negative.    Neurological: Negative.    Hematologic/Lymphatic: Negative.    Psychiatric/Behavioral: Negative.          Objective:   The physical exam was conducted virtually.  LOCATION OF PATIENT home  Physical Exam   Constitutional: She is oriented to person, place, and time. She appears well-developed.   HENT:   Head: Normocephalic and atraumatic.   Ears:   Right Ear: Hearing, tympanic membrane and external ear normal.   Left Ear: Hearing, tympanic membrane and external ear normal.   Nose: Nose normal.   Mouth/Throat: Uvula is midline, oropharynx is clear and moist and mucous membranes are normal.   Eyes: Conjunctivae and EOM are normal. Pupils are equal, round, and reactive to light.   Neck: Neck supple.   Cardiovascular: Normal rate.   Pulmonary/Chest: Effort normal and breath sounds normal.   Musculoskeletal: Normal range of  motion.         General: Normal range of motion.   Neurological: She is alert and oriented to person, place, and time.   Skin: Skin is warm.   Psychiatric: Her behavior is normal. Thought content normal.   Nursing note and vitals reviewed.      Assessment:     1. Sinus congestion        Plan:     -Below are suggestions for symptomatic relief:              -Tylenol every 4 hours OR ibuprofen every 6 hours as needed for pain/fever.              -Salt water gargles to soothe throat pain.              -Chloroseptic spray also helps to numb throat pain.              -Nasal saline spray reduces inflammation and dryness.              -Warm face compresses to help with facial sinus pain/pressure.              -Vicks vapor rub at night.              -Flonase OTC or Nasacort OTC for nasal congestion.              -Simple foods like chicken noodle soup.              -Delsym helps with coughing at night              -Zyrtec/Claritin during the day & Benadryl at night may help with allergies.     If you DO NOT have Hypertension or any history of palpitations, it is ok to take over the counter Sudafed or Mucinex D or Allegra-D or Claritin-D or Zyrtec-D.  If you do take one of the above, it is ok to combine that with plain over the counter Mucinex or Allegra or Claritin or Zyrtec. If, for example, you are taking Zyrtec -D, you can combine that with Mucinex, but not Mucinex-D.  If you are taking Mucinex-D, you can combine that with plain Allegra or Claritin or Zyrtec.   If you DO have Hypertension or palpitations, it is safe to take Coricidin HBP for relief of sinus symptoms.     Please follow up with your Primary care provider within 2-5 days if your signs and symptoms have not resolved or worsen.      If your condition worsens or fails to improve we recommend that you receive another evaluation at the emergency room immediately or contact your primary medical clinic to discuss your concerns.   You must understand that you have  received an Urgent Care treatment only and that you may be released before all of your medical problems are known or treated. You, the patient, will arrange for follow up care as instructed.      RED FLAGS/WARNING SYMPTOMS DISCUSSED WITH PATIENT THAT WOULD WARRANT EMERGENT MEDICAL ATTENTION. PATIENT VERBALIZED UNDERSTANDING.     Sinus congestion  -     cetirizine (ZYRTEC) 5 MG chewable tablet; Take 2 tablets (10 mg total) by mouth once daily. for 10 days  Dispense: 20 tablet; Refill: 0  -     fluticasone propionate (FLONASE) 50 mcg/actuation nasal spray; 1 spray (50 mcg total) by Each Nostril route once daily.  Dispense: 18.2 mL; Refill: 0

## 2024-06-19 PROBLEM — E87.5 HYPERKALEMIA: Status: ACTIVE | Noted: 2024-06-19

## 2024-06-19 PROBLEM — E87.20 METABOLIC ACIDOSIS: Status: ACTIVE | Noted: 2024-06-19

## 2024-06-19 PROBLEM — N17.9 ACUTE KIDNEY INJURY SUPERIMPOSED ON CHRONIC KIDNEY DISEASE: Status: ACTIVE | Noted: 2024-06-19

## 2024-06-19 PROBLEM — N18.9 ACUTE KIDNEY INJURY SUPERIMPOSED ON CHRONIC KIDNEY DISEASE: Status: ACTIVE | Noted: 2024-06-19

## 2024-06-19 PROBLEM — I50.33 ACUTE ON CHRONIC DIASTOLIC CHF (CONGESTIVE HEART FAILURE): Status: ACTIVE | Noted: 2024-06-19

## 2024-06-19 PROBLEM — E83.42 HYPOMAGNESEMIA: Status: ACTIVE | Noted: 2024-06-19

## 2024-06-19 PROBLEM — Z94.0 STATUS POST KIDNEY TRANSPLANT: Status: ACTIVE | Noted: 2024-06-19

## 2024-06-19 PROBLEM — I50.32 CHRONIC DIASTOLIC CHF (CONGESTIVE HEART FAILURE): Status: ACTIVE | Noted: 2024-06-19

## 2024-06-20 PROBLEM — E87.70 HYPERVOLEMIA ASSOCIATED WITH RENAL INSUFFICIENCY: Status: ACTIVE | Noted: 2024-06-19

## 2024-06-20 PROBLEM — E46 MALNUTRITION: Status: RESOLVED | Noted: 2024-06-20 | Resolved: 2024-06-20

## 2024-06-20 PROBLEM — N28.9 HYPERVOLEMIA ASSOCIATED WITH RENAL INSUFFICIENCY: Status: ACTIVE | Noted: 2024-06-19

## 2024-06-20 PROBLEM — E46 MALNUTRITION: Status: ACTIVE | Noted: 2024-06-20

## 2024-07-19 ENCOUNTER — OFFICE VISIT (OUTPATIENT)
Dept: PSYCHIATRY | Facility: CLINIC | Age: 59
End: 2024-07-19
Payer: MEDICARE

## 2024-07-19 DIAGNOSIS — F32.0 CURRENT MILD EPISODE OF MAJOR DEPRESSIVE DISORDER, UNSPECIFIED WHETHER RECURRENT: Primary | ICD-10-CM

## 2024-07-19 DIAGNOSIS — F41.1 GENERALIZED ANXIETY DISORDER: ICD-10-CM

## 2024-07-19 PROCEDURE — 3044F HG A1C LEVEL LT 7.0%: CPT | Mod: CPTII,S$GLB,,

## 2024-07-19 PROCEDURE — 99999 PR PBB SHADOW E&M-EST. PATIENT-LVL I: CPT | Mod: PBBFAC,,,

## 2024-07-19 PROCEDURE — 90791 PSYCH DIAGNOSTIC EVALUATION: CPT | Mod: S$GLB,,,
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
## 2024-08-28 ENCOUNTER — PATIENT MESSAGE (OUTPATIENT)
Dept: NEUROLOGY | Facility: CLINIC | Age: 59
End: 2024-08-28
Payer: MEDICARE

## 2024-09-06 ENCOUNTER — OFFICE VISIT (OUTPATIENT)
Dept: NEUROLOGY | Facility: CLINIC | Age: 59
End: 2024-09-06
Payer: MEDICARE

## 2024-09-06 DIAGNOSIS — B02.29 POST HERPETIC NEURALGIA: Primary | ICD-10-CM

## 2024-09-06 NOTE — ASSESSMENT & PLAN NOTE
Burning, itching localized pain to the left breast area secondary to shingles virus  Pt has tried Pregablin 150 mg without aid  She was also tried on low dose Elavil with more aid but continues to have significant breakthrough pain.   Agree with taking increased dose of Elavil 50 mg.   - OK for Hydrocodone PRN if still needed  Also pt to try Capsaicin cream   If Elavil of no aid can try Gabapentin   Diagnosis discussed with patient at length.

## 2024-09-06 NOTE — TELEPHONE ENCOUNTER
Appt made for 12/14, pt confirmed.   Patient c/o anxiety, notified provider of medicines being reconciled.

## 2024-09-06 NOTE — PROGRESS NOTES
NEUROLOGY  Outpatient Follow Up    [facility]  [address] [facility], [place] [address]  [phone] (office) / [phone] (fax)    Patient Name:  Vee Navarro  :  1965  MR #:  [id number]  Acct #:  [id number]    Date of Neurology Visit: 2024  Name of Provider: CIRILO Hinds    Other Physicians:  Evelina Gunn MD (Primary Care Physician); No ref. provider found (Referring)      Chief Complaint: No chief complaint on file.        History of Present Illness (HPI):  Vee Navarro is a right handed 56 y.o. female with a PMHX of ESRD, HTN (controlled), Kidney transplant in Oct 2021.   Patient is here today for memory loss. She is solo at today's visit. She is concerned about short term memory. She prides on not ever being a forgetful person. She often misplaces items or forgets things that are told to her. She lives at home with her mother and 2 young boys.      Patient's highest level of education completed was [place] in college. 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 She is working part time. The onset of memory issues began about 1 year ago. She struggles more short term memory loss. She believes that she is more impatient now as she lives with her 84 y/o mother and 2 teenage boys. She endorses mild depression but doesn't believe she needs medications. She denies suicidal ideation. She denies hallucinations. She states her sleep is fair and now has to sleep on her side which is a change for her. She will often wake up in the night but is able to fall back asleep fine. She believes she does well with executive function. She is managing her finances and medications well and without issues. She denies issues with hygiene and able to perform ADLs without assistance. She reports word finding difficulty but denies comprehension issues. She is hard of hearing and has seen an audiologist.   She denies issues with object  "recognition. She denies urinary incontinence. Her last fall was bout 5-6 months. She is still driving and denies recent MVAs. She does report getting lost near her neighborhood stating she couldn't remember where she lived. She reports this immediately after dialysis and not feeling well that day.   She works part time for her business and helps with house work.          Interval Hx 11/16/2022:  Patient is here today for balance issues. She reports having had a problem with balance for many years. She has had frequent falls with the last one being 2 months ago. That fall involved tripping over uneven ground. She fractured 2 bones in her wrist. She does suffer osteoporosis and now being treated for it.  She is concerned about losing her balance when turning too fast or leaning over. She denies dizziness, spinning or associated nausea, LOC, vomiting, visual loss, focal weakness or diaphoresis. All recent neuro testing discussed with the patient.       Interval Hx 10/24/2023:  Patient presents today for memory follow up. She was last evaluated in April 2022. She is solo at today's appointment. She reports difficulty with simple memory. She recently completed a fast paced EMT course and reports struggling with it. She did pass the course but states she really struggles with it. She continues to work very sparingly but keeps herself busy with other tasks. There are no significant behavioral changes. She continues to report impatience. She has been trying to find a therapist for anger issues. Her depression is intermittent. She denies hallucinations. She states her sleep is "horrible" and attributes this to her Tacrolimus. This has worsened since last seen. She reports nightmares. She is managing her finances and medications well and without issues. She denies issues with hygiene and able to perform ADLs without assistance. She notices when taking her recent course that she had difficulty spelling. She denies urinary " incontinence. Her last fall was 6 months ago. She is still driving and denies recent MVAs or getting lost in familiar areas.       Interval Hx 9/6/2024:   Patient is being seen virtually today for a new problem. She developed shingles about 1 month ago. She developed a rash to left breast. She saw urgent care and was given acyclovir and prednisone. The vesicles healed nicely. She then began having burning pain to that area. She would feel the need to itch and then that area would burn. Her PCP did try her on Lyrica 150 mg QHS. She took this for 5 days but it did not offer any aid. She was taken off the Lyrica and then started on Elavil 20 mg QHS. She was on this dose for 5 days. It worked better than the Lyrica but intermittently. She finds herself waking up with pain on this dose. Her PCP did increase her dose to 50 mg but she has not yet tried it. She is also taking Hydrocodone as needed for breakthrough pain.   She has tried Lidocaine cream but it did not offer aid.             Past Medical, Surgical, Family & Social History:   Reviewed and updated.    Home Medications:     Current Outpatient Medications:     alendronate (FOSAMAX) 70 MG tablet, TAKE 1 TABLET (70 MG TOTAL) BY MOUTH EVERY 7 DAYS (Patient taking differently: Take 70 mg by mouth every Sunday.), Disp: 12 tablet, Rfl: 3    amitriptyline (ELAVIL) 50 MG tablet, Take 1 tablet (50 mg total) by mouth every evening., Disp: 30 tablet, Rfl: 11    aspirin (ECOTRIN) 81 MG EC tablet, Take 81 mg by mouth once daily., Disp: , Rfl:     cinacalcet (SENSIPAR) 60 MG Tab, Take 60 mg by mouth daily with breakfast., Disp: , Rfl:     EScitalopram oxalate (LEXAPRO) 10 MG tablet, Take 1 tablet (10 mg total) by mouth once daily., Disp: 90 tablet, Rfl: 3    HYDROcodone-acetaminophen (NORCO)  mg per tablet, Take 1 tablet by mouth every 6 (six) hours as needed for Pain., Disp: 28 tablet, Rfl: 0    LIDOcaine (XYLOCAINE) 5 % Oint ointment, Apply topically 3 (three) times  daily as needed., Disp: 50 g, Rfl: 0    multivit with minerals/lutein (MULTIVITAMIN 50 PLUS ORAL), Take 1 tablet by mouth once daily., Disp: , Rfl:     mycophenolate sodium 360 MG TbEC, Take 360 mg by mouth 2 (two) times daily., Disp: , Rfl:     ondansetron (ZOFRAN-ODT) 4 MG TbDL, DISSOLVE ONE TABLET BY MOUTH EVERY 8 HOURS AS NEEDED FOR NAUSEA (Patient taking differently: Take 4 mg by mouth every 8 (eight) hours as needed (nausea).), Disp: 30 tablet, Rfl: 2    pantoprazole (PROTONIX) 40 MG tablet, TAKE 1 TABLET BY MOUTH 2 TIMES DAILY (Patient taking differently: Take 40 mg by mouth every evening.), Disp: 60 tablet, Rfl: 11    pregabalin (LYRICA) 75 MG capsule, Take 2 capsules (150 mg total) by mouth every evening., Disp: , Rfl:     tacrolimus (PROGRAF) 1 MG Cap, Take 3 mg by mouth 3 (three) times daily. One am, 2 pm, Disp: , Rfl:     triamcinolone acetonide 0.1% (KENALOG) 0.1 % cream, Apply topically 2 (two) times daily., Disp: 45 g, Rfl: 1    valACYclovir (VALTREX) 1000 MG tablet, Take 1 tablet (1,000 mg total) by mouth 3 (three) times daily. for 7 days (Patient not taking: Reported on 8/22/2024), Disp: 21 tablet, Rfl: 0  No current facility-administered medications for this visit.    Facility-Administered Medications Ordered in Other Visits:     0.9%  NaCl infusion, 20 mL/hr, Intravenous, Continuous, Yeimy Hickman MD, Last Rate: 20 mL/hr at 09/17/18 0900, 20 mL/hr at 09/17/18 0900    0.9%  NaCl infusion, , Intravenous, Continuous, Albin Javier MD, Stopped at 08/03/20 1242    lidocaine (PF) 10 mg/ml (1%) injection 2 mg, 0.2 mL, Intradermal, Once, Albin Javier MD    Physical Examination:  LMP  (LMP Unknown)     GENERAL:  General appearance: Well, non-toxic appearing.  No apparent distress.  Neck: supple.  .     MENTAL STATUS:  Alertness, attention span & concentration: normal.  Language: normal.  Orientation to self, place & time:  normal.  Memory, recent & remote: normal.  Fund of knowledge:  normal.  MMSE:29/30 (2023)  29/30 (4/2022)     SPEECH:  Clear and fluent.  Follows complex commands.     CRANIAL NERVES:  Cranial Nerves II-XII were examined.  II - Visual fields: normal.  III, IV, VI: PERRL, EOMI, No ptosis, No nystagmus.  V - Facial sensation: normal.  VII - Face symmetry & mobility: symmetric  VIII - Hearing: normal.  IX, X - Palate: normal  XI - Shoulder shrug: normal.  XII - Tongue protrusion: midline     GROSS MOTOR:  Gait & station: mildly antaglic; poor tandem  Tone: normal.  Abnormal movements: none.  Finger-nose: normal.  Rapid alternating movements: normal.  Pronator drift: normal        MUSCLE STRENGTH:      RIGHT      LEFT   5 Biceps 5   5 Triceps 5   5 Forearm.Pr. 5           4+ Iliopsoas flex    4+   5 Hip Abduct 5   5 Hip Adduct 5   4+ Quads 4+   5 Hams 5   5 Dorsiflex 5   5 Plantar Flex 5              REFLEXES:     RIGHT Reflex    LEFT   2+ Biceps 2+   2+ Brachiorad. 2+           1+ Patellar 1+      SENSORY:  Light touch: Normal throughout.                  Diagnostic Data Reviewed:   Folate   Date Value Ref Range Status   04/07/2022 6.2 4.0 - 24.0 ng/mL Final     Vitamin B-12   Date Value Ref Range Status   03/11/2023 >1000 (H) 210 - 950 pg/mL Final     Comment:     Patients taking very high Biotin doses of >300 mcg/day may   cause a positive bias in this assay.       TSH   Date Value Ref Range Status   06/25/2024 1.600 0.400 - 4.000 uIU/mL Final     Comment:     Warning:  Heterophilic antibodies in serum or plasma of   certain individuals are known to cause interference with   immunoassays. These antibodies may be present in blood samples   from individuals regularly exposed to animal or who have been   treated with animal products.     Patients taking very high Biotin doses of >300 mcg/day may   cause a negative bias in this assay.       RPR   Date Value Ref Range Status   04/07/2022 Non-reactive Non-reactive Final          MRI Brain 2016:  Findings: The diffusion-weighted images  are negative for acute diffuse ischemia.  There does appear to be some T2 shine through artifact present for example periventricular, high left parietal.  No mass effect, layering hemorrhage or overt hydrocephalus is appreciated.  There is symmetric overall appearance of the sulcal, gyral margins.  There is symmetric overall appearance of the soft tissues and orbital structures.  Unremarkable overall appearance of the vascular flow voids.  The left vertebral artery appears developmentally dominant.  No significant paranasal sinus mucosal thickening or layering fluid levels are appreciated. Mild volume loss and chronic appearing periventricular white matter changes are appreciated. Correlate overall with the patient's clinical findings and laboratory values.  IMPRESSION:    No mass effect, layering hemorrhage or evidence of acute ischemia is appreciated.  No acute intracranial changes are appreciated.  There do appear to be some scattered chronic appearing periventricular white matter changes bilaterally which could be seen with processes including chronic gliosis, microvascular ischemia with the pattern less typical for demyelination appearance.       MRI brain 4/2022:  FINDINGS:  Intracranial contents:There is no acute or significant abnormality and no definite change in the appearance of the brain compared to the prior study.  There is only mild volume loss with very mild nonspecific periventricular and scattered subcortical white matter FLAIR and T2 hyperintense signal.  These mild white matter changes likely reflect sequelae of chronic small vessel disease.  There is a punctate focus of abnormal signal in the left paramedian findings which could represent a remote punctate lacunar infarction or focus of small vessel disease.  There is no hydrocephalus or midline shift.  There is no hemorrhage.  There are no regions of restricted diffusion to suggest acute infarction.  There is no hydrocephalus or midline shift.   There is no abnormal extra-axial fluid collection.  The basilar cisterns are open.  Flow voids indicating patency are present in the major vessels at the base of the brain.  The cerebellar tonsils are normal position.  Sellar structures are normal.   The orbits are grossly normal.     Extracranial contents, calvarium, soft tissues:There is mild scattered mucosal thickening in the paranasal sinuses.  The mastoid air cells are clear.  Baseline marrow signal is normal.     Impression:     1. There is no acute abnormality.  There is mild volume loss and nonspecific white matter change.  There is no hemorrhage, mass or acute infarction.                 Assessment and Plan:  Problem List Items Addressed This Visit          Neuro    Post herpetic neuralgia - Primary    Current Assessment & Plan     Burning, itching localized pain to the left breast area secondary to shingles virus  Pt has tried Pregablin 150 mg without aid  She was also tried on low dose Elavil with more aid but continues to have significant breakthrough pain.   Agree with taking increased dose of Elavil 50 mg.   - OK for Hydrocodone PRN if still needed  Also pt to try Capsaicin cream   If Elavil of no aid can try Gabapentin   Diagnosis discussed with patient at length.                     Important to note, also  has a past medical history of Acid reflux, Acute medial meniscus tear of right knee (04/2022), Anemia, Arthritis, Bacteremia, Chronic diastolic CHF (congestive heart failure), Dialysis patient, Encounter for blood transfusion, End stage kidney disease, [place] (hard of hearing), Hypertension, Lumbar radiculopathy, Peritonitis, Renal failure, Short-term memory loss, Thyroid disease, and Vitamin B12 deficiency.          The patient will return to clinic in 1 week     All questions were answered and patient is comfortable with the plan.         Thank you very much for the opportunity to assist in this patient's care.    If you have any questions or  concerns, please do not hesitate to contact me at any time.      Sincerely,     CIRILO Hinds  [facility] [place]       The patient location is: [place], [place]  The chief complaint leading to consultation is: shingles    Visit type: audiovisual    Face to Face time with patient: 32 minutes of total time spent on the encounter, which includes face to face time and non-face to face time preparing to see the patient (eg, review of tests), Obtaining and/or reviewing separately obtained history, Documenting clinical information in the electronic or other health record, Independently interpreting results (not separately reported) and communicating results to the patient/family/caregiver, or Care coordination (not separately reported).         Each patient to whom he or she provides medical services by telemedicine is:  (1) informed of the relationship between the physician and patient and the respective role of any other health care provider with respect to management of the patient; and (2) notified that he or she may decline to receive medical services by telemedicine and may withdraw from such care at any time.    Notes:

## 2024-09-23 PROBLEM — N17.9 ACUTE KIDNEY INJURY SUPERIMPOSED ON CHRONIC KIDNEY DISEASE: Status: RESOLVED | Noted: 2024-06-19 | Resolved: 2024-09-23

## 2024-09-23 PROBLEM — N18.9 ACUTE KIDNEY INJURY SUPERIMPOSED ON CHRONIC KIDNEY DISEASE: Status: RESOLVED | Noted: 2024-06-19 | Resolved: 2024-09-23

## 2024-09-30 ENCOUNTER — OFFICE VISIT (OUTPATIENT)
Dept: OTOLARYNGOLOGY | Facility: CLINIC | Age: 59
End: 2024-09-30
Payer: MEDICARE

## 2024-09-30 VITALS — BODY MASS INDEX: 20.32 KG/M2 | WEIGHT: 122.13 LBS

## 2024-09-30 DIAGNOSIS — Z72.0 TOBACCO ABUSE: ICD-10-CM

## 2024-09-30 DIAGNOSIS — R13.10 DYSPHAGIA, UNSPECIFIED TYPE: Primary | ICD-10-CM

## 2024-09-30 DIAGNOSIS — Z94.0 STATUS POST KIDNEY TRANSPLANT: ICD-10-CM

## 2024-09-30 DIAGNOSIS — H61.23 BILATERAL IMPACTED CERUMEN: ICD-10-CM

## 2024-09-30 PROCEDURE — 31575 DIAGNOSTIC LARYNGOSCOPY: CPT | Mod: S$GLB,,, | Performed by: NURSE PRACTITIONER

## 2024-09-30 PROCEDURE — 1159F MED LIST DOCD IN RCRD: CPT | Mod: CPTII,S$GLB,, | Performed by: NURSE PRACTITIONER

## 2024-09-30 PROCEDURE — 1160F RVW MEDS BY RX/DR IN RCRD: CPT | Mod: CPTII,S$GLB,, | Performed by: NURSE PRACTITIONER

## 2024-09-30 PROCEDURE — 69210 REMOVE IMPACTED EAR WAX UNI: CPT | Mod: 51,S$GLB,, | Performed by: NURSE PRACTITIONER

## 2024-09-30 PROCEDURE — 99214 OFFICE O/P EST MOD 30 MIN: CPT | Mod: 25,S$GLB,, | Performed by: NURSE PRACTITIONER

## 2024-09-30 PROCEDURE — 3044F HG A1C LEVEL LT 7.0%: CPT | Mod: CPTII,S$GLB,, | Performed by: NURSE PRACTITIONER

## 2024-09-30 PROCEDURE — 3008F BODY MASS INDEX DOCD: CPT | Mod: CPTII,S$GLB,, | Performed by: NURSE PRACTITIONER

## 2024-09-30 PROCEDURE — 99999 PR PBB SHADOW E&M-EST. PATIENT-LVL IV: CPT | Mod: PBBFAC,,, | Performed by: NURSE PRACTITIONER

## 2024-09-30 NOTE — PATIENT INSTRUCTIONS
"LARYNGOPHARYNGEAL REFLUX  (SILENT OR ATYPICAL REFLUX)  If you have any of the following symptoms you may have laryngopharyngeal reflux (LPR):  frequent sore throat, thick or too much mucus, chronic throat clearing, voice changes, "lump" sensation, chronic cough, especially cough that wake you up from sleep, chronic "postnasal drip" without the need to blow your nose.      LPR is also called extra esophageal reflux. This means that if you have reflux into the tissues above the esophagus (into the voicebox,) you may experience throat issues as above.      Many people with LPR do not have symptoms of heartburn. Compared to the esophagus, the voice box and the back of the throat are significantly more sensitive to the effects of acid and digestive enzymes on surrounding tissue. Acid passing quickly through the esophagus does not have a chance to irritate the area for too long.  However acid that pools in the throat or voice box can cause prolonged irritation resulting in the symptoms of LPR.     The symptoms of LPR can consist of a dry cough and the sensation of something being stuck in the throat.  Some people will complain of heartburn while others may have intermittent hoarseness or loss of voice.  Another major symptom of LPR is "postnasal drip."  Patients are often told symptoms are due to abnormal nasal drainage or sinus infection; however this is rarely the cause of chronic throat irritation. For post nasal drip to cause the complaints described, signs and symptoms of an active nasal infection should be present.      Treatments for LPR include: postural changes (sleeping or laying with an incline), weight reduction, diet modification, medication to reduce stomach acid and promote normal motility, and rarely: surgery to prevent reflux. Most patients will begin to notice some relief in her symptoms about 2-4 weeks after starting the medication; however it is generally recommended the medication should be continued " for at least 2 months. If the symptoms completely resolve, the medication can then be tapered.  Some people will remain symptom free while others may have relapses which required treatment again.     Things you may be able to do to prevent reflux.  1. Do not smoke.  Smoking will worsen reflux.    2. Avoid eating at least 2-3 hours prior to bedtime.  Try to avoid very large meals at night.    3. Weight loss.  For patient's with recent weight gain, shedding a few pounds is all that is required to improve reflux.    4. Avoid reflux triggers. These can worsen acid in the stomach or even cause the esophagus muscles to relax: caffeine, carbonated water or soft drinks, acidic foods (tomato, fruit), mints, alcoholic beverages, particularly at night, cheese, fried foods, spicy foods, eggs, and chocolate.    5. Sleep with the head of bed elevated at least 6 inches.  6. You may also take over-the-counter acid reducing medications.       Gaviscon can be considered as an added medication for reflux.  Gaviscon is made up of sodium alginate, a natural substance derived from seaweed.  This substance, when swallowed, forms a barrier or raft on the surface of the stomach contents to prevent reflux of stomach contents into the esophagus or throat.  It can be an effective way to manage heartburn or gastroesophageal reflux (GERD).  The benefit is also that it is minimally absorbed into the rest of the body, so you do not have to worry about side effects outside of the stomach.  This can be taken long-term without any issues as long as tolerated well. There are two types of Gaviscon available:  Gaviscon Original can be purchased through most pharmacies or online  Gaviscon Advance is twice as strong as original (without side effects), but can only be purchased online, most easily accessible through [employer]. I find the Advance works far better than the Original.   How to take:  Read the instructions to confirm dosing, but generally it is  10 ml taken 2-3 times daily, or 1 chewable tablet 2-3 times daily.     See a Gastro doctor (GI) for refractory symptoms and continued management.

## 2024-09-30 NOTE — PROGRESS NOTES
Subjective:       Patient ID: Vee Navarro is a 59 y.o. female.    Chief Complaint: Cerumen Impaction and Dysphagia    HPI   Patient last seen Jan 2023 for asymmetrical SNHL. Her mother has hearing loss. Denies otologic h/o surgery or trauma. No noise exposure. 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 Kidney transplant recipient. Tobacco abuse off and on, last use was approx one month ago. Patient returns today for chief concern of dysphagia. Used to occur with only certain foods like rice and small particles, but now occurring with everything. Worse in the past 3 weeks. Throat feels swollen/smaller, phlegm in throat.  Patient endorses current symptoms of frequent throat clearing, dysphagia, gets choked easily, and globus sensation. Patient denies significant allergic stigmata:  No itchy, red, watery eyes; no itchy, red, watery nose; no excessive sneezing or stuffiness. Patient denies s/s of acute bacterial sinusitis:  No mucopus from nose or throat, no facial swelling/pain, no dental pain, no diminished olfaction/taste, no headaches around the eyes, no sore throat or productive cough. Last EGD was approx 1.5 years ago by Dr. Casillas. Cannot take many reflux meds due to kidney transplant. Patient endorses severe dyspepsia that keeps her awake some nights.     Review of Systems   Constitutional: Negative.  Negative for fever.   HENT:  Positive for hearing loss, tinnitus and trouble swallowing. Negative for dental problem, facial swelling, postnasal drip, rhinorrhea, sinus pressure/congestion, sneezing, sore throat and voice change.         Frequent throat clearing   Gets choked easily   Trouble swallowing   Globus sensation   Eyes: Negative.    Respiratory:  Positive for choking. Negative for cough.    Cardiovascular: Negative.    Gastrointestinal: Negative.    Musculoskeletal: Negative.    Integumentary:  Negative.   Neurological: Negative.  Negative for headaches.   Hematological: Negative.    Psychiatric/Behavioral: Negative.            Objective:      Physical Exam  Vitals and nursing note reviewed.   Constitutional:       General: She is not in acute distress.     Appearance: She is well-developed. She is not ill-appearing or diaphoretic.   HENT:      Head: Normocephalic and atraumatic.      Right Ear: Hearing, tympanic membrane, ear canal and external ear normal. No middle ear effusion. Tympanic membrane is not erythematous.      Left Ear: Hearing, tympanic membrane, ear canal and external ear normal.  No middle ear effusion. Tympanic membrane is not erythematous.      Nose: Nose normal.   Eyes:      General: Lids are normal. No scleral icterus.        Right eye: No discharge.         Left eye: No discharge.   Neck:      Trachea: Trachea normal. No tracheal deviation.   Cardiovascular:      Rate and Rhythm: Normal rate.   Pulmonary:      Effort: Pulmonary effort is normal. No respiratory distress.      Breath sounds: No stridor. No wheezing.   Musculoskeletal:         General: Normal range of motion.      Cervical back: Normal range of motion and neck supple.   Skin:     General: Skin is warm and dry.      Coloration: Skin is not pale.   Neurological:      Mental Status: She is alert and oriented to person, place, and time.      Coordination: Coordination normal.      Gait: Gait normal.   Psychiatric:         Speech: Speech normal.         Behavior: Behavior normal. Behavior is cooperative.         Thought Content: Thought content normal.         Judgment: Judgment normal.      SEPARATE PROCEDURE IN OFFICE:   Procedure: Removal of impacted cerumen, bilateral   Pre Procedure Diagnosis: Cerumen Impaction   Post Procedure Diagnosis: Cerumen Impaction   Verbal informed consent in regards to risk of trauma to ear canal, ear drum or hearing, discomfort during procedure and/or inability to remove cerumen impaction in one session or unforeseen events or complications.   No anesthesia.     Procedure in detail:   Ear canal visualized bilateral with  appropriate size ear speculum utilizing Operating Head Binocular Otomicroscope   Utilizing the following:  Ring curet was used. The impacted cerumen of the ear canals was removed atraumatically. The TM and EAC were then inspected and found to be clear of wax. See description of TMs/EACs in PE above.   Complications: No   Condition: Improved/Good        Procedure: Flexible laryngoscopy    In order to fully examine the upper aerodigestive tract, including the larynx, in a patient with a hyperactive gag reflex, and suboptimal visualization with indirect mirror exam,  flexible endoscopy is required.   After explaining the procedure and obtaining verbal consent, a timeout was performed with the patient's participation according to the universal protocol. Both nasal cavities were anesthetized with 4% Xylocaine spray mixed with Lalit-Synephrine. The flexible laryngoscope  was inserted into the nasal cavity and advanced to visualize the nasal cavity, nasopharynx, the posterior oropharynx, hypopharynx, and the endolarynx with the  findings noted. The scope was removed and the procedure terminated. The patient tolerated this procedure well without apparent complication.     OVERALL FINDINGS  Nasopharynx - the torus is clear. There are no lesions of the posterior wall.   Oropharynx - no lesions of the tongue base. There is no obvious fullness or asymmetry.  Hypopharynx - there are no lesions of the pyriform sinuses or postcricoid region   Larynx - there are no lesions of the supraglottic or glottic larynx.  Vocal fold mobility is normal.     SPECIFIC FINDINGS  Adenoid tissue - normal   Nasopharynx & eustachian tube orifices - normal   Posterior pharyngeal wall - normal   Base of tongue - normal   Epiglottis - normal   Valleculae - normal   Pyriform sinuses - normal   False vocal cords - normal   True vocal cords - normal  Arytenoids - normal   Interarytenoid space - erythema, edema  Right Base of Tongue    Left Base of  Tongue    Larynx    Larynx       Assessment:       Problem List Items Addressed This Visit       Status post kidney transplant     Other Visit Diagnoses       Dysphagia, unspecified type    -  Primary    Bilateral impacted cerumen        Tobacco abuse                Plan:       Advised/Cautioned: The results of today's ENT exam and flexible endoscopy were detailed to the patient and all questions were answered. Patient education centered around GERD, known exacerbants and contemporary treatment options. Laryngoscope photos were given and reviewed with the patient in detail. Handouts given on LPRD and GERD were given to the patient. I encouraged the patient once she has completed the evening meal to not snack or consume any other food products or caffeinated beverages for at least  minutes before retiring. Finally, I encouraged the patient to sleep about 30 degrees above horizontal, and this can be facilitated by using 2-3 pillows or a wedge foam product. If the patient is not demonstrably improved in 6-8 weeks, consultation with gastroenterology may be indicated to rule out intrinsic disease in the lower esophagus, stomach, or proximal duodenum. Discussed Gaviscon Advance BID may be the safest medication regimen given her kidney transplant status. Patient encouraged to return to clinic if symptoms worsen/persist and as needed for further ENT symptoms or concerns.

## 2024-10-24 ENCOUNTER — OFFICE VISIT (OUTPATIENT)
Dept: NEUROLOGY | Facility: CLINIC | Age: 59
End: 2024-10-24
Payer: MEDICARE

## 2024-10-24 VITALS
HEART RATE: 63 BPM | SYSTOLIC BLOOD PRESSURE: 140 MMHG | BODY MASS INDEX: 20.03 KG/M2 | WEIGHT: 120.38 LBS | DIASTOLIC BLOOD PRESSURE: 68 MMHG | RESPIRATION RATE: 16 BRPM

## 2024-10-24 DIAGNOSIS — R41.3 SHORT-TERM MEMORY LOSS: Primary | ICD-10-CM

## 2024-10-24 PROCEDURE — 99999 PR PBB SHADOW E&M-EST. PATIENT-LVL IV: CPT | Mod: PBBFAC,,, | Performed by: NURSE PRACTITIONER

## 2024-10-24 PROCEDURE — 99499 UNLISTED E&M SERVICE: CPT | Mod: S$GLB,,, | Performed by: NURSE PRACTITIONER

## 2024-10-24 PROCEDURE — 99483 ASSMT & CARE PLN PT COG IMP: CPT | Mod: S$GLB,,, | Performed by: NURSE PRACTITIONER

## 2024-10-24 NOTE — PROGRESS NOTES
NEUROLOGY  Outpatient Follow Up    [facility]  [address] [facility], [place] [address]  [phone] (office) / [phone] (fax)    Patient Name:  Vee Navarro  :  1965  MR #:  [id number]  Acct #:  [id number]    Date of Neurology Visit: 10/25/2024  Name of Provider: CIRILO Hinds    Other Physicians:  Evelina Gunn MD (Primary Care Physician); No ref. provider found (Referring)      Chief Complaint: Memory Loss        History of Present Illness (HPI):  Vee Navarro is a right handed 56 y.o. female with a PMHX of ESRD, HTN (controlled), Kidney transplant in Oct 2021.   Patient is here today for memory loss. She is solo at today's visit. She is concerned about short term memory. She prides on not ever being a forgetful person. She often misplaces items or forgets things that are told to her. She lives at home with her mother and 2 young boys.      Patient's highest level of education completed was clinton in college. 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 She is working part time. The onset of memory issues began about 1 year ago. She struggles more short term memory loss. She believes that she is more impatient now as she lives with her 86 y/o mother and 2 teenage boys. She endorses mild depression but doesn't believe she needs medications. She denies suicidal ideation. She denies hallucinations. She states her sleep is fair and now has to sleep on her side which is a change for her. She will often wake up in the night but is able to fall back asleep fine. She believes she does well with executive function. She is managing her finances and medications well and without issues. She denies issues with hygiene and able to perform ADLs without assistance. She reports word finding difficulty but denies comprehension issues. She is hard of hearing and has seen an audiologist.   She denies issues with object recognition. She  "denies urinary incontinence. Her last fall was bout 5-6 months. She is still driving and denies recent MVAs. She does report getting lost near her neighborhood stating she couldn't remember where she lived. She reports this immediately after dialysis and not feeling well that day.   She works part time for her business and helps with house work.          Interval Hx 11/16/2022:  Patient is here today for balance issues. She reports having had a problem with balance for many years. She has had frequent falls with the last one being 2 months ago. That fall involved tripping over uneven ground. She fractured 2 bones in her wrist. She does suffer osteoporosis and now being treated for it.  She is concerned about losing her balance when turning too fast or leaning over. She denies dizziness, spinning or associated nausea, LOC, vomiting, visual loss, focal weakness or diaphoresis. All recent neuro testing discussed with the patient.       Interval Hx 10/24/2023:  Patient presents today for memory follow up. She was last evaluated in April 2022. She is solo at today's appointment. She reports difficulty with simple memory. She recently completed a fast paced EMT course and reports struggling with it. She did pass the course but states she really struggles with it. She continues to work very sparingly but keeps herself busy with other tasks. There are no significant behavioral changes. She continues to report impatience. She has been trying to find a therapist for anger issues. Her depression is intermittent. She denies hallucinations. She states her sleep is "horrible" and attributes this to her Tacrolimus. This has worsened since last seen. She reports nightmares. She is managing her finances and medications well and without issues. She denies issues with hygiene and able to perform ADLs without assistance. She notices when taking her recent course that she had difficulty spelling. She denies urinary incontinence. Her " "last fall was 6 months ago. She is still driving and denies recent MVAs or getting lost in familiar areas.       Interval Hx 9/6/2024:   Patient is being seen virtually today for a new problem. She developed shingles about 1 month ago. She developed a rash to left breast. She saw urgent care and was given acyclovir and prednisone. The vesicles healed nicely. She then began having burning pain to that area. She would feel the need to itch and then that area would burn. Her PCP did try her on Lyrica 150 mg QHS. She took this for 5 days but it did not offer any aid. She was taken off the Lyrica and then started on Elavil 20 mg QHS. She was on this dose for 5 days. It worked better than the Lyrica but intermittently. She finds herself waking up with pain on this dose. Her PCP did increase her dose to 50 mg but she has not yet tried it. She is also taking Hydrocodone as needed for breakthrough pain.   She has tried Lidocaine cream but it did not offer aid.     Interval Hx 10/24/2024:  Patient presents today for memory follow up. She is solo today. She believes her memory has gotten much worse and this scares her. She may forget water boiling on the stove or the water faucet on, leaving her dog in the kennel.   There are no significant behavioral changes. She doesn't believe she is depressed but lacks motivation. She does take Lexapro for anxiety but unsure if it offers aid. She did talk to a therapist once for anger issues but feels like this can be controlled more now.   She denies hallucinations. Her sleep is "horrible". It has worsen since having the shingles. She was told she had mild SULAIMAN but could not tolerate the CPAP. She has never taken anything for insomnia. She was taking Elavil for shingles but stopped. She can fall asleep well but cannot stay asleep.  She is managing her finances and medications well and without issues. She denies issues with hygiene and able to perform ADLs without assistance. He denies " recent falls. She is still driving and denies recent MVAs or getting lost in familiar areas.               Past Medical, Surgical, Family & Social History:   Reviewed and updated.    Home Medications:     Current Outpatient Medications:     aspirin (ECOTRIN) 81 MG EC tablet, Take 81 mg by mouth once daily., Disp: , Rfl:     cinacalcet (SENSIPAR) 60 MG Tab, Take 60 mg by mouth daily with breakfast., Disp: , Rfl:     EScitalopram oxalate (LEXAPRO) 10 MG tablet, Take 1 tablet (10 mg total) by mouth once daily., Disp: 90 tablet, Rfl: 3    HYDROcodone-acetaminophen (NORCO)  mg per tablet, Take 1 tablet by mouth every 6 (six) hours as needed for Pain., Disp: 28 tablet, Rfl: 0    LIDOcaine (XYLOCAINE) 5 % Oint ointment, Apply topically 3 (three) times daily as needed., Disp: 50 g, Rfl: 0    multivit with minerals/lutein (MULTIVITAMIN 50 PLUS ORAL), Take 1 tablet by mouth once daily., Disp: , Rfl:     mycophenolate sodium 360 MG TbEC, Take 360 mg by mouth 2 (two) times daily., Disp: , Rfl:     ondansetron (ZOFRAN-ODT) 4 MG TbDL, DISSOLVE ONE TABLET BY MOUTH EVERY 8 HOURS AS NEEDED FOR NAUSEA (Patient taking differently: Take 4 mg by mouth every 8 (eight) hours as needed (nausea).), Disp: 30 tablet, Rfl: 2    pantoprazole (PROTONIX) 40 MG tablet, TAKE 1 TABLET BY MOUTH 2 TIMES DAILY (Patient taking differently: Take 40 mg by mouth every evening.), Disp: 60 tablet, Rfl: 11    pregabalin (LYRICA) 75 MG capsule, Take 2 capsules (150 mg total) by mouth every evening., Disp: , Rfl:     tacrolimus (PROGRAF) 1 MG Cap, Take 3 mg by mouth 3 (three) times daily. One am, 2 pm, Disp: , Rfl:     triamcinolone acetonide 0.1% (KENALOG) 0.1 % cream, Apply topically 2 (two) times daily., Disp: 45 g, Rfl: 1    valACYclovir (VALTREX) 1000 MG tablet, Take 1 tablet (1,000 mg total) by mouth 3 (three) times daily. for 7 days (Patient not taking: Reported on 8/22/2024), Disp: 21 tablet, Rfl: 0  No current facility-administered medications  for this visit.    Facility-Administered Medications Ordered in Other Visits:     0.9%  NaCl infusion, 20 mL/hr, Intravenous, Continuous, Yeimy Hickman MD, Last Rate: 20 mL/hr at 09/17/18 0900, 20 mL/hr at 09/17/18 0900    0.9%  NaCl infusion, , Intravenous, Continuous, Albin Javier MD, Stopped at 08/03/20 1242    lidocaine (PF) 10 mg/ml (1%) injection 2 mg, 0.2 mL, Intradermal, Once, Albin Javier MD    Physical Examination:  BP (!) 140/68 (BP Location: Right arm, Patient Position: Sitting)   Pulse 63   Resp 16   Wt 54.6 kg (120 lb 5.9 oz)   LMP  (LMP Unknown)   BMI 20.03 kg/m²               GENERAL:  General appearance: Well, non-toxic appearing.  No apparent distress.  Neck: supple.  .     MENTAL STATUS:  Alertness, attention span & concentration: normal.  Language: normal.  Orientation to self, place & time:  normal.  Memory, recent & remote: normal.  Fund of knowledge: normal.  MMSE: 28/30  29/30 (2023)  29/30 (4/2022)     SPEECH:  Clear and fluent.  Follows complex commands.     CRANIAL NERVES:  Cranial Nerves II-XII were examined.  II - Visual fields: normal.  III, IV, VI: PERRL, EOMI, No ptosis, No nystagmus.  V - Facial sensation: normal.  VII - Face symmetry & mobility: symmetric  VIII - Hearing: normal.  IX, X - Palate: normal  XI - Shoulder shrug: normal.  XII - Tongue protrusion: midline     GROSS MOTOR:  Gait & station: mildly antaglic; poor tandem  Tone: normal.  Abnormal movements: none.  Finger-nose: normal.  Rapid alternating movements: normal.  Pronator drift: normal        MUSCLE STRENGTH:      RIGHT      LEFT   5 Biceps 5   5 Triceps 5   5 Forearm.Pr. 5           4+ Iliopsoas flex    4+   5 Hip Abduct 5   5 Hip Adduct 5   4+ Quads 4+   5 Hams 5   5 Dorsiflex 5   5 Plantar Flex 5              REFLEXES:     RIGHT Reflex    LEFT   2+ Biceps 2+   2+ Brachiorad. 2+           1+ Patellar 1+      SENSORY:  Light touch: Normal throughout.                  Diagnostic Data  Reviewed:   Folate   Date Value Ref Range Status   04/07/2022 6.2 4.0 - 24.0 ng/mL Final     Vitamin B-12   Date Value Ref Range Status   03/11/2023 >1000 (H) 210 - 950 pg/mL Final     Comment:     Patients taking very high Biotin doses of >300 mcg/day may   cause a positive bias in this assay.       TSH   Date Value Ref Range Status   06/25/2024 1.600 0.400 - 4.000 uIU/mL Final     Comment:     Warning:  Heterophilic antibodies in serum or plasma of   certain individuals are known to cause interference with   immunoassays. These antibodies may be present in blood samples   from individuals regularly exposed to animal or who have been   treated with animal products.     Patients taking very high Biotin doses of >300 mcg/day may   cause a negative bias in this assay.       RPR   Date Value Ref Range Status   04/07/2022 Non-reactive Non-reactive Final          MRI Brain 2016:  Findings: The diffusion-weighted images are negative for acute diffuse ischemia.  There does appear to be some T2 shine through artifact present for example periventricular, high left parietal.  No mass effect, layering hemorrhage or overt hydrocephalus is appreciated.  There is symmetric overall appearance of the sulcal, gyral margins.  There is symmetric overall appearance of the soft tissues and orbital structures.  Unremarkable overall appearance of the vascular flow voids.  The left vertebral artery appears developmentally dominant.  No significant paranasal sinus mucosal thickening or layering fluid levels are appreciated. Mild volume loss and chronic appearing periventricular white matter changes are appreciated. Correlate overall with the patient's clinical findings and laboratory values.  IMPRESSION:    No mass effect, layering hemorrhage or evidence of acute ischemia is appreciated.  No acute intracranial changes are appreciated.  There do appear to be some scattered chronic appearing periventricular white matter changes bilaterally  which could be seen with processes including chronic gliosis, microvascular ischemia with the pattern less typical for demyelination appearance.       MRI brain 4/2022:  FINDINGS:  Intracranial contents:There is no acute or significant abnormality and no definite change in the appearance of the brain compared to the prior study.  There is only mild volume loss with very mild nonspecific periventricular and scattered subcortical white matter FLAIR and T2 hyperintense signal.  These mild white matter changes likely reflect sequelae of chronic small vessel disease.  There is a punctate focus of abnormal signal in the left paramedian findings which could represent a remote punctate lacunar infarction or focus of small vessel disease.  There is no hydrocephalus or midline shift.  There is no hemorrhage.  There are no regions of restricted diffusion to suggest acute infarction.  There is no hydrocephalus or midline shift.  There is no abnormal extra-axial fluid collection.  The basilar cisterns are open.  Flow voids indicating patency are present in the major vessels at the base of the brain.  The cerebellar tonsils are normal position.  Sellar structures are normal.   The orbits are grossly normal.     Extracranial contents, calvarium, soft tissues:There is mild scattered mucosal thickening in the paranasal sinuses.  The mastoid air cells are clear.  Baseline marrow signal is normal.     Impression:     1. There is no acute abnormality.  There is mild volume loss and nonspecific white matter change.  There is no hemorrhage, mass or acute infarction.     HST 6/2024:  AHI 11.4          Assessment and Plan:      Problem List Items Addressed This Visit          Neuro    Short-term memory loss - Primary    Current Assessment & Plan     Patient is a 60 y/o female that presents for memory f/u. She reports ongoing STM loss  Onset ~ 2 years ago  There are no reports of hallucinations, behavioral changes, recent falls, urinary  incontinence. She continues to report sub optimal sleep. She denies depression but states she lacks motivation.   MMSE today 28/30; prev 29/30 1 yr ago   - suspect MCI   Vascular etiology?  Most recent MRI brain scan reported with lacunar infarct and small vessel changes    - cont ASA 81 mg for secondary stroke prevention   Serologies noted  AHI 11.4; pt could not tolerate CPAP  She is interested in a sleep aid and opted to discuss it with her PCP.    - trazodone or Remeron would be good options.   Discussed role vs expectation of cognitive enhancing medications at length.    - not warranted at this time and she is not interested in medication   Consider referral for NP testing in the future.    - pt will decide on this  Long discussion today on vascular risk factors and their impact on cognitive decline.   Discussed ways to promote brain stimulation.   There are no safety concerns.                  Important to note, also  has a past medical history of Acid reflux, Acute medial meniscus tear of right knee (04/2022), Anemia, Arthritis, Bacteremia, Chronic diastolic CHF (congestive heart failure), Dialysis patient, Encounter for blood transfusion, End stage kidney disease, [place] (hard of hearing), Hypertension, Lumbar radiculopathy, Peritonitis, Renal failure, Short-term memory loss, Thyroid disease, and Vitamin B12 deficiency.          The patient will return to clinic in 6-12 mos    All questions were answered and patient is comfortable with the plan.         Thank you very much for the opportunity to assist in this patient's care.    If you have any questions or concerns, please do not hesitate to contact me at any time.      Sincerely,     CIRILO Hinds  [facility] [place]

## 2024-10-24 NOTE — PROGRESS NOTES
Caregiver name: Pollo   Relationship to the patient: Nephew   Does the patient have a living will? No  Does the patient have a designated healthcare POA? No  Does the patient have a designated general POA? No    Have educational materials/resources been provided? No      Activities of Daily Living    Bathing: Independent  Dressing: Independent  Grooming: Independent  Mouth Care: Independent  Toileting: Independent  Transferring Bed/Chair: Independent  Walking: Independent  Climbing: Independent  Eating: Independent      Instrumental Activities of Daily Living    Shopping: Independent  Cooking: Independent  Managing Medications: Independent  Using the phone and looking up numbers: Independent  Doing Housework: Needs Help  Doing Laundry: Independent  Driving or using public transportation: Independent  Managing finances: Independent    Functional Assessment Staging  2   Complains of forgetting location of objects. Subjective work difficulties.             10/24/2024     1:44 PM 3/19/2024     9:28 AM 10/24/2023     1:07 PM 2/13/2023    10:57 AM 4/11/2019     7:57 AM 5/4/2017    11:00 AM 8/19/2016    10:00 AM   Depression Patient Health Questionnaire   Over the last two weeks how often have you been bothered by little interest or pleasure in doing things Not at all Not at all Several days Not at all Not at all Not at all Not at all   Over the last two weeks how often have you been bothered by feeling down, depressed or hopeless Not at all Not at all Not at all Not at all Not at all Not at all Not at all   PHQ-2 Total Score 0 0 1 0 0 0 0

## 2024-10-24 NOTE — ASSESSMENT & PLAN NOTE
Patient is a 60 y/o female that presents for memory f/u. She reports ongoing STM loss  Onset ~ 2 years ago  There are no reports of hallucinations, behavioral changes, recent falls, urinary incontinence. She continues to report sub optimal sleep. She denies depression but states she lacks motivation.   MMSE today 28/30; prev 29/30 1 yr ago   - suspect MCI   Vascular etiology?  Most recent MRI brain scan reported with lacunar infarct and small vessel changes    - cont ASA 81 mg for secondary stroke prevention   Serologies noted  AHI 11.4; pt could not tolerate CPAP  She is interested in a sleep aid and opted to discuss it with her PCP.    - trazodone or Remeron would be good options.   Discussed role vs expectation of cognitive enhancing medications at length.    - not warranted at this time and she is not interested in medication   Consider referral for NP testing in the future.    - pt will decide on this  Long discussion today on vascular risk factors and their impact on cognitive decline.   Discussed ways to promote brain stimulation.   There are no safety concerns.

## 2024-11-08 ENCOUNTER — PATIENT MESSAGE (OUTPATIENT)
Dept: PODIATRY | Facility: CLINIC | Age: 59
End: 2024-11-08
Payer: MEDICARE

## 2024-12-12 ENCOUNTER — TELEPHONE (OUTPATIENT)
Dept: CARDIOLOGY | Facility: CLINIC | Age: 59
End: 2024-12-12
Payer: MEDICARE

## 2024-12-12 ENCOUNTER — TELEPHONE (OUTPATIENT)
Dept: UROLOGY | Facility: CLINIC | Age: 59
End: 2024-12-12
Payer: MEDICARE

## 2024-12-12 ENCOUNTER — OFFICE VISIT (OUTPATIENT)
Dept: UROLOGY | Facility: CLINIC | Age: 59
End: 2024-12-12
Payer: MEDICARE

## 2024-12-12 VITALS — HEIGHT: 65 IN | BODY MASS INDEX: 20.2 KG/M2 | WEIGHT: 121.25 LBS

## 2024-12-12 DIAGNOSIS — R31.0 GROSS HEMATURIA: Primary | ICD-10-CM

## 2024-12-12 LAB
BACTERIA #/AREA URNS HPF: NORMAL /HPF
BILIRUBIN, UA POC OHS: NEGATIVE
BLOOD, UA POC OHS: ABNORMAL
CLARITY, UA POC OHS: CLEAR
COLOR, UA POC OHS: YELLOW
GLUCOSE, UA POC OHS: NEGATIVE
HYALINE CASTS #/AREA URNS LPF: 0 /LPF
KETONES, UA POC OHS: NEGATIVE
LEUKOCYTES, UA POC OHS: NEGATIVE
MICROSCOPIC COMMENT: NORMAL
NITRITE, UA POC OHS: NEGATIVE
PH, UA POC OHS: 5
PROTEIN, UA POC OHS: ABNORMAL
RBC #/AREA URNS HPF: 2 /HPF (ref 0–4)
SPECIFIC GRAVITY, UA POC OHS: 1.02
SQUAMOUS #/AREA URNS HPF: 0 /HPF
UROBILINOGEN, UA POC OHS: 0.2
WBC #/AREA URNS HPF: 1 /HPF (ref 0–5)

## 2024-12-12 PROCEDURE — 3044F HG A1C LEVEL LT 7.0%: CPT | Mod: CPTII,S$GLB,,

## 2024-12-12 PROCEDURE — 87086 URINE CULTURE/COLONY COUNT: CPT

## 2024-12-12 PROCEDURE — 1160F RVW MEDS BY RX/DR IN RCRD: CPT | Mod: CPTII,S$GLB,,

## 2024-12-12 PROCEDURE — 87088 URINE BACTERIA CULTURE: CPT

## 2024-12-12 PROCEDURE — 87186 SC STD MICRODIL/AGAR DIL: CPT

## 2024-12-12 PROCEDURE — 81000 URINALYSIS NONAUTO W/SCOPE: CPT | Mod: PO

## 2024-12-12 PROCEDURE — 1159F MED LIST DOCD IN RCRD: CPT | Mod: CPTII,S$GLB,,

## 2024-12-12 PROCEDURE — 81003 URINALYSIS AUTO W/O SCOPE: CPT | Mod: QW,S$GLB,,

## 2024-12-12 PROCEDURE — 99203 OFFICE O/P NEW LOW 30 MIN: CPT | Mod: S$GLB,,,

## 2024-12-12 PROCEDURE — 3008F BODY MASS INDEX DOCD: CPT | Mod: CPTII,S$GLB,,

## 2024-12-12 PROCEDURE — 99999 PR PBB SHADOW E&M-EST. PATIENT-LVL IV: CPT | Mod: PBBFAC,,,

## 2024-12-12 NOTE — PROGRESS NOTES
[facility] Note  Staff: Tiffany Rossi FNP-C    PCP: MD Luis A    Chief Complaint: Gross Hematuria    Subjective:        HPI: Vee Navarro is a 59 y.o. female NEW PATIENT presents today for evaluation of gross hematuria. She states this has been happening intermittently for about 1 month. She states she was seen in the ED and diagnosed with a UTI, although her urine culture was negative for infection. She also had a CT RSS done 11/22/2024 which showed There are changes consistent with polycystic kidney disease on the left. The right kidney is surgically absent. There is a transplanted kidney in the right-side of the pelvis showing mild hydronephrosis definite distal ureteral lithiasis is not demonstrated Mild hydronephrosis of the transplanted kidney in the right lower quadrant without a definite stone seen.     I reviewed all imaging with her today. She states she last saw blood in her urine about 1 week ago. She had a micro UA done 12/6/2024 which showed 52 RBCs per HPF. She denies dysuria, fever, flank pain, abd pain, and difficulty urinating.     She does have a history of right kidney transplant done in 2021 at . She does have a history of kidney stones.     Questions asked pt during office visit today:  Urgency:No, incontinence with urgency? No;   DysuriaNo  Gross HematuriaYes   History of UTI: No      Constipation issues?:  no    REVIEW OF SYSTEMS:  Review of Systems   Constitutional: Negative.  Negative for chills and fever.   Gastrointestinal: Negative.  Negative for abdominal pain, constipation, diarrhea, nausea and vomiting.   Genitourinary:  Positive for hematuria. Negative for dysuria, flank pain, frequency and urgency.   Musculoskeletal: Negative.  Negative for back pain.       PMHx:  Past Medical History:   Diagnosis Date    Acid reflux     Acute medial meniscus tear of right knee 04/2022    Anemia     Arthritis     Bacteremia     Chronic diastolic CHF (congestive heart  failure)     Dialysis patient     resolved w/ transplant    Encounter for blood transfusion     End stage kidney disease     [place] (hard of hearing)     Hypertension     Lumbar radiculopathy     Peritonitis     Renal failure     was on HD then PD then HD then kidney transplant in 2021 then ckd III    Short-term memory loss     Thyroid disease     Vitamin B12 deficiency        PSHx:  Past Surgical History:   Procedure Laterality Date    arm surgery Left     ARTHROSCOPY OF KNEE Right 7/26/2022    Procedure: ARTHROSCOPY, KNEE;  Surgeon: Tam Rosario MD;  Location: [facility];  Service: Orthopedics;  Laterality: Right;    BACK SURGERY  1982    CARPAL TUNNEL RELEASE Left 8/3/2020    Procedure: RELEASE, CARPAL TUNNEL;  Surgeon: MIKO Fishman MD;  Location: [facility];  Service: Orthopedics;  Laterality: Left;    COLONOSCOPY N/A 12/12/2017    Procedure: COLONOSCOPY;  Surgeon: Elpidio Casillas Jr., MD;  Location: [facility];  Service: Endoscopy;  Laterality: N/A;    COLONOSCOPY N/A 1/25/2021    Procedure: COLONOSCOPY;  Surgeon: Elpidio Casillas Jr., MD;  Location: [facility];  Service: Endoscopy;  Laterality: N/A;    CORONARY ANGIOGRAPHY N/A 7/14/2021    Procedure: ANGIOGRAM, CORONARY ARTERY;  Surgeon: Corey Aguilar MD;  Location: [facility] CATH;  Service: Cardiovascular;  Laterality: N/A;    DIALYSIS FISTULA CREATION      EXCISION OF MEDIAL MENISCUS OF KNEE Right 7/26/2022    Procedure: MENISCECTOMY, KNEE, MEDIAL;  Surgeon: Tam Rosario MD;  Location: [facility];  Service: Orthopedics;  Laterality: Right;  partial     FOOT SURGERY  03/2019    FRACTURE SURGERY      KIDNEY STONE SURGERY  1983    KIDNEY TRANSPLANT Right 10/2021    KNEE ARTHROSCOPY Right 07/26/2022    right knee scope    LAPAROSCOPIC REVISION OF PROCEDURE INVOLVING PERITONEAL DIALYSIS CATHETER N/A 9/17/2018    Procedure: REVISION OF PROCEDURE INVOLVING PERITONEAL DIALYSIS CATHETER, LAPAROSCOPIC;  Surgeon: Kannan Piedra MD;  Location: [facility];  Service:  General;  Laterality: N/A;    LEFT HEART CATHETERIZATION Left 7/14/2021    Procedure: Left heart cath;  Surgeon: Corey Aguilar MD;  Location: [facility] CATH;  Service: Cardiovascular;  Laterality: Left;    NEUROMA SURGERY Left     knee    OPEN REDUCTION AND INTERNAL FIXATION (ORIF) OF FRACTURE OF DISTAL RADIUS Left 8/3/2020    Procedure: ORIF, FRACTURE,/ BONE GRAFT/ ORIF Scaphoid;  Surgeon: MIKO Fishman MD;  Location: [facility];  Service: Orthopedics;  Laterality: Left;  Dr. Fishman completed his surgery at 1159    OTHER SURGICAL HISTORY      peritoneal catheter move X2    PERITONEAL CATHETER REMOVAL N/A 11/23/2019    Procedure: REMOVAL, CATHETER, DIALYSIS, PERITONEAL;  Surgeon: Edilson Sherman MD;  Location: [facility] OR;  Service: General;  Laterality: N/A;    PLACEMENT PERITONEAL DIALYSIS CATHETER  2011 Fam Hx:   malignancies: No , gyn malignancies: No   kidney stones: No     Soc Hx:  [place]    Allergies:  Morphine    Medications: reviewed     Objective:   There were no vitals filed for this visit.    Physical Exam  Constitutional:       Appearance: Normal appearance.   Pulmonary:      Effort: Pulmonary effort is normal.   Abdominal:      General: There is no distension.      Palpations: Abdomen is soft.      Tenderness: There is no abdominal tenderness. There is no right CVA tenderness or left CVA tenderness.   Musculoskeletal:         General: Normal range of motion.      Cervical back: Normal range of motion.   Skin:     General: Skin is warm.   Neurological:      Mental Status: She is oriented to person, place, and time.   Psychiatric:         Mood and Affect: Mood normal.         Behavior: Behavior normal.         Assessment:       1. Gross hematuria          Plan:      Urine sent for micro UA, culture, and cytology  CT Urogram ordered and scheduled pending transplant approval  Cystoscopy ordered and scheduled    F/u per treatment plan    [facility]: active    CIRILO Palafox

## 2024-12-12 NOTE — TELEPHONE ENCOUNTER
----- Message from Maria sent at 12/12/2024 10:55 AM CST -----  Contact: self  [phone]  Type:  Needs Medical Advice    Who Called: ELENO  Symptoms (please be specific): RESULTS  How long has patient had these symptoms:    Pharmacy name and phone #:    Would the patient rather a call back or a response via [facility]?   Best Call Back Number:  [phone]  Additional Information: Patient called in this morning stating she was told something may be wrong with her aorta and she would like a call back to discuss if she needs to be seen earlier than her scheduled appointment. She is very concerned. Please call back  [phone]. Thanks brant

## 2024-12-12 NOTE — TELEPHONE ENCOUNTER
Called pt regarding gross hematuria, pt received a call to set up an appt. Pt missed the call. I got pt set up for an appt today with NP. Pt verbalized understanding. QR----- Message from Amaury sent at 12/12/2024  9:46 AM CST -----  Regarding: Returning call  Type:  Patient Returning Call    Who Called:PT    Who Left Message for Patient: unk    Does the patient know what this is regarding?: appt    Would the patient rather a call back or a response via [facility]? Call back    Best Call Back Number:[phone]      Additional Information:   unk missed call.    Please advise -- Thank you

## 2024-12-13 LAB — BACTERIA UR CULT: ABNORMAL

## 2024-12-16 ENCOUNTER — PATIENT MESSAGE (OUTPATIENT)
Dept: UROLOGY | Facility: CLINIC | Age: 59
End: 2024-12-16
Payer: MEDICARE

## 2024-12-16 ENCOUNTER — TELEPHONE (OUTPATIENT)
Dept: UROLOGY | Facility: CLINIC | Age: 59
End: 2024-12-16
Payer: MEDICARE

## 2024-12-16 DIAGNOSIS — N30.01 ACUTE CYSTITIS WITH HEMATURIA: Primary | ICD-10-CM

## 2024-12-16 RX ORDER — CIPROFLOXACIN 500 MG/1
500 TABLET ORAL EVERY 12 HOURS
Qty: 10 TABLET | Refills: 0 | Status: SHIPPED | OUTPATIENT
Start: 2024-12-16 | End: 2024-12-21

## 2024-12-16 NOTE — TELEPHONE ENCOUNTER
Called pt to inform them of results, No answer LM      ----- Message from Tiffany Rossi NP sent at 12/16/2024  8:46 AM CST -----  Low count bacteria seen in urine- abx sent to pharmacy and continue with all scheduled work up

## 2024-12-17 ENCOUNTER — HOSPITAL ENCOUNTER (OUTPATIENT)
Dept: CARDIOLOGY | Facility: HOSPITAL | Age: 59
Discharge: HOME | End: 2024-12-17
Attending: INTERNAL MEDICINE
Payer: MEDICARE

## 2024-12-17 ENCOUNTER — OFFICE VISIT (OUTPATIENT)
Dept: CARDIOLOGY | Facility: CLINIC | Age: 59
End: 2024-12-17
Payer: MEDICARE

## 2024-12-17 VITALS — WEIGHT: 123 LBS | HEIGHT: 65 IN | BODY MASS INDEX: 20.49 KG/M2

## 2024-12-17 VITALS
BODY MASS INDEX: 20.54 KG/M2 | DIASTOLIC BLOOD PRESSURE: 50 MMHG | WEIGHT: 123.25 LBS | HEART RATE: 61 BPM | SYSTOLIC BLOOD PRESSURE: 92 MMHG | HEIGHT: 65 IN

## 2024-12-17 DIAGNOSIS — R06.02 SOB (SHORTNESS OF BREATH): ICD-10-CM

## 2024-12-17 DIAGNOSIS — I35.8 AORTIC VALVE SCLEROSIS: ICD-10-CM

## 2024-12-17 DIAGNOSIS — R06.02 SOB (SHORTNESS OF BREATH): Primary | ICD-10-CM

## 2024-12-17 DIAGNOSIS — I10 ESSENTIAL HYPERTENSION: ICD-10-CM

## 2024-12-17 DIAGNOSIS — R06.02 SHORTNESS OF BREATH: ICD-10-CM

## 2024-12-17 DIAGNOSIS — T82.590A MECHANICAL COMPLICATION OF ARTERIOVENOUS FISTULA SURGICALLY CREATED, INITIAL ENCOUNTER: ICD-10-CM

## 2024-12-17 DIAGNOSIS — N18.6 END STAGE RENAL DISEASE: ICD-10-CM

## 2024-12-17 DIAGNOSIS — Z87.891 FORMER SMOKER: ICD-10-CM

## 2024-12-17 LAB
ASCENDING AORTA: 2.93 CM
AV INDEX (PROSTH): 0.52
AV MEAN GRADIENT: 10.2 MMHG
AV PEAK GRADIENT: 21.2 MMHG
AV VALVE AREA BY VELOCITY RATIO: 1.5 CM²
AV VALVE AREA: 1.6 CM²
AV VELOCITY RATIO: 0.48
BSA FOR ECHO PROCEDURE: 1.6 M2
CV ECHO LV RWT: 0.43 CM
DOP CALC AO PEAK VEL: 2.3 M/S
DOP CALC AO VTI: 45.6 CM
DOP CALC LVOT AREA: 3.1 CM2
DOP CALC LVOT DIAMETER: 2 CM
DOP CALC LVOT PEAK VEL: 1.1 M/S
DOP CALC LVOT STROKE VOLUME: 75 CM3
DOP CALCLVOT PEAK VEL VTI: 23.9 CM
E WAVE DECELERATION TIME: 218.85 MSEC
E/A RATIO: 1.1
E/E' RATIO: 19.69 M/S
ECHO LV POSTERIOR WALL: 1 CM (ref 0.6–1.1)
FRACTIONAL SHORTENING: 29.8 % (ref 28–44)
INTERVENTRICULAR SEPTUM: 1 CM (ref 0.6–1.1)
IVRT: 76.12 MSEC
LEFT ATRIUM AREA SYSTOLIC (APICAL 2 CHAMBER): 20.93 CM2
LEFT ATRIUM AREA SYSTOLIC (APICAL 4 CHAMBER): 25.64 CM2
LEFT ATRIUM SIZE: 3.84 CM
LEFT ATRIUM VOLUME INDEX MOD: 49.1 ML/M2
LEFT ATRIUM VOLUME MOD: 79.01 ML
LEFT INTERNAL DIMENSION IN SYSTOLE: 3.3 CM (ref 2.1–4)
LEFT VENTRICLE DIASTOLIC VOLUME INDEX: 64.22 ML/M2
LEFT VENTRICLE DIASTOLIC VOLUME: 103.39 ML
LEFT VENTRICLE END SYSTOLIC VOLUME APICAL 2 CHAMBER: 64.95 ML
LEFT VENTRICLE END SYSTOLIC VOLUME APICAL 4 CHAMBER: 81.76 ML
LEFT VENTRICLE MASS INDEX: 102.1 G/M2
LEFT VENTRICLE SYSTOLIC VOLUME INDEX: 26.7 ML/M2
LEFT VENTRICLE SYSTOLIC VOLUME: 42.97 ML
LEFT VENTRICULAR INTERNAL DIMENSION IN DIASTOLE: 4.7 CM (ref 3.5–6)
LEFT VENTRICULAR MASS: 164.5 G
LV LATERAL E/E' RATIO: 21.33 M/S
LV SEPTAL E/E' RATIO: 18.29 M/S
LVED V (TEICH): 103.39 ML
LVES V (TEICH): 42.97 ML
LVOT MG: 2.32 MMHG
LVOT MV: 0.72 CM/S
MV PEAK A VEL: 1.16 M/S
MV PEAK E VEL: 1.28 M/S
OHS CV RV/LV RATIO: 0.98 CM
PISA TR MAX VEL: 2.96 M/S
PULM VEIN S/D RATIO: 0.94
PV PEAK D VEL: 0.47 M/S
PV PEAK S VEL: 0.44 M/S
RA PRESSURE ESTIMATED: 3 MMHG
RA VOL SYS: 71.93 ML
RIGHT ATRIAL AREA: 20.2 CM2
RIGHT ATRIUM VOLUME AREA LENGTH APICAL 4 CHAMBER: 68.63 ML
RIGHT VENTRICLE DIASTOLIC BASEL DIMENSION: 4.6 CM
RIGHT VENTRICLE DIASTOLIC LENGTH: 8.1 CM
RIGHT VENTRICLE DIASTOLIC MID DIMENSION: 2.9 CM
RIGHT VENTRICULAR END-DIASTOLIC DIMENSION: 4.58 CM
RIGHT VENTRICULAR LENGTH IN DIASTOLE (APICAL 4-CHAMBER VIEW): 8.07 CM
RV MID DIAMA: 2.85 CM
RV TB RVSP: 6 MMHG
RV TISSUE DOPPLER FREE WALL SYSTOLIC VELOCITY 1 (APICAL 4 CHAMBER VIEW): 13.98 CM/S
SINUS: 2.87 CM
STJ: 2.78 CM
TDI LATERAL: 0.06 M/S
TDI SEPTAL: 0.07 M/S
TDI: 0.07 M/S
TR MAX PG: 35 MMHG
TRICUSPID ANNULAR PLANE SYSTOLIC EXCURSION: 2.33 CM
TV REST PULMONARY ARTERY PRESSURE: 38 MMHG
Z-SCORE OF LEFT VENTRICULAR DIMENSION IN END DIASTOLE: 0.29
Z-SCORE OF LEFT VENTRICULAR DIMENSION IN END SYSTOLE: 1.21

## 2024-12-17 PROCEDURE — 93005 ELECTROCARDIOGRAM TRACING: CPT | Mod: PO

## 2024-12-17 PROCEDURE — 1159F MED LIST DOCD IN RCRD: CPT | Mod: CPTII,S$GLB,, | Performed by: INTERNAL MEDICINE

## 2024-12-17 PROCEDURE — 3074F SYST BP LT 130 MM HG: CPT | Mod: CPTII,S$GLB,, | Performed by: INTERNAL MEDICINE

## 2024-12-17 PROCEDURE — 93010 ELECTROCARDIOGRAM REPORT: CPT | Mod: S$GLB,,, | Performed by: INTERNAL MEDICINE

## 2024-12-17 PROCEDURE — 93306 TTE W/DOPPLER COMPLETE: CPT | Mod: PO

## 2024-12-17 PROCEDURE — 99999 PR PBB SHADOW E&M-EST. PATIENT-LVL IV: CPT | Mod: PBBFAC,,, | Performed by: INTERNAL MEDICINE

## 2024-12-17 PROCEDURE — 3008F BODY MASS INDEX DOCD: CPT | Mod: CPTII,S$GLB,, | Performed by: INTERNAL MEDICINE

## 2024-12-17 PROCEDURE — 93226 XTRNL ECG REC<48 HR SCAN A/R: CPT | Mod: PO

## 2024-12-17 PROCEDURE — 3078F DIAST BP <80 MM HG: CPT | Mod: CPTII,S$GLB,, | Performed by: INTERNAL MEDICINE

## 2024-12-17 PROCEDURE — 3044F HG A1C LEVEL LT 7.0%: CPT | Mod: CPTII,S$GLB,, | Performed by: INTERNAL MEDICINE

## 2024-12-17 PROCEDURE — 93306 TTE W/DOPPLER COMPLETE: CPT | Mod: 26,,, | Performed by: INTERNAL MEDICINE

## 2024-12-17 PROCEDURE — 99204 OFFICE O/P NEW MOD 45 MIN: CPT | Mod: 25,S$GLB,, | Performed by: INTERNAL MEDICINE

## 2024-12-17 NOTE — PROGRESS NOTES
Subjective:    Patient ID:  Vee Navarro is a 59 y.o. female patient here for evaluation Establish Care, Shortness of Breath, Palpitations, and Fatigue      History of Present Illness:  Patient cardiac evaluation.  Patient referred for abnormal echo, preserved EF, mild AS.  Followed by renal transplant team, status post post renal transplant about 3 years ago.  Symptomatic at present with palpitations.    ER visit last month with volume overload CHF treated with Lasix.  Transplant rejection medications changed.  Last visit infrarenal this week.  Creatinine 1.16 with a GFR 57 potassium elevated 11 days ago 5.6.    No angina dyspnea.  No PND orthopnea.  Pre transplant workup with left heart catheterization 2021, normal coronaries.  No syncope/presyncope.                 Review of patient's allergies indicates:   Allergen Reactions    Morphine      Other reaction(s): rash, vomiting       Past Medical History:   Diagnosis Date    Acid reflux     Acute medial meniscus tear of right knee 04/2022    Anemia     Arthritis     Bacteremia     Chronic diastolic CHF (congestive heart failure)     Dialysis patient     resolved w/ transplant    Encounter for blood transfusion     End stage kidney disease     [place] (hard of hearing)     Hypertension     Lumbar radiculopathy     Peritonitis     Renal failure     was on HD then PD then HD then kidney transplant in 2021 then ckd III    Short-term memory loss     Thyroid disease     Vitamin B12 deficiency      Past Surgical History:   Procedure Laterality Date    arm surgery Left     ARTHROSCOPY OF KNEE Right 7/26/2022    Procedure: ARTHROSCOPY, KNEE;  Surgeon: Tam Rosario MD;  Location: [facility];  Service: Orthopedics;  Laterality: Right;    BACK SURGERY  1982    CARPAL TUNNEL RELEASE Left 8/3/2020    Procedure: RELEASE, CARPAL TUNNEL;  Surgeon: MIKO Fishman MD;  Location: [facility];  Service: Orthopedics;  Laterality: Left;    COLONOSCOPY N/A 12/12/2017    Procedure:  COLONOSCOPY;  Surgeon: Elpidio Casillas Jr., MD;  Location: [facility];  Service: Endoscopy;  Laterality: N/A;    COLONOSCOPY N/A 1/25/2021    Procedure: COLONOSCOPY;  Surgeon: Elpidio Casillas Jr., MD;  Location: [facility];  Service: Endoscopy;  Laterality: N/A;    CORONARY ANGIOGRAPHY N/A 7/14/2021    Procedure: ANGIOGRAM, CORONARY ARTERY;  Surgeon: Corey Aguilar MD;  Location: [facility] CATH;  Service: Cardiovascular;  Laterality: N/A;    DIALYSIS FISTULA CREATION      EXCISION OF MEDIAL MENISCUS OF KNEE Right 7/26/2022    Procedure: MENISCECTOMY, KNEE, MEDIAL;  Surgeon: Tam Rosario MD;  Location: [facility];  Service: Orthopedics;  Laterality: Right;  partial     FOOT SURGERY  03/2019    FRACTURE SURGERY      KIDNEY STONE SURGERY  1983    KIDNEY TRANSPLANT Right 10/2021    KNEE ARTHROSCOPY Right 07/26/2022    right knee scope    LAPAROSCOPIC REVISION OF PROCEDURE INVOLVING PERITONEAL DIALYSIS CATHETER N/A 9/17/2018    Procedure: REVISION OF PROCEDURE INVOLVING PERITONEAL DIALYSIS CATHETER, LAPAROSCOPIC;  Surgeon: Kannan Piedra MD;  Location: [facility];  Service: General;  Laterality: N/A;    LEFT HEART CATHETERIZATION Left 7/14/2021    Procedure: Left heart cath;  Surgeon: Corey Aguilar MD;  Location: [facility] CATH;  Service: Cardiovascular;  Laterality: Left;    NEUROMA SURGERY Left     knee    OPEN REDUCTION AND INTERNAL FIXATION (ORIF) OF FRACTURE OF DISTAL RADIUS Left 8/3/2020    Procedure: ORIF, FRACTURE,/ BONE GRAFT/ ORIF Scaphoid;  Surgeon: MIKO Fishman MD;  Location: [facility];  Service: Orthopedics;  Laterality: Left;  Dr. Fishman completed his surgery at 1159    OTHER SURGICAL HISTORY      peritoneal catheter move X2    PERITONEAL CATHETER REMOVAL N/A 11/23/2019    Procedure: REMOVAL, CATHETER, DIALYSIS, PERITONEAL;  Surgeon: Edilson Sherman MD;  Location: [facility];  Service: General;  Laterality: N/A;    PLACEMENT PERITONEAL DIALYSIS CATHETER  2011     Social History     Tobacco Use    Smoking  status: Former     Current packs/day: 0.00     Average packs/day: 0.7 packs/day for 42.3 years (27.9 ttl pk-yrs)     Types: Cigarettes     Start date:      Quit date: 2020     Years since quittin.6     Passive exposure: Never    Smokeless tobacco: Never    Tobacco comments:     quit a couple of times previously   Substance Use Topics    Alcohol use: Yes     Comment: occasionally    Drug use: No        Review of Systems:    As noted in HPI in addition         REVIEW OF SYSTEMS  Review of Systems   Constitutional: Negative for decreased appetite, diaphoresis, night sweats, weight gain and weight loss.   HENT:  Negative for nosebleeds and odynophagia.    Eyes:  Negative for double vision and photophobia.   Cardiovascular:  Negative for chest pain, claudication, cyanosis, dyspnea on exertion, irregular heartbeat, leg swelling, near-syncope, orthopnea, palpitations, paroxysmal nocturnal dyspnea and syncope.   Respiratory:  Negative for cough, hemoptysis, shortness of breath and wheezing.    Hematologic/Lymphatic: Negative for adenopathy.   Skin:  Negative for flushing, skin cancer and suspicious lesions.   Musculoskeletal:  Negative for gout, myalgias and neck pain.   Gastrointestinal:  Negative for abdominal pain, heartburn, hematemesis and hematochezia.   Genitourinary:  Negative for bladder incontinence, hesitancy and nocturia.   Neurological:  Negative for focal weakness, headaches, light-headedness and paresthesias.   Psychiatric/Behavioral:  Negative for memory loss and substance abuse.        Objective:        Vitals:    24 1316   BP: (!) 92/50   Pulse: 61       Lab Results   Component Value Date    WBC 6.03 2024    HGB 14.7 2024    HCT 46.1 2024     2024    CHOL 165 2024    TRIG 156 (H) 2024    HDL 58 2024    ALT 10 2024    AST 18 2024     2024    K 5.4 (H) 2024     2024    CREATININE 1.16 2024     BUN 23 12/06/2024    CO2 25 12/06/2024    TSH 1.600 06/25/2024    INR 1.1 04/06/2017    HGBA1C 5.4 12/17/2024      CARDIOGRAM RESULTS  Results for orders placed during the hospital encounter of 06/19/24    Echo    Interpretation Summary    Left Ventricle: The left ventricle is normal in size. There is normal systolic function with a visually estimated ejection fraction of 55 - 60%. There is normal diastolic function.    Right Ventricle: Normal right ventricular cavity size. Systolic function is normal.    Left Atrium: Left atrium is dilated.    Right Atrium: Right atrium is dilated.    Aortic Valve: There is mild stenosis. Aortic valve area by VTI is 1.69 cm². Aortic valve peak velocity is 2.05 m/s. Mean gradient is 10 mmHg. The dimensionless index is 0.60.    IVC/SVC: Intermediate venous pressure at 8 mmHg.    Results for orders placed during the hospital encounter of 07/14/21    Cardiac catheterization    Conclusion  · Angiographically normal coronary arteries in a left dominant system  · The post-procedure left ventricular end diastolic pressure was 20.  · The ejection fraction was calculated to be 55%.    The procedure log was documented by Documenter: Zita Goodwin RN and verified by Corey Aguilar MD.    Date: 7/15/2021  Time: 2:30 PM          CURRENT/PREVIOUS VISIT EKG  Results for orders placed or performed during the hospital encounter of 08/07/24   EKG 12-lead    Collection Time: 08/07/24  2:36 PM   Result Value Ref Range    QRS Duration 78 ms    [facility] QTC Calculation 389 ms    Narrative    Test Reason : R07.9,    Vent. Rate : 064 BPM     Atrial Rate : 064 BPM     P-R Int : 172 ms          QRS Dur : 078 ms      QT Int : 378 ms       P-R-T Axes : 071 036 060 degrees     QTc Int : 389 ms    Normal sinus rhythm  Septal infarct (cited on or before 19-JUN-2024)  Abnormal ECG  When compared with ECG of 19-JUN-2024 22:24,  No significant change was found  Confirmed by William YARBROUGH, Cassius ([id number]) on 8/9/2024 11:38:30  AM    Referred By:  CHEY           Confirmed By:Cassius Thomas MD     No valid procedures specified.   No results found for this or any previous visit.    No valid procedures specified.        PHYSICAL EXAM  GENERAL: well built, well nourished, well-developed in no apparent distress alert and oriented.   HEENT: Normocephalic. Pupils normal and conjunctivae normal.  Mucous membranes normal, no cyanosis or icterus, trachea central,no pallor or icterus is noted..   NECK: No JVD. No bruit..   THYROID: Thyroid not enlarged. No nodules present..   CARDIAC:  Normal S1-S2.  Grade 1/6 crescendo decrescendo murmur aortic area.  Intermittent known runs of nonsustained tachycardia  .   LUNGS: Clear to auscultation. No wheezing or rhonchi..   ABDOMEN: Soft no masses or organomegaly.  No abdomen pulsations or bruits.  Normal bowel sounds. No pulsations and no masses felt, No guarding or rebound.   URINARY: No horton catheter   EXTREMITIES: No cyanosis, clubbing or edema noted at this time., no calf tenderness bilaterally.   PERIPHERAL VASCULAR SYSTEM: Good palpable distal pulses.  2+ femoral, popliteal and pedal pulses.  No bruits    CENTRAL NERVOUS SYSTEM: No focal motor or sensory deficits noted.   SKIN: Skin without lesions, moist, well perfused.   MUSCLE STRENGTH & TONE: No noteable weakness, atrophy or abnormal movement.     I HAVE REVIEWED :    The vital signs, nurses notes, and all the pertinent radiology and labs.         Current Outpatient Medications   Medication Instructions    alendronate (FOSAMAX) 70 MG tablet 12    aspirin (ECOTRIN) 81 mg, Daily    biotin 5 mg Cap 1 capsule, Daily    cinacalcet (SENSIPAR) 60 mg, With breakfast    ciprofloxacin HCl (CIPRO) 500 mg, Oral, Every 12 hours    EScitalopram oxalate (LEXAPRO) 10 mg, Oral, Daily    HYDROcodone-acetaminophen (NORCO)  mg per tablet 1 tablet, Oral, Every 6 hours PRN    LIDOcaine (XYLOCAINE) 5 % Oint ointment Topical (Top), 3 times daily PRN     metoclopramide HCl (REGLAN) 5 MG tablet 60 tablets    multivit with minerals/lutein (MULTIVITAMIN 50 PLUS ORAL) 1 tablet, Daily    mycophenolate sodium 360 mg, 2 times daily    ondansetron (ZOFRAN-ODT) 4 MG TbDL DISSOLVE 1 TABLET BY MOUTH EVERY 8 HOURS AS NEEDED FOR NAUSEA    pantoprazole (PROTONIX) 40 MG tablet TAKE 1 TABLET BY MOUTH 2 TIMES DAILY    pregabalin (LYRICA) 150 mg, Oral, Nightly    tacrolimus (PROGRAF) 3 mg, 3 times daily    triamcinolone acetonide 0.1% (KENALOG) 0.1 % cream Topical (Top), 2 times daily    valACYclovir (VALTREX) 1,000 mg, Oral, 3 times daily    varenicline (CHANTIX STARTING MONTH BOX) 0.5 mg (11)- 1 mg (42) tablet Take one 0.5mg tab by mouth once daily X3 days,then increase to one 0.5mg tab twice daily X4 days,then increase to one 1mg tab twice daily    varenicline (CHANTIX) 1 mg, Oral, 2 times daily          Assessment:   Renal transplant 3 years ago.  Prior history of polycystic kidney disease.  Recent hospitalization for volume overload CHF, treated with Lasix.  Transplant rejection medications reconfigured.  Recent echo preserved EF, mild AS.  AS murmur present.    Abnormal baseline EKG with nonsustained runs of wide complex tachyarrhythmia, rule out monomorphic VT.    Elevated potassium level 11 days ago with creatinine 1.16.    Plan:   Recheck potassium level today.  Holter monitor..  Echo  AS by echo mild with preserved EF, June 2024  Normal coronary arteries documented 2021.  Follow  clinically          No follow-ups on file.

## 2024-12-19 LAB
OHS QRS DURATION: 72 MS
OHS QTC CALCULATION: 487 MS

## 2024-12-24 ENCOUNTER — TELEPHONE (OUTPATIENT)
Dept: CARDIOLOGY | Facility: CLINIC | Age: 59
End: 2024-12-24
Payer: MEDICARE

## 2024-12-24 NOTE — TELEPHONE ENCOUNTER
Spoke to pt and advised per Tish (BNP is elevated but stable compared to previous. If GI doctor wants cardiac clearance for colonoscopy they will contact our office)

## 2024-12-24 NOTE — TELEPHONE ENCOUNTER
----- Message from Fred sent at 12/24/2024  9:20 AM CST -----  Regarding: advice  Contact: VEE RENAE [[id number]]  Type: Needs Medical Advice  Who Called:  Vee    Symptoms (please be specific):  na    How long has patient had these symptoms:  na    Pharmacy name and phone #:  na    Best Call Back Number: [phone]    Additional Information: Needs test results and asking if it is ok to have colonoscopy done. Please call to advise.

## 2025-01-01 ENCOUNTER — PATIENT MESSAGE (OUTPATIENT)
Dept: OTOLARYNGOLOGY | Facility: CLINIC | Age: 60
End: 2025-01-01
Payer: MEDICARE

## 2025-01-07 ENCOUNTER — HOSPITAL ENCOUNTER (OUTPATIENT)
Dept: RADIOLOGY | Facility: HOSPITAL | Age: 60
Discharge: HOME | End: 2025-01-07
Payer: MEDICARE

## 2025-01-07 DIAGNOSIS — R31.0 GROSS HEMATURIA: ICD-10-CM

## 2025-01-07 PROCEDURE — 74178 CT ABD&PLV WO CNTR FLWD CNTR: CPT | Mod: TC,PO

## 2025-01-07 PROCEDURE — 25500020 PHARM REV CODE 255: Mod: PO

## 2025-01-07 PROCEDURE — 74178 CT ABD&PLV WO CNTR FLWD CNTR: CPT | Mod: 26,,, | Performed by: RADIOLOGY

## 2025-01-07 RX ADMIN — IOHEXOL 125 ML: 350 INJECTION, SOLUTION INTRAVENOUS at 03:01

## 2025-01-14 ENCOUNTER — TELEPHONE (OUTPATIENT)
Dept: UROLOGY | Facility: CLINIC | Age: 60
End: 2025-01-14
Payer: MEDICARE

## 2025-01-14 ENCOUNTER — TELEPHONE (OUTPATIENT)
Dept: CARDIOLOGY | Facility: CLINIC | Age: 60
End: 2025-01-14
Payer: MEDICARE

## 2025-01-14 NOTE — TELEPHONE ENCOUNTER
----- Message from Omkar Syed MD sent at 1/14/2025 12:50 PM CST -----  Regarding: RE: results  Contact: patient  ECHO AND HOLTER MONITOR ARE NORMAL  ----- Message -----  From: Corinna Rao MA  Sent: 1/14/2025  11:52 AM CST  To: Omkar Syed MD  Subject: FW: results                                      Patient would like to get test results  ----- Message -----  From: Nerissa Smiley  Sent: 1/14/2025  11:46 AM CST  To: Kunal Espitia Staff  Subject: results                                          Type:  Test Results    Who Called:  patient  Name of Test (Lab/Mammo/Etc):  halter monitor, Echo  Date of Test:  12/17/24  Ordering Provider:  Dr. Syed  Where the test was performed:  [facility]  Best Call Back Number:   [phone]  Additional Information:  Please call patient to advise.  Thanks!

## 2025-01-14 NOTE — TELEPHONE ENCOUNTER
----- Message from Nerissa sent at 1/14/2025 11:46 AM CST -----  Regarding: results  Contact: patient  Type:  Test Results    Who Called:  patient  Name of Test (Lab/Mammo/Etc):  Ct Scan  Date of Test:  01/07/24  Ordering Provider:  Tiffany Rossi  Where the test was performed:  [facility]  Best Call Back Number:  [phone]  Additional Information:  Please call patient to advise.  Thanks!

## 2025-01-15 ENCOUNTER — TELEPHONE (OUTPATIENT)
Dept: CARDIOLOGY | Facility: CLINIC | Age: 60
End: 2025-01-15
Payer: MEDICARE

## 2025-01-15 NOTE — TELEPHONE ENCOUNTER
----- Message from Omkar Syed MD sent at 1/14/2025  5:55 PM CST -----  Regarding: RE: results  Contact: patient  The Holter monitor and echo relatively stable, follow up as scheduled.  Continue To monitor vital signs  ----- Message -----  From: Corinna Rao MA  Sent: 1/14/2025   1:28 PM CST  To: Omkar Syed MD  Subject: FW: results                                      Patient wants to know should she be concerned. She said when she had EKG in the office her pulse was 122 and you seemed concerned and had her do a Echo at the time of visit. She say she do have heart palpitations sometimes but it didn't feel like that.  ----- Message -----  From: Omkar Syed MD  Sent: 1/14/2025  12:50 PM CST  To: Corinna Rao MA  Subject: RE: results                                      ECHO AND HOLTER MONITOR ARE NORMAL  ----- Message -----  From: Corinna Rao MA  Sent: 1/14/2025  11:52 AM CST  To: Omkar Syed MD  Subject: FW: results                                      Patient would like to get test results  ----- Message -----  From: Nerisas Smiley  Sent: 1/14/2025  11:46 AM CST  To: Kunal Cornelius  Subject: results                                          Type:  Test Results    Who Called:  patient  Name of Test (Lab/Mammo/Etc):  halter monitor, Echo  Date of Test:  12/17/24  Ordering Provider:  Dr. Syed  Where the test was performed:  [facility]  Best Call Back Number:   [phone]  Additional Information:  Please call patient to advise.  Thanks!

## 2025-01-20 ENCOUNTER — TELEPHONE (OUTPATIENT)
Dept: UROLOGY | Facility: CLINIC | Age: 60
End: 2025-01-20
Payer: MEDICARE

## 2025-01-24 PROBLEM — I50.9 CHRONIC CONGESTIVE HEART FAILURE, UNSPECIFIED HEART FAILURE TYPE: Status: ACTIVE | Noted: 2025-01-24

## 2025-01-24 PROBLEM — N18.6 END STAGE RENAL DISEASE: Status: RESOLVED | Noted: 2017-04-06 | Resolved: 2025-01-24

## 2025-01-27 ENCOUNTER — OFFICE VISIT (OUTPATIENT)
Dept: CARDIOLOGY | Facility: CLINIC | Age: 60
End: 2025-01-27
Payer: MEDICARE

## 2025-01-27 VITALS
DIASTOLIC BLOOD PRESSURE: 73 MMHG | SYSTOLIC BLOOD PRESSURE: 132 MMHG | HEIGHT: 65 IN | HEART RATE: 92 BPM | BODY MASS INDEX: 21.01 KG/M2 | RESPIRATION RATE: 20 BRPM | WEIGHT: 126.13 LBS

## 2025-01-27 DIAGNOSIS — I50.9 CHRONIC CONGESTIVE HEART FAILURE, UNSPECIFIED HEART FAILURE TYPE: Primary | ICD-10-CM

## 2025-01-27 DIAGNOSIS — I10 ESSENTIAL HYPERTENSION: ICD-10-CM

## 2025-01-27 PROCEDURE — 3008F BODY MASS INDEX DOCD: CPT | Mod: CPTII,S$GLB,, | Performed by: INTERNAL MEDICINE

## 2025-01-27 PROCEDURE — 1160F RVW MEDS BY RX/DR IN RCRD: CPT | Mod: CPTII,S$GLB,, | Performed by: INTERNAL MEDICINE

## 2025-01-27 PROCEDURE — 3078F DIAST BP <80 MM HG: CPT | Mod: CPTII,S$GLB,, | Performed by: INTERNAL MEDICINE

## 2025-01-27 PROCEDURE — 3075F SYST BP GE 130 - 139MM HG: CPT | Mod: CPTII,S$GLB,, | Performed by: INTERNAL MEDICINE

## 2025-01-27 PROCEDURE — 99214 OFFICE O/P EST MOD 30 MIN: CPT | Mod: S$GLB,,, | Performed by: INTERNAL MEDICINE

## 2025-01-27 PROCEDURE — 1159F MED LIST DOCD IN RCRD: CPT | Mod: CPTII,S$GLB,, | Performed by: INTERNAL MEDICINE

## 2025-01-27 PROCEDURE — 99999 PR PBB SHADOW E&M-EST. PATIENT-LVL III: CPT | Mod: PBBFAC,,, | Performed by: INTERNAL MEDICINE

## 2025-01-27 RX ORDER — METOPROLOL SUCCINATE 25 MG/1
25 TABLET, EXTENDED RELEASE ORAL DAILY
Qty: 90 TABLET | Refills: 3 | Status: SHIPPED | OUTPATIENT
Start: 2025-01-27 | End: 2026-01-27

## 2025-01-27 NOTE — PROGRESS NOTES
Subjective:    Patient ID:  Vee Navarro is a 59 y.o. female who presents for follow-up of Follow-up      HPI  She comes with shortness of breath with moderate activity.  No chest pain.  No syncope or near-syncope   History of mild aortic stenosis.  Recent echocardiogram shows aortic valve area of 1.6 cm.  Normal LV systolic function.  Holter monitor last month revealed a than 30% PVC burden.    She did have a normal coronary angiography 3 years ago before her kidney transplant    Review of Systems   Constitutional: Negative for decreased appetite, malaise/fatigue, weight gain and weight loss.   Cardiovascular:  Negative for chest pain, dyspnea on exertion, leg swelling, palpitations and syncope.   Respiratory:  Negative for cough and shortness of breath.    Gastrointestinal: Negative.    Neurological:  Negative for weakness.   All other systems reviewed and are negative.       Objective:      Physical Exam  Vitals and nursing note reviewed.   Constitutional:       Appearance: Normal appearance. She is well-developed.   HENT:      Head: Normocephalic.   Eyes:      Pupils: Pupils are equal, round, and reactive to light.   Neck:      Thyroid: No thyromegaly.      Vascular: No carotid bruit or JVD.   Cardiovascular:      Rate and Rhythm: Normal rate and regular rhythm. Frequent Extrasystoles are present.     Chest Wall: PMI is not displaced.      Pulses: Normal pulses and intact distal pulses.      Heart sounds: Murmur heard.      Harsh midsystolic murmur is present with a grade of 2/6 at the upper right sternal border radiating to the neck.      No gallop.   Pulmonary:      Effort: Pulmonary effort is normal.      Breath sounds: Normal breath sounds.   Abdominal:      Palpations: Abdomen is soft. There is no mass.      Tenderness: There is no abdominal tenderness.   Musculoskeletal:         General: Normal range of motion.      Cervical back: Normal range of motion and neck supple.   Skin:     General: Skin is warm.    Neurological:      Mental Status: She is alert and oriented to person, place, and time.      Sensory: No sensory deficit.      Deep Tendon Reflexes: Reflexes are normal and symmetric.             Most Recent EKG Results  Results for orders placed or performed in visit on 12/17/24   IN OFFICE EKG 12-LEAD (to [place])    Collection Time: 12/17/24 12:57 PM   Result Value Ref Range    QRS Duration 72 ms    OHS QTC Calculation 487 ms    Narrative    Test Reason : I35.8,R06.02,    Vent. Rate : 122 BPM     Atrial Rate :  86 BPM     P-R Int : 170 ms          QRS Dur :  72 ms      QT Int : 342 ms       P-R-T Axes :  66  21  57 degrees    QTcB Int : 487 ms    Sinus rhythm with very frequent outflow tract PVC/NSVT   Abnormal ECG  When compared with ECG of 07-Aug-2024 14:36,  Premature ventricular complexes are now Present    Confirmed by Price Sands ([id number]) on 12/19/2024 9:08:45 AM    Referred By: TOMMY OSCAR           Confirmed By: Price Sands       Most Recent Echocardiogram Results  Results for orders placed during the hospital encounter of 12/17/24    Echo    Interpretation Summary    Left Ventricle: The left ventricle is normal in size. Normal wall thickness. There is normal systolic function with a visually estimated ejection fraction of 60 - 65%. Grade I diastolic dysfunction.    Right Ventricle: Normal right ventricular cavity size. Wall thickness is normal. Systolic function is normal.    Left Atrium: Left atrium is moderately dilated.    Aortic Valve: There is mild aortic valve sclerosis. Mildly restricted motion. There is mild stenosis. Aortic valve area by VTI is 1.6 cm². Aortic valve peak velocity is 2.3 m/s. Mean gradient is 10.2 mmHg. The dimensionless index is 0.52.    Mitral Valve: There is mild regurgitation.    Pulmonary Artery: There is mild pulmonary hypertension. The estimated pulmonary artery systolic pressure is 38 mmHg.    IVC/SVC: Normal venous pressure at 3 mmHg.      Most Recent Nuclear Stress  Test Results  No results found for this or any previous visit.      Most Recent Cardiac PET Stress Test Results  No results found for this or any previous visit.      Most Recent Cardiovascular Angiogram results  Results for orders placed during the hospital encounter of 07/14/21    Cardiac catheterization    Conclusion  · Angiographically normal coronary arteries in a left dominant system  · The post-procedure left ventricular end diastolic pressure was 20.  · The ejection fraction was calculated to be 55%.    The procedure log was documented by Documenter: Zita Goodwin RN and verified by Corey Aguilar MD.    Date: 7/15/2021  Time: 2:30 PM      Other Most Recent Cardiology Results  Results for orders placed during the hospital encounter of 12/17/24    Holter monitor - 48 hour    Interpretation Summary    2 day cardiac event monitor    Predominant rhythm was sinus rhythm with an average heart rate    Rare PACs less than 1%    Very high PVC burden 31% which appeared unifocal. Several nonsustained VT episodes. Longest nonsustained VT episode 12 beats.    No evidence of AF/AFL    No clinically significant bradyarrhythmias    No patient triggered episodes      Labs reviewed    Assessment:       1. Chronic congestive heart failure, unspecified heart failure type    2. Essential hypertension         Plan:     Metoprolol succinate 25 mg p.o. daily  Regular exercise program    Refer to Dr. David Sands for evaluation of PVCs

## 2025-01-29 ENCOUNTER — TELEPHONE (OUTPATIENT)
Dept: UROLOGY | Facility: CLINIC | Age: 60
End: 2025-01-29
Payer: MEDICARE

## 2025-01-29 NOTE — TELEPHONE ENCOUNTER
Called and spoke with pt, pt is upset because her appt on 01/21 was cancelled. She does not understand why we cancel another pt appt to see her as an exception. Whe stated that the NP know she keep Rutis and need to be seen immediately. Explained to the pt that the procedures are not done on a daily and that the provider does them on certain days. Also explained to her that we got her in for the next available appt. Apologized for the wait, but assured her that we will be getting her in for the earliest appt that we have available. QR----- Message from [employer] sent at 1/29/2025  9:24 AM CST -----  Regarding: Needs sooner appt  Type: Needs Medical Advice  Who Called:  Pt    Best Call Back Number: [phone]    Additional Information: Pt is upset that her ppt was rescheduled to 1/21 so she is asking if she can get a sooner appt for the cystocopy please armani

## 2025-01-30 ENCOUNTER — OFFICE VISIT (OUTPATIENT)
Dept: CARDIOLOGY | Facility: CLINIC | Age: 60
End: 2025-01-30
Payer: MEDICARE

## 2025-01-30 VITALS
RESPIRATION RATE: 18 BRPM | BODY MASS INDEX: 20.87 KG/M2 | WEIGHT: 125.25 LBS | SYSTOLIC BLOOD PRESSURE: 122 MMHG | HEIGHT: 65 IN | HEART RATE: 84 BPM | DIASTOLIC BLOOD PRESSURE: 73 MMHG

## 2025-01-30 DIAGNOSIS — I10 ESSENTIAL HYPERTENSION: Primary | ICD-10-CM

## 2025-01-30 DIAGNOSIS — I50.9 CHRONIC CONGESTIVE HEART FAILURE, UNSPECIFIED HEART FAILURE TYPE: ICD-10-CM

## 2025-01-30 DIAGNOSIS — I49.3 PVC (PREMATURE VENTRICULAR CONTRACTION): ICD-10-CM

## 2025-01-30 LAB
OHS QRS DURATION: 70 MS
OHS QTC CALCULATION: 437 MS

## 2025-01-30 PROCEDURE — 1160F RVW MEDS BY RX/DR IN RCRD: CPT | Mod: CPTII,S$GLB,, | Performed by: INTERNAL MEDICINE

## 2025-01-30 PROCEDURE — 99205 OFFICE O/P NEW HI 60 MIN: CPT | Mod: 25,S$GLB,, | Performed by: INTERNAL MEDICINE

## 2025-01-30 PROCEDURE — 3078F DIAST BP <80 MM HG: CPT | Mod: CPTII,S$GLB,, | Performed by: INTERNAL MEDICINE

## 2025-01-30 PROCEDURE — 3074F SYST BP LT 130 MM HG: CPT | Mod: CPTII,S$GLB,, | Performed by: INTERNAL MEDICINE

## 2025-01-30 PROCEDURE — 99999 PR PBB SHADOW E&M-EST. PATIENT-LVL III: CPT | Mod: PBBFAC,,, | Performed by: INTERNAL MEDICINE

## 2025-01-30 PROCEDURE — 93005 ELECTROCARDIOGRAM TRACING: CPT | Mod: PO

## 2025-01-30 PROCEDURE — 93010 ELECTROCARDIOGRAM REPORT: CPT | Mod: S$GLB,,, | Performed by: INTERNAL MEDICINE

## 2025-01-30 PROCEDURE — 3008F BODY MASS INDEX DOCD: CPT | Mod: CPTII,S$GLB,, | Performed by: INTERNAL MEDICINE

## 2025-01-30 PROCEDURE — 1159F MED LIST DOCD IN RCRD: CPT | Mod: CPTII,S$GLB,, | Performed by: INTERNAL MEDICINE

## 2025-01-30 NOTE — PROGRESS NOTES
SUBJECTIVE:    Patient ID:  Vee Navarro is a 59 y.o. female who was referred as a new patient to electrophysiology clinic for management of PVCs.    Medical history:  Outflow tract PVC/VT  HTN   HFpEF   Pulmonary hypertension (PA systolic pressure 38 mm Hg)  Mild aortic stenosis   Polycystic kidney disease s/p kidney transplant 2021    Cardiologist: Dr. Mota    Patient has history of PVC and nonsustained VT.  Recently started on Toprol-XL 25 mg daily.  She underwent a left heart catheterization 7/21 which showed angiographically normal coronaries.  Echocardiogram 12/24 shows a normal EF 60-65% and mild aortic stenosis.    48 hour Holter with unifocal PVCs 31%.  Nonsustained VT up to 12 beats.    Admit 6/24 with volume overload.  Echo at that time normal EF with CVP 8.  She is given a 1 time dose of Lasix with auto diuresis and recovery after.  She is not on diuretics chronically.    She is followed by Dr. Watson at [facility].  She is on Prograf and CellCept for immunosuppression.    1/30/25  Has been on metoprolol for 1-2 days.  Has not noted any significant changes.  Patient reports palpitations with PVCs.  Denies angina.  Denies CHF symptoms today.    I personally reviewed the ECG/telemetry strip today. My interpretation is normal sinus rhythm with frequent PVCs (RI, LB with V3 transition)    Past Medical History:   Diagnosis Date    Acid reflux     Acute medial meniscus tear of right knee 04/2022    Anemia     Arthritis     Bacteremia     Chronic diastolic CHF (congestive heart failure)     Dialysis patient     resolved w/ transplant    Encounter for blood transfusion     End stage kidney disease     History of renal transplant 2021    [place] (hard of hearing)     Hypertension     Lumbar radiculopathy     Peritonitis     Renal failure     was on HD then PD then HD then kidney transplant in 2021 then ckd III    Short-term memory loss     Thyroid disease     Vitamin B12 deficiency        Past Surgical  History:   Procedure Laterality Date    arm surgery Left     ARTHROSCOPY OF KNEE Right 7/26/2022    Procedure: ARTHROSCOPY, KNEE;  Surgeon: Tam Rosario MD;  Location: [facility];  Service: Orthopedics;  Laterality: Right;    BACK SURGERY  1982    CARPAL TUNNEL RELEASE Left 8/3/2020    Procedure: RELEASE, CARPAL TUNNEL;  Surgeon: MIKO Fishman MD;  Location: [facility];  Service: Orthopedics;  Laterality: Left;    COLONOSCOPY N/A 12/12/2017    Procedure: COLONOSCOPY;  Surgeon: Elpidio Casillas Jr., MD;  Location: [facility] ENDO;  Service: Endoscopy;  Laterality: N/A;    COLONOSCOPY N/A 1/25/2021    Procedure: COLONOSCOPY;  Surgeon: Elpidio Casillas Jr., MD;  Location: [facility] ENDO;  Service: Endoscopy;  Laterality: N/A;    CORONARY ANGIOGRAPHY N/A 7/14/2021    Procedure: ANGIOGRAM, CORONARY ARTERY;  Surgeon: Corey Aguilar MD;  Location: [facility] CATH;  Service: Cardiovascular;  Laterality: N/A;    DIALYSIS FISTULA CREATION      EXCISION OF MEDIAL MENISCUS OF KNEE Right 7/26/2022    Procedure: MENISCECTOMY, KNEE, MEDIAL;  Surgeon: Tam Rosario MD;  Location: [facility];  Service: Orthopedics;  Laterality: Right;  partial     FOOT SURGERY  03/2019    FRACTURE SURGERY      KIDNEY STONE SURGERY  1983    KIDNEY TRANSPLANT Right 10/2021    KNEE ARTHROSCOPY Right 07/26/2022    right knee scope    LAPAROSCOPIC REVISION OF PROCEDURE INVOLVING PERITONEAL DIALYSIS CATHETER N/A 9/17/2018    Procedure: REVISION OF PROCEDURE INVOLVING PERITONEAL DIALYSIS CATHETER, LAPAROSCOPIC;  Surgeon: Kannan Piedra MD;  Location: [facility];  Service: General;  Laterality: N/A;    LEFT HEART CATHETERIZATION Left 7/14/2021    Procedure: Left heart cath;  Surgeon: Corey Aguilar MD;  Location: [facility];  Service: Cardiovascular;  Laterality: Left;    NEUROMA SURGERY Left     knee    OPEN REDUCTION AND INTERNAL FIXATION (ORIF) OF FRACTURE OF DISTAL RADIUS Left 8/3/2020    Procedure: ORIF, FRACTURE,/ BONE GRAFT/ ORIF Scaphoid;  Surgeon: MIKO  Lizandro Fishman MD;  Location: [facility];  Service: Orthopedics;  Laterality: Left;  Dr. Fishman completed his surgery at 1159    OTHER SURGICAL HISTORY      peritoneal catheter move X2    PERITONEAL CATHETER REMOVAL N/A 2019    Procedure: REMOVAL, CATHETER, DIALYSIS, PERITONEAL;  Surgeon: Edilson Sherman MD;  Location: [facility];  Service: General;  Laterality: N/A;    PLACEMENT PERITONEAL DIALYSIS CATHETER         Family History   Problem Relation Name Age of Onset    Mental illness Brother      Heart disease Maternal Grandmother      Cancer Maternal Grandfather      Stroke Paternal Grandmother      Cancer Paternal Grandfather      No Known Problems Brother      Arthritis Mother      Hypertension Father      Suicide Father         Social History     Socioeconomic History    Marital status: Single   Tobacco Use    Smoking status: Former     Current packs/day: 0.00     Average packs/day: 0.7 packs/day for 42.3 years (27.9 ttl pk-yrs)     Types: Cigarettes     Start date:      Quit date: 2020     Years since quittin.7     Passive exposure: Never    Smokeless tobacco: Never    Tobacco comments:     quit a couple of times previously   Substance and Sexual Activity    Alcohol use: Yes     Comment: occasionally    Drug use: No    Sexual activity: Not Currently     Partners: Male     Social Drivers of Health     Financial Resource Strain: Medium Risk (2024)    Overall Financial Resource Strain (CARDIA)     Difficulty of Paying Living Expenses: Somewhat hard   Food Insecurity: No Food Insecurity (2024)    Hunger Vital Sign     Worried About Running Out of Food in the Last Year: Never true     Ran Out of Food in the Last Year: Never true   Transportation Needs: No Transportation Needs (2024)    TRANSPORTATION NEEDS     Transportation : No   Physical Activity: Sufficiently Active (2024)    Exercise Vital Sign     Days of Exercise per Week: 3 days     Minutes of Exercise per Session: 90  "min   Stress: Stress Concern Present (6/25/2024)    [employer] - Occupational Stress Questionnaire     Feeling of Stress : Very much   Housing Stability: Low Risk  (6/25/2024)    Housing Stability Vital Sign     Unable to Pay for Housing in the Last Year: No     Homeless in the Last Year: No       Review of patient's allergies indicates:   Allergen Reactions    Morphine      Other reaction(s): rash, vomiting       Review of Systems   Constitutional: Negative for chills and fever.   HENT:  Negative for congestion and hearing loss.    Eyes:  Negative for blurred vision and double vision.   Cardiovascular:         See HPI   Respiratory:  Negative for cough, hemoptysis and shortness of breath.    Endocrine: Negative for cold intolerance and heat intolerance.   Musculoskeletal:  Negative for joint pain and joint swelling.   Gastrointestinal:  Negative for abdominal pain, nausea and vomiting.   Genitourinary:  Negative for dysuria and hematuria.   Neurological:  Negative for focal weakness and headaches.   Psychiatric/Behavioral:  Negative for altered mental status.    All other systems reviewed and are negative.         OBJECTIVE:     Vitals:    01/30/25 1345   BP: 122/73   Pulse: 84   Resp: 18   Weight: 56.8 kg (125 lb 3.5 oz)   Height: 5' 5" (1.651 m)       BP Readings from Last 5 Encounters:   01/30/25 122/73   01/27/25 132/73   01/24/25 118/60   12/17/24 (!) 92/50   12/11/24 (!) 140/74        Physical Exam  Vitals and nursing note reviewed.   Constitutional:       General: She is not in acute distress.     Appearance: She is well-developed. She is not diaphoretic.   HENT:      Head: Normocephalic and atraumatic.   Eyes:      General: No scleral icterus.        Right eye: No discharge.         Left eye: No discharge.   Cardiovascular:      Rate and Rhythm: Normal rate and regular rhythm. No extrasystoles are present.     Pulses: Normal pulses and intact distal pulses.           Radial " pulses are 2+ on the right side and 2+ on the left side.      Heart sounds: Normal heart sounds, S1 normal and S2 normal. Heart sounds not distant. No opening snap. No murmur heard.     No friction rub. No gallop. No S3 or S4 sounds.   Pulmonary:      Effort: Pulmonary effort is normal. No respiratory distress.      Breath sounds: No wheezing or rales.   Abdominal:      General: Bowel sounds are normal. There is no distension.      Palpations: Abdomen is soft.      Tenderness: There is no abdominal tenderness.   Musculoskeletal:         General: No deformity. Normal range of motion.      Cervical back: Normal range of motion and neck supple.   Skin:     General: Skin is warm and dry.      Findings: No erythema or rash.   Neurological:      Mental Status: She is alert.             Current Outpatient Medications   Medication Instructions    alendronate (FOSAMAX) 70 MG tablet 12    aspirin (ECOTRIN) 81 mg, Daily    cinacalcet (SENSIPAR) 60 mg, With breakfast    doxycycline (MONODOX) 100 mg, Oral, Every 12 hours, Take with food    metoclopramide HCl (REGLAN) 5 MG tablet 60 tablets    metoprolol succinate (TOPROL-XL) 25 mg, Oral, Daily    montelukast (SINGULAIR) 10 mg, Oral, Nightly    multivit with minerals/lutein (MULTIVITAMIN 50 PLUS ORAL) 1 tablet, Daily    mycophenolate sodium 360 mg, 2 times daily    ondansetron (ZOFRAN-ODT) 4 MG TbDL DISSOLVE 1 TABLET BY MOUTH EVERY 8 HOURS AS NEEDED FOR NAUSEA    pantoprazole (PROTONIX) 40 MG tablet TAKE 1 TABLET BY MOUTH 2 TIMES DAILY    promethazine-dextromethorphan (PROMETHAZINE-DM) 6.25-15 mg/5 mL Syrp 5 mLs, Oral, Every 8 hours PRN    tacrolimus (PROGRAF) 3 mg, 3 times daily       Lipid Panel:   Lab Results   Component Value Date    CHOL 165 06/20/2024    HDL 58 06/20/2024    LDLCALC 75.8 06/20/2024    TRIG 156 (H) 06/20/2024    CHOLHDL 35.2 06/20/2024       The 10-year ASCVD risk score (Laya BRYANT, et al., 2019) is: 3.8%    Values used to calculate the score:      Age: 59  years      Sex: Female      Is Non- : No      Diabetic: No      Tobacco smoker: No      Systolic Blood Pressure: 122 mmHg      Is BP treated: Yes      HDL Cholesterol: 58 mg/dL      Total Cholesterol: 165 mg/dL    Most Recent EKG Results  Results for orders placed or performed in visit on 12/17/24   IN OFFICE EKG 12-LEAD (to [place])    Collection Time: 12/17/24 12:57 PM   Result Value Ref Range    QRS Duration 72 ms    OHS QTC Calculation 487 ms    Narrative    Test Reason : I35.8,R06.02,    Vent. Rate : 122 BPM     Atrial Rate :  86 BPM     P-R Int : 170 ms          QRS Dur :  72 ms      QT Int : 342 ms       P-R-T Axes :  66  21  57 degrees    QTcB Int : 487 ms    Sinus rhythm with very frequent outflow tract PVC/NSVT   Abnormal ECG  When compared with ECG of 07-Aug-2024 14:36,  Premature ventricular complexes are now Present    Confirmed by Price Sands ([id number]) on 12/19/2024 9:08:45 AM    Referred By: TOMMY OSCAR           Confirmed By: Price Sands       Most Recent Echocardiogram Results  Results for orders placed during the hospital encounter of 12/17/24    Echo    Interpretation Summary    Left Ventricle: The left ventricle is normal in size. Normal wall thickness. There is normal systolic function with a visually estimated ejection fraction of 60 - 65%. Grade I diastolic dysfunction.    Right Ventricle: Normal right ventricular cavity size. Wall thickness is normal. Systolic function is normal.    Left Atrium: Left atrium is moderately dilated.    Aortic Valve: There is mild aortic valve sclerosis. Mildly restricted motion. There is mild stenosis. Aortic valve area by VTI is 1.6 cm². Aortic valve peak velocity is 2.3 m/s. Mean gradient is 10.2 mmHg. The dimensionless index is 0.52.    Mitral Valve: There is mild regurgitation.    Pulmonary Artery: There is mild pulmonary hypertension. The estimated pulmonary artery systolic pressure is 38 mmHg.    IVC/SVC: Normal venous pressure at 3  mmHg.      Most Recent Nuclear Stress Test Results  No results found for this or any previous visit.      Most Recent Cardiac PET Stress Test Results  No results found for this or any previous visit.      Most Recent Cardiovascular Angiogram results  Results for orders placed during the hospital encounter of 07/14/21    Cardiac catheterization    Conclusion  · Angiographically normal coronary arteries in a left dominant system  · The post-procedure left ventricular end diastolic pressure was 20.  · The ejection fraction was calculated to be 55%.    The procedure log was documented by Documenter: Zita Goodwin RN and verified by Corey Aguilar MD.    Date: 7/15/2021  Time: 2:30 PM      Other Most Recent Cardiology Results  Results for orders placed during the hospital encounter of 12/17/24    Holter monitor - 48 hour    Interpretation Summary    2 day cardiac event monitor    Predominant rhythm was sinus rhythm with an average heart rate    Rare PACs less than 1%    Very high PVC burden 31% which appeared unifocal. Several nonsustained VT episodes. Longest nonsustained VT episode 12 beats.    No evidence of AF/AFL    No clinically significant bradyarrhythmias    No patient triggered episodes        All pertinent data including labs, imaging, EKGs, and studies listed above were reviewed.  All of the patients ECG tracings since most recent visit were personally interpreted by this provider    ASSESSMENT:   Vee Navarro is a 59 y.o. female who presents to establish care with electrophysiology regarding symptomatic outflow tract PVC/VT.  EF normal.  No history of CAD.  We discussed treatment options of symptomatic PVCs include medications and/or EPS/RFA.  Limited medication options secondary to immunosuppressants.  She was recently started on beta blockers with significant PVCs in clinic today.  Ideally, would transition to verapamil or diltiazem and assess for improvement.  We will contact her kidney transplant  physician at [facility] to see if either of these would be viable options.  If these medications are contraindicated or ineffective then I would recommend either antiarrhythmic drug therapy (sotalol preferably) or EP study and ablation.    1. Essential hypertension  IN OFFICE EKG 12-LEAD (to [place])      2. Chronic congestive heart failure, unspecified heart failure type        3. PVC (premature ventricular contraction)  IN OFFICE EKG 12-LEAD (to [place])        PLAN FOR TREATMENT OF ABOVE DIAGNOSES:     Obtain an EKG in 1 week to assess heart rates with metoprolol.  If able, we will either increase metoprolol or transitioned to a non dihydropyridine calcium channel blocker if we hear back from Dr. Watson at that time  Continue Toprol-XL 25 mg daily for now      F/u 4-6 weeks    David Sands MD  Cardiac Electrophysiology    This note was partially generated using voice recognition and generative artificial intelligence software. While every effort has been made to ensure its accuracy, transcription errors may exist. Please reach out to me with any questions or if clarification is needed.

## 2025-02-06 ENCOUNTER — HOSPITAL ENCOUNTER (OUTPATIENT)
Dept: CARDIOLOGY | Facility: HOSPITAL | Age: 60
Discharge: HOME | End: 2025-02-06
Attending: INTERNAL MEDICINE
Payer: MEDICARE

## 2025-02-06 ENCOUNTER — DOCUMENTATION ONLY (OUTPATIENT)
Dept: CARDIOLOGY | Facility: CLINIC | Age: 60
End: 2025-02-06
Payer: MEDICARE

## 2025-02-06 NOTE — PROGRESS NOTES
Patient's ECG was reviewed which shows sinus bradycardia with a interpolated PVC. Recommend transition to verapamil 180 mg daily with repeat cardiac monitor to assess efficacy versus EP study and ablation. Discussed with Dr. Watson ([facility]). He is ok with starting verapamil, but will need to be informed to appropriately dose and follow the patient's Prograf levels. Tried calling patient to discuss. Left a voicemail.    David Sands MD  Cardiac electrophysiology      ADDENDUM:  Called and discussed with patient on the afternoon of 2/7/25. Will plan to initiate verapamil 120 mg daily on Monday 2/10/25. I will reach out to Dr. Watson on Monday to inform him of the medication change which may requiring alterations in dosing and monitoring of Prograf levels.

## 2025-02-10 DIAGNOSIS — I49.3 PVC'S (PREMATURE VENTRICULAR CONTRACTIONS): Primary | ICD-10-CM

## 2025-02-10 NOTE — TELEPHONE ENCOUNTER
----- Message from Price Sands MD sent at 2/7/2025  5:09 PM CST -----  Contact: pt  I spoke with the patient. Plan to start verapamil on Monday. Can we get a 3 day Zio monitor in 2 weeks from Monday 2/24/25. I will see her in follow up thereafter. Thanks  ----- Message -----  From: Carol Adkins LPN  Sent: 2/7/2025   4:13 PM CST  To: Price Sands MD      ----- Message -----  From: Julia Green  Sent: 2/7/2025   3:15 PM CST  To: Wicho Thrasher Staff    Type:  Patient Returning Call    Who Called: pt    Who Left Message for Patient: Dr Sands     Does the patient know what this is regarding?: results     Would the patient rather a call back or a response via [facility]?  Call back    Best Call Back Number: [phone]    Additional Information: please call to advise    Thanks

## 2025-02-11 RX ORDER — VERAPAMIL HYDROCHLORIDE 120 MG/1
120 CAPSULE, EXTENDED RELEASE ORAL DAILY
Qty: 30 CAPSULE | Refills: 11 | Status: SHIPPED | OUTPATIENT
Start: 2025-02-11 | End: 2025-02-13 | Stop reason: SDUPTHER

## 2025-02-13 ENCOUNTER — TELEPHONE (OUTPATIENT)
Dept: CARDIOLOGY | Facility: CLINIC | Age: 60
End: 2025-02-13
Payer: MEDICARE

## 2025-02-13 DIAGNOSIS — I49.3 PVC'S (PREMATURE VENTRICULAR CONTRACTIONS): ICD-10-CM

## 2025-02-13 RX ORDER — VERAPAMIL HYDROCHLORIDE 120 MG/1
120 CAPSULE, EXTENDED RELEASE ORAL DAILY
Qty: 30 CAPSULE | Refills: 11 | Status: SHIPPED | OUTPATIENT
Start: 2025-02-13 | End: 2026-02-13

## 2025-02-13 NOTE — TELEPHONE ENCOUNTER
----- Message from Cassia sent at 2/13/2025 10:12 AM CST -----  Type:  Needs Medical Advice    Who Called: patient    Pharmacy name and phone #:    [id number] - [place], [place] - [address]  [address]  [facility] [address]  Phone: [phone] Fax: [phone]    Would the patient rather a call back or a response via [facility]? Please call    Best Call Back Number: [phone]    Additional Information: Above pharmacy is out of the Verapamil  mg cap prescribed medication and will not have in stock until after 02/20...   Please call back to advise. Thanks!  
Changing pharmacy to [facility]  
,

## 2025-02-25 ENCOUNTER — PROCEDURE VISIT (OUTPATIENT)
Dept: UROLOGY | Facility: CLINIC | Age: 60
End: 2025-02-25
Payer: MEDICARE

## 2025-02-25 VITALS — WEIGHT: 126.75 LBS | BODY MASS INDEX: 21.12 KG/M2 | HEIGHT: 65 IN

## 2025-02-25 DIAGNOSIS — R31.0 GROSS HEMATURIA: Primary | ICD-10-CM

## 2025-02-25 LAB
BILIRUBIN, UA POC OHS: NEGATIVE
BLOOD, UA POC OHS: ABNORMAL
CLARITY, UA POC OHS: CLEAR
COLOR, UA POC OHS: YELLOW
GLUCOSE, UA POC OHS: NEGATIVE
KETONES, UA POC OHS: NEGATIVE
LEUKOCYTES, UA POC OHS: NEGATIVE
NITRITE, UA POC OHS: NEGATIVE
PH, UA POC OHS: 5
PROTEIN, UA POC OHS: 30
SPECIFIC GRAVITY, UA POC OHS: >=1.03
UROBILINOGEN, UA POC OHS: 0.2

## 2025-02-25 PROCEDURE — 81003 URINALYSIS AUTO W/O SCOPE: CPT | Mod: QW,S$GLB,, | Performed by: STUDENT IN AN ORGANIZED HEALTH CARE EDUCATION/TRAINING PROGRAM

## 2025-02-25 PROCEDURE — 52000 CYSTOURETHROSCOPY: CPT | Mod: S$GLB,,, | Performed by: STUDENT IN AN ORGANIZED HEALTH CARE EDUCATION/TRAINING PROGRAM

## 2025-02-25 RX ORDER — ESTRADIOL 0.1 MG/G
1 CREAM VAGINAL
Qty: 42.5 G | Refills: 3 | Status: SHIPPED | OUTPATIENT
Start: 2025-02-26

## 2025-02-25 NOTE — PROCEDURES
Cystoscopy    Date/Time: 2/25/2025 2:00 PM    Performed by: Armond Bhakta MD  Authorized by: Tiffany Rossi NP      Procedure:   Flexible cysto-urethroscopy    Pre Procedure Diagnosis:  gross hematuria    Post Procedure Diagnosis:  Same    Surgeon: Armond Bhakta MD     Anesthesia: 2% uro-jet lidocaine jelly for local analgesia    Procedure note:  The risks and benefits were explained and informed consent was obtained. 2% lidocaine urojet was used for local analgesia. The genitalia was prepped and draped in the sterile fashion.    The flexible scope was advanced into the urethra and into the bladder. There were no urethral strictures noted throughout the course of the urethra. There was mild labial adhesions and pain at the introitus.     The bladder was completely surveyed in a systematic fashion. The native ureteral orifices were identified in their normal orthotopic locations bilaterally and the transplant UO was seen at the left dome of the bladder. It appeared patient. There were no foreign bodies or stones. There were no trabeculations and no diverticula. No bladder tumors or lesions suspicious for malignancy were seen.     The urethra was evaluated on scope withdrawal and was normal.     The patient tolerated the procedure well without complication.     Findings in summary:  Normal cystoscopy    Assessment: CT urogram showed chronic mild hydronephrosis of the transplant UO, no concerning upper tract findings.   Cytology from 12/2024 normal    Plan:  Topical estrace

## 2025-03-11 ENCOUNTER — HOSPITAL ENCOUNTER (OUTPATIENT)
Dept: CARDIOLOGY | Facility: HOSPITAL | Age: 60
Discharge: HOME | End: 2025-03-11
Attending: INTERNAL MEDICINE
Payer: MEDICARE

## 2025-03-11 DIAGNOSIS — I49.3 PVC'S (PREMATURE VENTRICULAR CONTRACTIONS): ICD-10-CM

## 2025-03-11 PROCEDURE — 93242 EXT ECG>48HR<7D RECORDING: CPT | Mod: PO

## 2025-03-21 ENCOUNTER — TELEPHONE (OUTPATIENT)
Dept: CARDIOLOGY | Facility: CLINIC | Age: 60
End: 2025-03-21
Payer: MEDICARE

## 2025-03-21 NOTE — TELEPHONE ENCOUNTER
----- Message from [employer] sent at 3/21/2025  9:53 AM CDT -----  Type:  Sooner Appointment RequestCaller is requesting a sooner appointment.  Caller declined first available appointment listed below.  Caller will not accept being placed on the waitlist and is requesting a message be sent to doctor.Name of Caller:  Falguni is the first available appointment?  August 1stSymptoms:  patient wants a second opinion for condition, she stated the bottom of her heart beats 12 times before the top part of heart beats once Would the patient rather a call back or a response via [facility]? Call [facility] Call Back Number:  [phone] Information:

## 2025-03-23 NOTE — PROGRESS NOTES
Subjective:    Patient ID:  Vee Navarro is a 59 y.o. female who presents for second opinion and Shortness of Breath        HPI  PATIENT IS NEW TO ME, 2ND OPINION, REFERRED BY FAMILY, WAS SEEN BY MULTIPLE CARDIOLOGISTS INCLUDING DR. NAGY, DR. ALICIA ESTEVES,AND DR. KING FOR FREQUENT PVCS, BURDEN OF 30% STARTED ON BETA-BLOCKER TO BE UP TITRATED OR SWITCH TO CALCIUM CHANNEL BLOCKER, VERAPAMIL, WAS INTOLERANT ECHO NORMAL LV FUNCTION GRADE 1 DIASTOLIC DYSFUNCTION MODERATELY DILATED LEFT ATRIUM MILD MR AND MILD AS PA PRESSURE 38 MM OF MERCURY, CARDIAC CATHETERIZATION BY DR. BOWENS IN 2021 NORMAL CORONARY ARTERIES LVEDP 20, STATES MEDS FOR PVC'S NOT WORKING,GETS SOB EASILY, ASK IF I CAN PERFORM RADIOFREQUENCY ABLATION, SCARED WAS CRYING,  RECORDS REVIEWED, SEE ROS    Left Ventricle: The left ventricle is normal in size. Normal wall thickness. There is normal systolic function with a visually estimated ejection fraction of 60 - 65%. Grade I diastolic dysfunction.    Right Ventricle: Normal right ventricular cavity size. Wall thickness is normal. Systolic function is normal.    Left Atrium: Left atrium is moderately dilated.    Aortic Valve: There is mild aortic valve sclerosis. Mildly restricted motion. There is mild stenosis. Aortic valve area by VTI is 1.6 cm². Aortic valve peak velocity is 2.3 m/s. Mean gradient is 10.2 mmHg. The dimensionless index is 0.52.    Mitral Valve: There is mild regurgitation.    Pulmonary Artery: There is mild pulmonary hypertension. The estimated pulmonary artery systolic pressure is 38 mmHg.    IVC/SVC: Normal venous pressure at 3 mmHg.     Past Medical History:   Diagnosis Date    Acid reflux     Acute medial meniscus tear of right knee 04/2022    Anemia     Arthritis     Bacteremia     Chronic diastolic CHF (congestive heart failure)     Dialysis patient     resolved w/ transplant    Encounter for blood transfusion     End stage kidney disease     History of renal transplant  2021    [place] (hard of hearing)     Hypertension     Lumbar radiculopathy     Peritonitis     Renal failure     was on HD then PD then HD then kidney transplant in 2021 then ckd III    Short-term memory loss     Thyroid disease     Vitamin B12 deficiency      Past Surgical History:   Procedure Laterality Date    arm surgery Left     ARTHROSCOPY OF KNEE Right 7/26/2022    Procedure: ARTHROSCOPY, KNEE;  Surgeon: Tam Rosario MD;  Location: [facility];  Service: Orthopedics;  Laterality: Right;    BACK SURGERY  1982    CARPAL TUNNEL RELEASE Left 8/3/2020    Procedure: RELEASE, CARPAL TUNNEL;  Surgeon: MIKO Fishman MD;  Location: [facility];  Service: Orthopedics;  Laterality: Left;    COLONOSCOPY N/A 12/12/2017    Procedure: COLONOSCOPY;  Surgeon: Elpidio Casillas Jr., MD;  Location[facility];  Service: Endoscopy;  Laterality: N/A;    COLONOSCOPY N/A 1/25/2021    Procedure: COLONOSCOPY;  Surgeon: Elpidio Casillas Jr., MD;  Location: [facility];  Service: Endoscopy;  Laterality: N/A;    CORONARY ANGIOGRAPHY N/A 7/14/2021    Procedure: ANGIOGRAM, CORONARY ARTERY;  Surgeon: Corey Aguilar MD;  Location: [facility];  Service: Cardiovascular;  Laterality: N/A;    DIALYSIS FISTULA CREATION      EXCISION OF MEDIAL MENISCUS OF KNEE Right 7/26/2022    Procedure: MENISCECTOMY, KNEE, MEDIAL;  Surgeon: Tam Rosario MD;  Location: [facility];  Service: Orthopedics;  Laterality: Right;  partial     FOOT SURGERY  03/2019    FRACTURE SURGERY      KIDNEY STONE SURGERY  1983    KIDNEY TRANSPLANT Right 10/2021    KNEE ARTHROSCOPY Right 07/26/2022    right knee scope    LAPAROSCOPIC REVISION OF PROCEDURE INVOLVING PERITONEAL DIALYSIS CATHETER N/A 9/17/2018    Procedure: REVISION OF PROCEDURE INVOLVING PERITONEAL DIALYSIS CATHETER, LAPAROSCOPIC;  Surgeon: Kannan Piedra MD;  Location: [facility];  Service: General;  Laterality: N/A;    LEFT HEART CATHETERIZATION Left 7/14/2021    Procedure: Left heart cath;  Surgeon: Corey JEAN  MD Jeff;  Location: [facility] C[facility];  Service: Cardiovascular;  Laterality: Left;    NEUROMA SURGERY Left     knee    OPEN REDUCTION AND INTERNAL FIXATION (ORIF) OF FRACTURE OF DISTAL RADIUS Left 8/3/2020    Procedure: ORIF, FRACTURE,/ BONE GRAFT/ ORIF Scaphoid;  Surgeon: MIKO Fishman MD;  Location: [facility];  Service: Orthopedics;  Laterality: Left;  Dr. Fishman completed his surgery at 1159    OTHER SURGICAL HISTORY      peritoneal catheter move X2    PERITONEAL CATHETER REMOVAL N/A 2019    Procedure: REMOVAL, CATHETER, DIALYSIS, PERITONEAL;  Surgeon: Edilson Sherman MD;  Location: [facility] OR;  Service: General;  Laterality: N/A;    PLACEMENT PERITONEAL DIALYSIS CATHETER       Family History   Problem Relation Name Age of Onset    Mental illness Brother      Heart disease Maternal Grandmother      Cancer Maternal Grandfather      Stroke Paternal Grandmother      Cancer Paternal Grandfather      No Known Problems Brother      Arthritis Mother      Hypertension Father      Suicide Father       Social History     Socioeconomic History    Marital status: Single   Tobacco Use    Smoking status: Former     Current packs/day: 0.00     Average packs/day: 0.7 packs/day for 42.3 years (27.9 ttl pk-yrs)     Types: Cigarettes     Start date:      Quit date: 2020     Years since quittin.8     Passive exposure: Never    Smokeless tobacco: Never    Tobacco comments:     quit a couple of times previously   Substance and Sexual Activity    Alcohol use: Yes     Comment: occasionally    Drug use: No    Sexual activity: Not Currently     Partners: Male     Social Drivers of Health     Financial Resource Strain: Medium Risk (2024)    Overall Financial Resource Strain (CARDIA)     Difficulty of Paying Living Expenses: Somewhat hard   Food Insecurity: No Food Insecurity (2024)    Hunger Vital Sign     Worried About Running Out of Food in the Last Year: Never true     Ran Out of Food in the Last Year: Never  "true   Transportation Needs: No Transportation Needs (6/21/2024)    TRANSPORTATION NEEDS     Transportation : No   Physical Activity: Sufficiently Active (6/25/2024)    Exercise Vital Sign     Days of Exercise per Week: 3 days     Minutes of Exercise per Session: 90 min   Stress: Stress Concern Present (6/25/2024)    Citizen of [place] [employer] - Occupational Stress Questionnaire     Feeling of Stress : Very much   Housing Stability: Unknown (6/25/2024)    Housing Stability Vital Sign     Unable to Pay for Housing in the Last Year: No     Homeless in the Last Year: No       Review of patient's allergies indicates:   Allergen Reactions    Morphine      Other reaction(s): rash, vomiting     Current Medications[1]    Review of Systems   Constitutional: Negative for decreased appetite, diaphoresis, night sweats and weight gain.   HENT:  Negative for ear discharge, nosebleeds and odynophagia.    Eyes:  Negative for double vision and photophobia.   Cardiovascular:  Positive for dyspnea on exertion, irregular heartbeat and leg swelling. Negative for chest pain, claudication, cyanosis, near-syncope, orthopnea, paroxysmal nocturnal dyspnea and syncope.   Respiratory:  Positive for shortness of breath. Negative for cough, hemoptysis and wheezing.    Hematologic/Lymphatic: Negative for adenopathy.   Skin:  Negative for flushing and skin cancer.   Musculoskeletal:  Negative for back pain, gout and neck pain.   Gastrointestinal:  Negative for abdominal pain, heartburn, hematemesis and hematochezia.   Genitourinary:  Negative for bladder incontinence, hesitancy and nocturia.   Neurological:  Negative for focal weakness, headaches, light-headedness and paresthesias.   Psychiatric/Behavioral:  Negative for memory loss and substance abuse.         Objective:      Vitals:    03/24/25 1014   BP: 117/60   Pulse: (!) 45   Weight: 57.9 kg (127 lb 10.3 oz)   Height: 5' 6" (1.676 m)   PainSc: 0-No pain     Body mass index is " 20.6 kg/m².    Physical Exam  Nursing note reviewed.   Constitutional:       General: She is not in acute distress.     Appearance: She is well-developed. She is not diaphoretic.   HENT:      Head: Normocephalic and atraumatic.   Eyes:      General: No scleral icterus.        Right eye: No discharge.         Left eye: No discharge.   Neck:      Vascular: No carotid bruit.   Cardiovascular:      Rate and Rhythm: Normal rate and regular rhythm. Frequent Extrasystoles are present.     Pulses: Normal pulses and intact distal pulses.           Carotid pulses are 2+ on the right side and 2+ on the left side.       Radial pulses are 2+ on the right side and 2+ on the left side.        Posterior tibial pulses are 2+ on the right side and 2+ on the left side.      Heart sounds: S1 normal and S2 normal. Murmur heard.      Systolic murmur is present with a grade of 1/6 at the upper right sternal border and lower left sternal border.      No gallop. No S3 or S4 sounds.   Pulmonary:      Effort: Pulmonary effort is normal.      Breath sounds: Normal breath sounds and air entry. No wheezing or rales.   Abdominal:      General: Bowel sounds are normal. There is no distension.      Palpations: Abdomen is soft.      Tenderness: There is no abdominal tenderness.   Musculoskeletal:         General: No deformity. Normal range of motion.      Cervical back: Normal range of motion and neck supple.   Skin:     General: Skin is warm and dry.      Findings: No erythema or rash.   Neurological:      General: No focal deficit present.      Mental Status: She is alert and oriented to person, place, and time.   Psychiatric:         Mood and Affect: Mood is anxious.         Speech: Speech normal.         Behavior: Behavior normal.                 ..    Chemistry        Component Value Date/Time     02/21/2025 1011    K 5.0 02/21/2025 1011     02/21/2025 1011    CO2 24 02/21/2025 1011    BUN 34 (H) 02/21/2025 1011    CREATININE 1.19  02/21/2025 1011    GLU 89 02/21/2025 1011        Component Value Date/Time    CALCIUM 10.0 02/21/2025 1011    ALKPHOS 72 12/04/2024 1119    AST 18 12/04/2024 1119    ALT 10 12/04/2024 1119    BILITOT 0.5 12/04/2024 1119    ESTGFRAFRICA 51 (A) 07/26/2022 0630    EGFRNONAA 44 (A) 07/26/2022 0630            ..  Lab Results   Component Value Date    CHOL 165 06/20/2024    CHOL 182 03/11/2023     Lab Results   Component Value Date    HDL 58 06/20/2024    HDL 58 03/11/2023     Lab Results   Component Value Date    LDLCALC 75.8 06/20/2024    LDLCALC 102.4 03/11/2023     Lab Results   Component Value Date    TRIG 156 (H) 06/20/2024    TRIG 108 03/11/2023     Lab Results   Component Value Date    CHOLHDL 35.2 06/20/2024    CHOLHDL 31.9 03/11/2023     ..  Lab Results   Component Value Date    WBC 6.15 12/17/2024    HGB 14.2 12/17/2024    HCT 44.9 12/17/2024    MCV 91 12/17/2024     12/17/2024       Test(s) Reviewed  I have reviewed the following in detail:  [x] Stress test   [] Angiography   [x] Echocardiogram   [x] Labs   [] Other:       Assessment:         ICD-10-CM ICD-9-CM   1. SOB (shortness of breath)  R06.02 786.05   2. Mitral regurgitation and aortic stenosis  I08.0 396.2   3. Frequent PVCs  I49.3 427.69   4. Chronic diastolic heart failure  I50.32 428.32   5. Essential hypertension  I10 401.9     Problem List Items Addressed This Visit          Pulmonary    SOB (shortness of breath) - Primary    Relevant Orders    Nuclear Stress - Cardiology Interpreted       Cardiac/Vascular    Essential hypertension    Chronic diastolic heart failure    Relevant Orders    Nuclear Stress - Cardiology Interpreted    Frequent PVCs    Relevant Orders    Nuclear Stress - Cardiology Interpreted    Mitral regurgitation and aortic stenosis    Overview   MILD             Plan:     STRESS TEST, TO ASSESS FOR ISCHEMIA OFF MEDS, DAILY WEIGHT 2 LB PER DAY 5 LB PER WEEK RULE EXPLAINED,    ALL CV CLINICALLY RELATIVELY STABLE, ASSESS  EQUIVALENT ANGINA, NO OVERT HF, NO TIA, RECURRENT CLINICAL ARRHYTHMIA WITH HIGH PVCS BURDEN OF 30%, HAS REPEAT MONITOR,CONTINUE CURRENT MEDS, EDUCATION, DIET, EXERCISE , KEEP APPOINTMENT WITH EP EXPLAINED THAT IF ABLATION NEEDS TO BE DONE IT HAS TO BE BY ELECTROPHYSIOLOGIST, PATIENT SYMPTOMS HAVE BEEN RELATIVELY STABLE EF IS NOT DECREASED SO THERE IS NO URGENCY, RETURN TO CLINIC IN 3 MONTHS        SOB (shortness of breath)  -     Nuclear Stress - Cardiology Interpreted; Future    Mitral regurgitation and aortic stenosis  Comments:  MILD    Frequent PVCs  -     Nuclear Stress - Cardiology Interpreted; Future    Chronic diastolic heart failure  -     Nuclear Stress - Cardiology Interpreted; Future    Essential hypertension  Comments:  OK OF MEDS    RTC Low level/low impact aerobic exercise 5x's/wk. Heart healthy diet and risk factor modification.    See labs and med orders.    Aerobic exercise 5x's/wk. Heart healthy diet and risk factor modification.    See labs and med orders.             [1]   Current Outpatient Medications:     alendronate (FOSAMAX) 70 MG tablet, 12, Disp: , Rfl:     aspirin (ECOTRIN) 81 MG EC tablet, Take 81 mg by mouth once daily., Disp: , Rfl:     cinacalcet (SENSIPAR) 60 MG Tab, Take 60 mg by mouth daily with breakfast., Disp: , Rfl:     estradioL (ESTRACE) 0.01 % (0.1 mg/gram) vaginal cream, Place 1 g vaginally 3 (three) times a week. Place 1 fingertip of cream at urethral opening (where you urinate from) and external vagina 3 times per week. Please do not use plastic applicator, Disp: 42.5 g, Rfl: 3    multivit with minerals/lutein (MULTIVITAMIN 50 PLUS ORAL), Take 1 tablet by mouth once daily., Disp: , Rfl:     mycophenolate sodium 360 MG TbEC, Take 360 mg by mouth 2 (two) times daily., Disp: , Rfl:     mycophenolate sodium 360 MG TbEC, Take 1 tablet by mouth 2 (two) times daily., Disp: , Rfl:     ondansetron (ZOFRAN-ODT) 4 MG TbDL, DISSOLVE 1 TABLET BY MOUTH EVERY 8 HOURS AS NEEDED FOR  NAUSEA, Disp: 30 tablet, Rfl: 2    pantoprazole (PROTONIX) 40 MG tablet, TAKE 1 TABLET BY MOUTH 2 TIMES DAILY, Disp: 60 tablet, Rfl: 11    tacrolimus (PROGRAF) 1 MG Cap, Take 3 mg by mouth 3 (three) times daily. One am, 2 pm, Disp: , Rfl:   No current facility-administered medications for this visit.    Facility-Administered Medications Ordered in Other Visits:     0.9%  NaCl infusion, 20 mL/hr, Intravenous, Continuous, Yeimy Hickman MD, Last Rate: 20 mL/hr at 09/17/18 0900, 20 mL/hr at 09/17/18 0900    0.9%  NaCl infusion, , Intravenous, Continuous, Albin Javier MD, Stopped at 08/03/20 1242    lidocaine (PF) 10 mg/ml (1%) injection 2 mg, 0.2 mL, Intradermal, Once, Albin Javier MD

## 2025-03-24 ENCOUNTER — RESULTS FOLLOW-UP (OUTPATIENT)
Dept: CARDIOLOGY | Facility: CLINIC | Age: 60
End: 2025-03-24

## 2025-03-24 ENCOUNTER — OFFICE VISIT (OUTPATIENT)
Dept: CARDIOLOGY | Facility: CLINIC | Age: 60
End: 2025-03-24
Payer: MEDICARE

## 2025-03-24 VITALS
WEIGHT: 127.63 LBS | SYSTOLIC BLOOD PRESSURE: 117 MMHG | HEIGHT: 66 IN | BODY MASS INDEX: 20.51 KG/M2 | DIASTOLIC BLOOD PRESSURE: 60 MMHG | HEART RATE: 45 BPM

## 2025-03-24 DIAGNOSIS — I08.0 MITRAL REGURGITATION AND AORTIC STENOSIS: ICD-10-CM

## 2025-03-24 DIAGNOSIS — I49.3 FREQUENT PVCS: Chronic | ICD-10-CM

## 2025-03-24 DIAGNOSIS — I10 ESSENTIAL HYPERTENSION: ICD-10-CM

## 2025-03-24 DIAGNOSIS — I50.32 CHRONIC DIASTOLIC HEART FAILURE: Chronic | ICD-10-CM

## 2025-03-24 DIAGNOSIS — R06.02 SOB (SHORTNESS OF BREATH): Primary | ICD-10-CM

## 2025-03-24 PROCEDURE — 1159F MED LIST DOCD IN RCRD: CPT | Mod: CPTII,S$GLB,, | Performed by: INTERNAL MEDICINE

## 2025-03-24 PROCEDURE — 3078F DIAST BP <80 MM HG: CPT | Mod: CPTII,S$GLB,, | Performed by: INTERNAL MEDICINE

## 2025-03-24 PROCEDURE — 3008F BODY MASS INDEX DOCD: CPT | Mod: CPTII,S$GLB,, | Performed by: INTERNAL MEDICINE

## 2025-03-24 PROCEDURE — 99999 PR PBB SHADOW E&M-EST. PATIENT-LVL III: CPT | Mod: PBBFAC,,, | Performed by: INTERNAL MEDICINE

## 2025-03-24 PROCEDURE — 3074F SYST BP LT 130 MM HG: CPT | Mod: CPTII,S$GLB,, | Performed by: INTERNAL MEDICINE

## 2025-03-24 PROCEDURE — 99204 OFFICE O/P NEW MOD 45 MIN: CPT | Mod: S$GLB,,, | Performed by: INTERNAL MEDICINE

## 2025-03-24 RX ORDER — MYCOPHENOLIC ACID 360 MG/1
1 TABLET, DELAYED RELEASE ORAL 2 TIMES DAILY
COMMUNITY
Start: 2025-01-13 | End: 2026-01-13

## 2025-03-24 NOTE — PROGRESS NOTES
Discussed results with patient.  Would recommend either uptitration of verapamil or ablation.  We will discuss further in clinic this week.

## 2025-04-15 ENCOUNTER — TELEPHONE (OUTPATIENT)
Dept: CARDIOLOGY | Facility: CLINIC | Age: 60
End: 2025-04-15
Payer: MEDICARE

## 2025-04-15 NOTE — TELEPHONE ENCOUNTER
----- Message from Cassia sent at 4/15/2025 12:35 PM CDT -----  Type:  Needs Medical AdviceWho Called: ptWould the patient rather a call back or a response via [facility]? Please callBest Call Back Number: [phone] Information: pt has questions regarding meds Dr took her off of and would like to get in sooner than 05/09   Please call back to advise. Thanks!

## 2025-04-15 NOTE — TELEPHONE ENCOUNTER
Spoke with pt and she said she wasn't sure about the medication the doctor took her off, she thought he was going to up the dosage.  Pt wants to know what should she do about the medicine

## 2025-05-09 ENCOUNTER — OFFICE VISIT (OUTPATIENT)
Dept: CARDIOLOGY | Facility: CLINIC | Age: 60
End: 2025-05-09
Payer: MEDICARE

## 2025-05-09 VITALS
BODY MASS INDEX: 19.7 KG/M2 | SYSTOLIC BLOOD PRESSURE: 138 MMHG | WEIGHT: 122.56 LBS | HEIGHT: 66 IN | HEART RATE: 73 BPM | DIASTOLIC BLOOD PRESSURE: 69 MMHG

## 2025-05-09 DIAGNOSIS — I10 ESSENTIAL HYPERTENSION: ICD-10-CM

## 2025-05-09 DIAGNOSIS — I08.0 MITRAL REGURGITATION AND AORTIC STENOSIS: ICD-10-CM

## 2025-05-09 DIAGNOSIS — T82.590A MECHANICAL COMPLICATION OF ARTERIOVENOUS FISTULA SURGICALLY CREATED, INITIAL ENCOUNTER: ICD-10-CM

## 2025-05-09 DIAGNOSIS — I49.3 FREQUENT PVCS: Primary | ICD-10-CM

## 2025-05-09 DIAGNOSIS — I50.32 CHRONIC DIASTOLIC HEART FAILURE: ICD-10-CM

## 2025-05-09 DIAGNOSIS — I50.9 CHRONIC CONGESTIVE HEART FAILURE, UNSPECIFIED HEART FAILURE TYPE: ICD-10-CM

## 2025-05-09 PROCEDURE — 93005 ELECTROCARDIOGRAM TRACING: CPT | Mod: PO

## 2025-05-09 PROCEDURE — 99999 PR PBB SHADOW E&M-EST. PATIENT-LVL III: CPT | Mod: PBBFAC,,, | Performed by: INTERNAL MEDICINE

## 2025-05-09 NOTE — PROGRESS NOTES
SUBJECTIVE:    Patient ID:  Vee Navarro is a 59 y.o. female who presents for follow of PVCs.    Medical history:  Outflow tract PVC/VT  HTN   HFpEF   Pulmonary hypertension (PA systolic pressure 38 mm Hg)  Mild aortic stenosis   Polycystic kidney disease s/p kidney transplant 2021     Cardiologist: Dr. Mota     Patient has history of PVC and nonsustained VT.  Recently started on Toprol-XL 25 mg daily.  She underwent a left heart catheterization 7/21 which showed angiographically normal coronaries.  Echocardiogram 12/24 shows a normal EF 60-65% and mild aortic stenosis.     48 hour Holter with unifocal PVCs 31%.  Nonsustained VT up to 12 beats.     Admit 6/24 with volume overload.  Echo at that time normal EF with CVP 8.  She is given a 1 time dose of Lasix with auto diuresis and recovery after.  She is not on diuretics chronically.     She is followed by Dr. Watson at [facility].  She is on Prograf and CellCept for immunosuppression.     1/30/25  Has been on metoprolol for 1-2 days.  Has not noted any significant changes.  Patient reports palpitations with PVCs.  Denies angina.  Denies CHF symptoms today.    05/09/2025  Evaluated in the ED 03/19/2025 verapamil was increased.  At some point her verapamil was discontinued.  She was not having issues tolerating the medication.  Issues with hematuria currently being evaluated by Urology  Reports palpitations with PVCs.  Cardiac event monitor 03/11/2025:  PVC burden 18%.  Nonsustained VT up to 8 beats.    I personally reviewed the ECG/telemetry strip today. My interpretation is sinus rhythm with frequent PVCs and couplets.    Past Medical History:   Diagnosis Date    Acid reflux     Acute medial meniscus tear of right knee 04/2022    Anemia     Arthritis     Bacteremia     Chronic diastolic CHF (congestive heart failure)     Dialysis patient     resolved w/ transplant    Encounter for blood transfusion     End stage kidney disease     History of renal transplant  2021    Tribal (hard of hearing)     Hypertension     Lumbar radiculopathy     Peritonitis     Renal failure     was on HD then PD then HD then kidney transplant in 2021 then ckd III    Short-term memory loss     Thyroid disease     Vitamin B12 deficiency        Past Surgical History:   Procedure Laterality Date    arm surgery Left     ARTHROSCOPY OF KNEE Right 7/26/2022    Procedure: ARTHROSCOPY, KNEE;  Surgeon: Tam Rosario MD;  Location: [facility];  Service: Orthopedics;  Laterality: Right;    BACK SURGERY  1982    CARPAL TUNNEL RELEASE Left 8/3/2020    Procedure: RELEASE, CARPAL TUNNEL;  Surgeon: MIKO Fishman MD;  Location: [facility];  Service: Orthopedics;  Laterality: Left;    COLONOSCOPY N/A 12/12/2017    Procedure: COLONOSCOPY;  Surgeon: Elpidio Casillas Jr., MD;  Location: [facility];  Service: Endoscopy;  Laterality: N/A;    COLONOSCOPY N/A 1/25/2021    Procedure: COLONOSCOPY;  Surgeon: Elpidio Casillas Jr., MD;  Location: [facility];  Service: Endoscopy;  Laterality: N/A;    CORONARY ANGIOGRAPHY N/A 7/14/2021    Procedure: ANGIOGRAM, CORONARY ARTERY;  Surgeon: Corey Aguilar MD;  Location: [facility];  Service: Cardiovascular;  Laterality: N/A;    DIALYSIS FISTULA CREATION      EXCISION OF MEDIAL MENISCUS OF KNEE Right 7/26/2022    Procedure: MENISCECTOMY, KNEE, MEDIAL;  Surgeon: Tam Rosario MD;  Location: [facility];  Service: Orthopedics;  Laterality: Right;  partial     FOOT SURGERY  03/2019    FRACTURE SURGERY      KIDNEY STONE SURGERY  1983    KIDNEY TRANSPLANT Right 10/2021    KNEE ARTHROSCOPY Right 07/26/2022    right knee scope    LAPAROSCOPIC REVISION OF PROCEDURE INVOLVING PERITONEAL DIALYSIS CATHETER N/A 9/17/2018    Procedure: REVISION OF PROCEDURE INVOLVING PERITONEAL DIALYSIS CATHETER, LAPAROSCOPIC;  Surgeon: Kannan Piedra MD;  Location: [facility];  Service: General;  Laterality: N/A;    LEFT HEART CATHETERIZATION Left 7/14/2021    Procedure: Left heart cath;  Surgeon: Corey JEAN  MD Jeff;  Location: [email or web address] CATH;  Service: Cardiovascular;  Laterality: Left;    NEUROMA SURGERY Left     knee    OPEN REDUCTION AND INTERNAL FIXATION (ORIF) OF FRACTURE OF DISTAL RADIUS Left 8/3/2020    Procedure: ORIF, FRACTURE,/ BONE GRAFT/ ORIF Scaphoid;  Surgeon: MIKO Fishman MD;  Location: [facility];  Service: Orthopedics;  Laterality: Left;  Dr. Fishman completed his surgery at 1159    OTHER SURGICAL HISTORY      peritoneal catheter move X2    PERITONEAL CATHETER REMOVAL N/A 2019    Procedure: REMOVAL, CATHETER, DIALYSIS, PERITONEAL;  Surgeon: Edilson Sherman MD;  Location: [email or web address] OR;  Service: General;  Laterality: N/A;    PLACEMENT PERITONEAL DIALYSIS CATHETER         Family History   Problem Relation Name Age of Onset    Mental illness Brother      Heart disease Maternal Grandmother      Cancer Maternal Grandfather      Stroke Paternal Grandmother      Cancer Paternal Grandfather      No Known Problems Brother      Arthritis Mother      Hypertension Father      Suicide Father         Social History     Socioeconomic History    Marital status: Single   Tobacco Use    Smoking status: Former     Current packs/day: 0.00     Average packs/day: 0.7 packs/day for 42.3 years (27.9 ttl pk-yrs)     Types: Cigarettes     Start date:      Quit date: 2020     Years since quittin.0     Passive exposure: Never    Smokeless tobacco: Never    Tobacco comments:     quit a couple of times previously   Substance and Sexual Activity    Alcohol use: Yes     Comment: occasionally    Drug use: No    Sexual activity: Not Currently     Partners: Male     Social Drivers of Health     Financial Resource Strain: Medium Risk (2024)    Overall Financial Resource Strain (CARDIA)     Difficulty of Paying Living Expenses: Somewhat hard   Food Insecurity: No Food Insecurity (2024)    Hunger Vital Sign     Worried About Running Out of Food in the Last Year: Never true     Ran Out of Food in the Last Year:  Never true   Transportation Needs: No Transportation Needs (6/21/2024)    TRANSPORTATION NEEDS     Transportation : No   Physical Activity: Sufficiently Active (6/25/2024)    Exercise Vital Sign     Days of Exercise per Week: 3 days     Minutes of Exercise per Session: 90 min   Stress: Stress Concern Present (6/25/2024)    [employer] - Occupational Stress Questionnaire     Feeling of Stress : Very much   Housing Stability: Unknown (6/25/2024)    Housing Stability Vital Sign     Unable to Pay for Housing in the Last Year: No     Homeless in the Last Year: No       Review of patient's allergies indicates:   Allergen Reactions    Morphine      Other reaction(s): rash, vomiting       ROS       OBJECTIVE:   There were no vitals filed for this visit.    BP Readings from Last 5 Encounters:   03/24/25 117/60   03/19/25 123/75   02/11/25 110/70   01/30/25 122/73   01/27/25 132/73        Physical Exam        Current Outpatient Medications   Medication Instructions    alendronate (FOSAMAX) 70 mg, Oral, Every 7 days    aspirin (ECOTRIN) 81 mg, Daily    cinacalcet (SENSIPAR) 60 mg, With breakfast    estradioL (ESTRACE) 1 g, Vaginal, Three times weekly, Place 1 fingertip of cream at urethral opening (where you urinate from) and external vagina 3 times per week. Please do not use plastic applicator    multivit with minerals/lutein (MULTIVITAMIN 50 PLUS ORAL) 1 tablet, Daily    mycophenolate sodium 360 MG TbEC 1 tablet, 2 times daily    mycophenolate sodium 360 mg, 2 times daily    ondansetron (ZOFRAN-ODT) 4 MG TbDL DISSOLVE 1 TABLET BY MOUTH EVERY 8 HOURS AS NEEDED FOR NAUSEA    pantoprazole (PROTONIX) 40 MG tablet TAKE 1 TABLET BY MOUTH 2 TIMES DAILY    tacrolimus (PROGRAF) 3 mg, 3 times daily       Lipid Panel:   Lab Results   Component Value Date    CHOL 165 06/20/2024    HDL 58 06/20/2024    LDLCALC 75.8 06/20/2024    TRIG 156 (H) 06/20/2024    CHOLHDL 35.2 06/20/2024       The 10-year ASCVD risk  score (Laya BRYANT, et al., 2019) is: 2.6%    Values used to calculate the score:      Age: 59 years      Sex: Female      Is Non- : No      Diabetic: No      Tobacco smoker: No      Systolic Blood Pressure: 117 mmHg      Is BP treated: No      HDL Cholesterol: 58 mg/dL      Total Cholesterol: 165 mg/dL    Most Recent EKG Results  Results for orders placed or performed during the hospital encounter of 03/19/25   EKG 12-lead    Collection Time: 03/19/25 11:23 AM   Result Value Ref Range    QRS Duration 82 ms    [facility]HS QTC Calculation 446 ms    Narrative    Test Reason : R52,    Vent. Rate : 105 BPM     Atrial Rate : 156 BPM     P-R Int : 178 ms          QRS Dur :  82 ms      QT Int : 338 ms       P-R-T Axes :  73  43  72 degrees    QTcB Int : 446 ms    Sinus tachycardia with frequent and consecutive Premature ventricular  complexes  Possible Left atrial enlargement  Septal infarct ,age undetermined  Abnormal ECG  When compared with ECG of 06-Feb-2025 09:38,  Vent. rate has increased by  41 bpm  Septal infarct is now Present  Confirmed by Jeffry Rendon ([id number]) on 3/21/2025 2:51:43 PM    Referred By: AAAREFERRAL SELF           Confirmed By: Jeffry Rendon       Most Recent Echocardiogram Results  Results for orders placed during the hospital encounter of 12/17/24    Echo    Interpretation Summary    Left Ventricle: The left ventricle is normal in size. Normal wall thickness. There is normal systolic function with a visually estimated ejection fraction of 60 - 65%. Grade I diastolic dysfunction.    Right Ventricle: Normal right ventricular cavity size. Wall thickness is normal. Systolic function is normal.    Left Atrium: Left atrium is moderately dilated.    Aortic Valve: There is mild aortic valve sclerosis. Mildly restricted motion. There is mild stenosis. Aortic valve area by VTI is 1.6 cm². Aortic valve peak velocity is 2.3 m/s. Mean gradient is 10.2 mmHg. The dimensionless index is 0.52.    Mitral  Valve: There is mild regurgitation.    Pulmonary Artery: There is mild pulmonary hypertension. The estimated pulmonary artery systolic pressure is 38 mmHg.    IVC/SVC: Normal venous pressure at 3 mmHg.      Most Recent Nuclear Stress Test Results  No results found for this or any previous visit.      Most Recent Cardiac PET Stress Test Results  No results found for this or any previous visit.      Most Recent Cardiovascular Angiogram results  Results for orders placed during the hospital encounter of 07/14/21    Cardiac catheterization    Conclusion  · Angiographically normal coronary arteries in a left dominant system  · The post-procedure left ventricular end diastolic pressure was 20.  · The ejection fraction was calculated to be 55%.    The procedure log was documented by Documenter: Zita Goodwin RN and verified by Corey Aguilar MD.    Date: 7/15/2021  Time: 2:30 PM      Other Most Recent Cardiology Results  Results for orders placed during the hospital encounter of 03/19/25    Cardiac monitoring strips        All pertinent data including labs, imaging, EKGs, and studies listed above were reviewed.  All of the patients ECG tracings since most recent visit were personally interpreted by this provider    ASSESSMENT:   Vee Navarro is a 59 y.o. female who presents for follow of PVCs.  Most recent EF normal.  Is symptomatic from PVCs.  She had mild improvement of PVC burden with verapamil but still significantly elevated.    The risks, benefits and alternatives of the procedure were explained to the patient, patient's family and/or surrogate decision maker. Risks include (but not limited to) bleeding, hematoma, infection, pain, vascular damage, damage to structures surrounding the vasculature, myocardial damage [perforation, valvular damage], cardiac tamponade, coronary artery injury, contrast-induced nephropathy, CVA, MI, and death. Patient is understanding that repeat ablations may be required. All  questions were answered. Patient is understanding of these risks.    1. Frequent PVCs        2. Mechanical complication of arteriovenous fistula surgically created, initial encounter  IN OFFICE EKG 12-LEAD (to Muse)      3. Essential hypertension  IN OFFICE EKG 12-LEAD (to Muse)      4. Chronic congestive heart failure, unspecified heart failure type  IN OFFICE EKG 12-LEAD (to Muse)      5. Chronic diastolic heart failure        6. Mitral regurgitation and aortic stenosis          PLAN FOR TREATMENT OF ABOVE DIAGNOSES:     Patient would like to proceed with PVC ablation; however, she is having issues with gross hematuria which is being evaluated by Urology. Patient to follow up with me following urologic evaluation for hematuria.  We will hold off on ablation until hematuria is stable as it will require heparin during the case.  Patient will need an echocardiogram prior to PVC ablation if performed  Restart verapamil 180 mg daily once we get approval from her transplant nephrologist.  Patient to send me contact information for her transplant nephrologist.  Continue aspirin 81 mg daily        F/u we will be determined pending hematuria evaluation      David Sands MD  Cardiac Electrophysiology    This note was partially generated using voice recognition and generative artificial intelligence software. While every effort has been made to ensure its accuracy, transcription errors may exist. Please reach out to me with any questions or if clarification is needed.

## 2025-05-15 ENCOUNTER — PATIENT MESSAGE (OUTPATIENT)
Dept: PSYCHIATRY | Facility: CLINIC | Age: 60
End: 2025-05-15
Payer: MEDICARE

## 2025-05-21 ENCOUNTER — TELEPHONE (OUTPATIENT)
Dept: CARDIOLOGY | Facility: CLINIC | Age: 60
End: 2025-05-21
Payer: MEDICARE

## 2025-05-21 ENCOUNTER — PATIENT MESSAGE (OUTPATIENT)
Dept: CARDIOLOGY | Facility: CLINIC | Age: 60
End: 2025-05-21
Payer: MEDICARE

## 2025-05-21 NOTE — TELEPHONE ENCOUNTER
Pt needing CV risk and medication instructions for cysto BRPG. Requesting to hold ASA 81mg.     Fax: [phone]

## 2025-05-29 ENCOUNTER — TELEPHONE (OUTPATIENT)
Dept: CARDIOLOGY | Facility: CLINIC | Age: 60
End: 2025-05-29
Payer: MEDICARE

## 2025-05-29 NOTE — TELEPHONE ENCOUNTER
----- Message from Ree sent at 5/29/2025  9:53 AM CDT -----  Name of Who is Calling: Pt  What is the request in detail:Pt would like a call back in regards to general question and also [place] an procedure. Please advise thank you   Can the clinic reply by [facility]:no  [facility]:Telephone Information:[employer]          [phone]

## 2025-06-02 ENCOUNTER — TELEPHONE (OUTPATIENT)
Dept: CARDIOLOGY | Facility: CLINIC | Age: 60
End: 2025-06-02
Payer: MEDICARE

## 2025-06-03 DIAGNOSIS — I49.3 PVC'S (PREMATURE VENTRICULAR CONTRACTIONS): Primary | ICD-10-CM

## 2025-06-03 RX ORDER — SODIUM CHLORIDE 9 MG/ML
INJECTION, SOLUTION INTRAVENOUS ONCE
OUTPATIENT
Start: 2025-06-03 | End: 2025-06-03

## 2025-06-03 RX ORDER — SODIUM CHLORIDE 0.9 % (FLUSH) 0.9 %
10 SYRINGE (ML) INJECTION
Status: SHIPPED | OUTPATIENT
Start: 2025-06-03

## 2025-06-04 ENCOUNTER — TELEPHONE (OUTPATIENT)
Dept: SURGERY | Facility: CLINIC | Age: 60
End: 2025-06-04
Payer: MEDICARE

## 2025-06-05 ENCOUNTER — OFFICE VISIT (OUTPATIENT)
Dept: URGENT CARE | Facility: CLINIC | Age: 60
End: 2025-06-05
Payer: MEDICARE

## 2025-06-05 VITALS
TEMPERATURE: 98 F | OXYGEN SATURATION: 97 % | RESPIRATION RATE: 16 BRPM | HEART RATE: 66 BPM | HEIGHT: 66 IN | BODY MASS INDEX: 19.14 KG/M2 | DIASTOLIC BLOOD PRESSURE: 67 MMHG | SYSTOLIC BLOOD PRESSURE: 127 MMHG

## 2025-06-05 DIAGNOSIS — N18.9 CHRONIC KIDNEY DISEASE, UNSPECIFIED CKD STAGE: ICD-10-CM

## 2025-06-05 DIAGNOSIS — Z94.0 HX OF KIDNEY TRANSPLANT: ICD-10-CM

## 2025-06-05 DIAGNOSIS — M79.671 PAIN OF RIGHT HEEL: Primary | ICD-10-CM

## 2025-06-05 PROCEDURE — 73650 X-RAY EXAM OF HEEL: CPT | Mod: RT,S$GLB,, | Performed by: RADIOLOGY

## 2025-06-06 ENCOUNTER — TELEPHONE (OUTPATIENT)
Dept: SURGERY | Facility: CLINIC | Age: 60
End: 2025-06-06
Payer: MEDICARE

## 2025-06-18 ENCOUNTER — TELEPHONE (OUTPATIENT)
Dept: SURGERY | Facility: CLINIC | Age: 60
End: 2025-06-18
Payer: MEDICARE

## 2025-06-18 NOTE — TELEPHONE ENCOUNTER
Source   Vee Navarro (Patient)    Subject   Vee Navarro (Patient)    Topic   Appointments - Appointment Scheduling      Summary   Appointment Scheduling   Communication   Type:  Patient Returning Call            Who Called:pt      Who Left Message for Patient:angelica      Does the patient know what this is regarding?:appt      Would the patient rather a call back or a response via [facility]? Call back      Best Call Back Number:[phone]      Additional Information:

## 2025-07-02 ENCOUNTER — HOSPITAL ENCOUNTER (OUTPATIENT)
Dept: CARDIOLOGY | Facility: HOSPITAL | Age: 60
Discharge: HOME | End: 2025-07-02
Attending: INTERNAL MEDICINE
Payer: MEDICARE

## 2025-07-02 VITALS — BODY MASS INDEX: 18.96 KG/M2 | WEIGHT: 118 LBS | HEIGHT: 66 IN

## 2025-07-02 DIAGNOSIS — I49.3 PVC'S (PREMATURE VENTRICULAR CONTRACTIONS): ICD-10-CM

## 2025-07-02 PROCEDURE — 93306 TTE W/DOPPLER COMPLETE: CPT | Mod: PO

## 2025-07-02 PROCEDURE — 93306 TTE W/DOPPLER COMPLETE: CPT | Mod: 26,,, | Performed by: INTERNAL MEDICINE

## 2025-07-03 ENCOUNTER — RESULTS FOLLOW-UP (OUTPATIENT)
Dept: CARDIOLOGY | Facility: CLINIC | Age: 60
End: 2025-07-03

## 2025-07-03 LAB
AORTIC SIZE INDEX (SOV): 1.6 CM/M2
AORTIC SIZE INDEX: 1.9 CM/M2
ASCENDING AORTA: 3.1 CM
AV INDEX (PROSTH): 0.39
AV MEAN GRADIENT: 9 MMHG
AV PEAK GRADIENT: 18 MMHG
AV VALVE AREA BY VELOCITY RATIO: 1.2 CM²
AV VALVE AREA: 1.1 CM²
AV VELOCITY RATIO: 0.43
BSA FOR ECHO PROCEDURE: 1.58 M2
CV ECHO LV RWT: 0.27 CM
DOP CALC AO PEAK VEL: 2.1 M/S
DOP CALC AO VTI: 52.5 CM
DOP CALC LVOT AREA: 2.8 CM2
DOP CALC LVOT DIAMETER: 1.9 CM
DOP CALC LVOT PEAK VEL: 0.9 M/S
DOP CALC LVOT STROKE VOLUME: 57.5 CM3
DOP CALCLVOT PEAK VEL VTI: 20.3 CM
E WAVE DECELERATION TIME: 221 MSEC
E/A RATIO: 1.35
E/E' RATIO: 27 M/S
ECHO LV POSTERIOR WALL: 0.7 CM (ref 0.6–1.1)
FRACTIONAL SHORTENING: 33.3 % (ref 28–44)
INTERVENTRICULAR SEPTUM: 0.8 CM (ref 0.6–1.1)
IVRT: 91 MSEC
LEFT ATRIUM AREA SYSTOLIC (APICAL 2 CHAMBER): 25.28 CM2
LEFT ATRIUM AREA SYSTOLIC (APICAL 4 CHAMBER): 24.16 CM2
LEFT ATRIUM SIZE: 4.2 CM
LEFT ATRIUM VOLUME INDEX MOD: 54 ML/M2
LEFT ATRIUM VOLUME MOD: 87 ML
LEFT INTERNAL DIMENSION IN SYSTOLE: 3.4 CM (ref 2.1–4)
LEFT VENTRICLE DIASTOLIC VOLUME INDEX: 77.5 ML/M2
LEFT VENTRICLE DIASTOLIC VOLUME: 124 ML
LEFT VENTRICLE END SYSTOLIC VOLUME APICAL 2 CHAMBER: 88.95 ML
LEFT VENTRICLE END SYSTOLIC VOLUME APICAL 4 CHAMBER: 84.51 ML
LEFT VENTRICLE MASS INDEX: 80.9 G/M2
LEFT VENTRICLE SYSTOLIC VOLUME INDEX: 30.6 ML/M2
LEFT VENTRICLE SYSTOLIC VOLUME: 49 ML
LEFT VENTRICULAR INTERNAL DIMENSION IN DIASTOLE: 5.1 CM (ref 3.5–6)
LEFT VENTRICULAR MASS: 129.4 G
LV LATERAL E/E' RATIO: 31.8 M/S
LV SEPTAL E/E' RATIO: 22.7 M/S
LVED V (TEICH): 124.46 ML
LVES V (TEICH): 48.54 ML
LVOT MG: 1.58 MMHG
LVOT MV: 0.6 CM/S
MV PEAK A VEL: 1.18 M/S
MV PEAK E VEL: 1.59 M/S
MV STENOSIS PRESSURE HALF TIME: 64.14 MS
MV VALVE AREA P 1/2 METHOD: 3.43 CM2
OHS CV RV/LV RATIO: 0.92 CM
PISA TR MAX VEL: 2.8 M/S
PULM VEIN S/D RATIO: 1.34
PV PEAK D VEL: 0.68 M/S
PV PEAK S VEL: 0.91 M/S
RA PRESSURE ESTIMATED: 3 MMHG
RA VOL SYS: 80.97 ML
RIGHT ATRIAL AREA: 22.3 CM2
RIGHT ATRIUM END SYSTOLIC VOLUME APICAL 4 CHAMBER INDEX BSA: 48.11 ML/M2
RIGHT ATRIUM VOLUME AREA LENGTH APICAL 4 CHAMBER: 76.98 ML
RIGHT VENTRICLE DIASTOLIC BASEL DIMENSION: 4.7 CM
RIGHT VENTRICLE DIASTOLIC LENGTH: 7 CM
RIGHT VENTRICLE DIASTOLIC MID DIMENSION: 3.1 CM
RIGHT VENTRICULAR END-DIASTOLIC DIMENSION: 4.65 CM
RIGHT VENTRICULAR LENGTH IN DIASTOLE (APICAL 4-CHAMBER VIEW): 7.03 CM
RV MID DIAMA: 3.07 CM
RV TB RVSP: 6 MMHG
RV TISSUE DOPPLER FREE WALL SYSTOLIC VELOCITY 1 (APICAL 4 CHAMBER VIEW): 14.39 CM/S
SINUS: 2.5 CM
STJ: 2.4 CM
TDI LATERAL: 0.05 M/S
TDI SEPTAL: 0.07 M/S
TDI: 0.06 M/S
TR MAX PG: 31 MMHG
TRICUSPID ANNULAR PLANE SYSTOLIC EXCURSION: 3.1 CM
TV REST PULMONARY ARTERY PRESSURE: 34 MMHG
Z-SCORE OF LEFT VENTRICULAR DIMENSION IN END DIASTOLE: 1.13
Z-SCORE OF LEFT VENTRICULAR DIMENSION IN END SYSTOLE: 1.47

## 2025-07-04 ENCOUNTER — ON-DEMAND VIRTUAL (OUTPATIENT)
Dept: URGENT CARE | Facility: CLINIC | Age: 60
End: 2025-07-04
Payer: MEDICARE

## 2025-07-04 DIAGNOSIS — M72.2 PLANTAR FASCIITIS, RIGHT: ICD-10-CM

## 2025-07-04 DIAGNOSIS — M79.671 PAIN OF RIGHT HEEL: Primary | ICD-10-CM

## 2025-07-04 RX ORDER — METHYLPREDNISOLONE 4 MG/1
TABLET ORAL
Qty: 21 EACH | Refills: 0 | Status: SHIPPED | OUTPATIENT
Start: 2025-07-04

## 2025-07-04 NOTE — PROGRESS NOTES
Subjective:      Patient ID: Vee Navarro is a 59 y.o. female.    Vitals:  vitals were not taken for this visit.     Chief Complaint: Foot Pain (Right heel)      Visit Type: TELE AUDIOVISUAL    Patient Location: Home     Present with the patient at the time of consultation: TELEMED PRESENT WITH PATIENT: None    Past Medical History:   Diagnosis Date    Acid reflux     Acute medial meniscus tear of right knee 04/2022    Anemia     Arthritis     Bacteremia     Chronic diastolic CHF (congestive heart failure)     Dialysis patient     resolved w/ transplant    Encounter for blood transfusion     End stage kidney disease     History of renal transplant 2021    [facility] [place] (hard of hearing)     Hypertension     Lumbar radiculopathy     Peritonitis     Renal failure     was on HD then PD then HD then kidney transplant in 2021 then ckd III    Short-term memory loss     Thyroid disease     Vitamin B12 deficiency      Past Surgical History:   Procedure Laterality Date    arm surgery Left     ARTHROSCOPY OF KNEE Right 7/26/2022    Procedure: ARTHROSCOPY, KNEE;  Surgeon: Tam Rosario MD;  Location: [facility];  Service: Orthopedics;  Laterality: Right;    BACK SURGERY  1982    CARPAL TUNNEL RELEASE Left 8/3/2020    Procedure: RELEASE, CARPAL TUNNEL;  Surgeon: MIKO Fishman MD;  Location: [facility];  Service: Orthopedics;  Laterality: Left;    COLONOSCOPY N/A 12/12/2017    Procedure: COLONOSCOPY;  Surgeon: Elpidio Casillas Jr., MD;  Location: [facility] ENDO;  Service: Endoscopy;  Laterality: N/A;    COLONOSCOPY N/A 1/25/2021    Procedure: COLONOSCOPY;  Surgeon: Elpidio Casillas Jr., MD;  Location: [facility] ENDO;  Service: Endoscopy;  Laterality: N/A;    CORONARY ANGIOGRAPHY N/A 7/14/2021    Procedure: ANGIOGRAM, CORONARY ARTERY;  Surgeon: Corey Aguilar MD;  Location: [facility] CATH;  Service: Cardiovascular;  Laterality: N/A;    DIALYSIS FISTULA CREATION      EXCISION OF MEDIAL MENISCUS OF KNEE Right 7/26/2022    Procedure: MENISCECTOMY, KNEE,  MEDIAL;  Surgeon: Tam Rosario MD;  Location: [facility];  Service: Orthopedics;  Laterality: Right;  partial     FOOT SURGERY  03/2019    FRACTURE SURGERY      KIDNEY STONE SURGERY  1983    KIDNEY TRANSPLANT Right 10/2021    KNEE ARTHROSCOPY Right 07/26/2022    right knee scope    LAPAROSCOPIC REVISION OF PROCEDURE INVOLVING PERITONEAL DIALYSIS CATHETER N/A 9/17/2018    Procedure: REVISION OF PROCEDURE INVOLVING PERITONEAL DIALYSIS CATHETER, LAPAROSCOPIC;  Surgeon: Kannan Piedra MD;  Location: [facility];  Service: General;  Laterality: N/A;    LEFT HEART CATHETERIZATION Left 7/14/2021    Procedure: Left heart cath;  Surgeon: Corey Aguilar MD;  Location: [facility] CATH;  Service: Cardiovascular;  Laterality: Left;    NEUROMA SURGERY Left     knee    OPEN REDUCTION AND INTERNAL FIXATION (ORIF) OF FRACTURE OF DISTAL RADIUS Left 8/3/2020    Procedure: ORIF, FRACTURE,/ BONE GRAFT/ ORIF Scaphoid;  Surgeon: MIKO Fishman MD;  Location: [facility];  Service: Orthopedics;  Laterality: Left;  Dr. Fishman completed his surgery at 1159    OTHER SURGICAL HISTORY      peritoneal catheter move X2    PERITONEAL CATHETER REMOVAL N/A 11/23/2019    Procedure: REMOVAL, CATHETER, DIALYSIS, PERITONEAL;  Surgeon: Edilson Sherman MD;  Location: [facility];  Service: General;  Laterality: N/A;    PLACEMENT PERITONEAL DIALYSIS CATHETER  2011     Review of patient's allergies indicates:   Allergen Reactions    Morphine      Other reaction(s): rash, vomiting     Medications Ordered Prior to Encounter[1]  Family History   Problem Relation Name Age of Onset    Mental illness Brother      Heart disease Maternal Grandmother      Cancer Maternal Grandfather      Stroke Paternal Grandmother      Cancer Paternal Grandfather      No Known Problems Brother      Arthritis Mother      Hypertension Father      Suicide Father         Medications Ordered                [id number] - [place] - [address]   [address],  [place] [address]    Telephone: [phone]   Fax: [phone]   Hours: Not open 24 hours                         E-Prescribed (1 of 1)              methylPREDNISolone (MEDROL DOSEPACK) 4 mg tablet    Sig: use as directed       Start: 7/4/25     Quantity: 21 each Refills: 0                           Ohs Peq Odvv Intake    7/4/2025 10:28 AM CDT - Filed by Patient   What is your current physical address in the event of a medical emergency? [place]   Are you able to take your vital signs? Yes   Systolic Blood Pressure: 129   Diastolic Blood Pressure: 67   Weight: 120   Height: 5.6   Pulse: 74   Temperature: 98.1   Respiration rate: 75   Pulse Oxygen: 97   Please attach any relevant images or files    Is your employer contracted with [facility]? No         Pt presents with c/o "plantar fascitis", pain and swelling of the right heel for past several weeks.  She reports she has tried all the things (tennis ball, cool towel, resting the foot",   seen in the urgent Care on 6/5/2025, Referral placed to Podiatry (noted appt to be same day as Cardiac ablation.  Denies CP, SOB, dizziness.  Noted to walk a lot on job.     Noted hx Kidney transplant 3 years ago          Constitution: Negative for fever.   Respiratory:  Negative for shortness of breath.    Musculoskeletal:  Positive for pain (to right foot/heel).   Neurological:  Negative for speech difficulty.        Objective:   The physical exam was conducted virtually.  Physical Exam   Constitutional: She is oriented to person, place, and time. No distress.   HENT:   Head: Normocephalic.   Ears:   Right Ear: External ear normal.   Left Ear: External ear normal.   Nose: No rhinorrhea or congestion.   Eyes: Conjunctivae are normal.   Neck: Neck supple.   Pulmonary/Chest: Effort normal. No respiratory distress.   Neurological: She is alert and oriented to person, place, and time.   Skin: Skin is no rash.       Assessment:     1. Pain of right heel    2. Plantar " fasciitis, right        Plan:   Discussed cool can to roll foot over  Can take 1-2 tylenol twice a day for 5 days  Try [facility] [place] shoe inserts    Will send steroid dose pack, informed cleared through the kidneys, check with transplant doctor before starting     Keep appt with Podiatry, will try to get earlier appt    Patient encouraged to monitor symptoms closely and instructed to follow-up for new or worsening symptoms. Further, in-person, evaluation may be necessary for continued treatment.     Please follow up with your primary care doctor or specialist as needed. Verbally discussed plan      Pain of right heel  -     methylPREDNISolone (MEDROL DOSEPACK) 4 mg tablet; use as directed  Dispense: 21 each; Refill: 0    Plantar fasciitis, right      We appreciate you trusting us with your medical care. We hope you feel better soon. We will be happy to take care of you for all of your future medical needs.     You must understand that you've received Virtual treatment only and that you may be released before all your medical problems are known or treated. You, the patient, will arrange for follow up care as instructed.     Follow up with your PCP or specialty clinic as directed in the next 1-2 weeks if not improved or as needed. You can call [phone] to schedule an appointment with the appropriate provider.     If your condition worsens we recommend that you receive another evaluation in person, with your primary care provider, urgent care or at the emergency room immediately or contact your primary medical clinics after hours call service to discuss your concerns.            Patient Education     Managing acute pain at home   The Basics   Written by the doctors and editors at [facility], [place]   What is acute pain? -- This means pain that lasts for a short period of time. For example, it can happen after an injury, surgery, or medical or dental procedure. It can be mild, moderate, or severe.  Acute pain is different  "from chronic pain. "Chronic" means pain that lasts longer than a few months. It can be related to many different conditions, like arthritis, back pain, or nerve pain from diabetes. Chronic pain is managed differently.  How is acute pain treated? -- The goal of treatment is to get your pain to a manageable level. It is not always possible to make pain go away completely. Your doctor will work with you to make a plan for treating your pain at home. This will depend on the cause of your pain and how severe it is, as well as your individual situation.  There are different ways to manage pain. Doctors often use more than 1 of these at a time. They include:   Non-medicine techniques - For example, it might help to put ice or heat on the painful part of your body. Resting and keeping the painful part elevated can also help. Some people use relaxation exercises or meditation to help them manage pain.   Non-opioid pain medicine - These include:   NSAIDs such as ibuprofen (sample brand names: Advil, Motrin) or naproxen (sample brand name: Aleve)   Acetaminophen, also known as paracetamol (sample brand name: Tylenol)  Doctors often recommend taking both an NSAID and acetaminophen on a regular schedule. This can help better control pain. Talk to your doctor about what medicines you should take and how much. People with some medical conditions should not take NSAIDs.   Local anesthetics - These are medicines that numb the painful part of the body. A "nerve block" is when the medicine is injected near certain nerves, which reduces pain in the area. Sometimes, the medicine is given as just 1 shot. Other times, it is given continuously through a small tube called a "catheter." The catheter attaches to a pump that can continue to give medicine after going home from the hospital.   Opioids - Opioids are a group of prescription medicines that relieve pain. They are sometimes used when other types of pain medicine do not help enough. " "But they come with risks. For this reason, doctors often try other ways of treating pain first.  What should I know about opioids? -- If your doctor prescribes opioids for your pain, there are some things that are important to know:   Opioids have side effects. For example, if you take too much of an opioid, it can cause serious problems like not breathing enough.   For treating acute pain, opioids are often prescribed as "immediate-release" pills. These work quickly. They are also available as liquids, suppositories, and shots for people who cannot swallow pills. The table shows different opioids used to treat acute pain (table 1).   Doctors usually prescribe the lowest dose possible, for the shortest amount of time possible. This usually means a few days or a week.   If your doctor prescribes an opioid, they will usually tell you to take an NSAID or acetaminophen as well. This is to help keep your pain under control so you can use less of the opioid. Some opioids come combined with acetaminophen or an NSAID in the same pill. If your medicine has both, do not take any extra NSAIDs or acetaminophen without talking to your doctor first.   If you are pregnant, talk with your doctor about your options for treating your pain. There are risks to taking opioids during pregnancy, especially if it is for more than a few days.   In some cases, taking opioids can lead to misuse or opioid use disorder:   "Opioid misuse" means taking an opioid in a way that is different than how your doctor prescribed. An example of misuse is taking the opioid when you do not need it for pain. If you take too much at once, or take opioids with alcohol or certain other drugs, it can cause serious harm or even death from overdose.   "Opioid use disorder" is the medical term for when a person can't control their use of the opioid. A person with opioid use disorder might use more medicine than they planned to. They might need higher doses to get " "the same effect that they used to get with fewer pills or a lower dose. Or they might want to stop or use opioids less often, but not be able to.   Opioids come with side effects. Some are just bothersome, and some can be dangerous. Information about what side effects to watch for, and when to call the doctor, is below.  There are things you can do to stay safe if you need to take an opioid medicine. These things help protect yourself and others:   Follow your treatment plan carefully. Take only the amount of medicine that your doctor prescribes, and only as often as they tell you to. Different people need different doses. Never take opioids that were not prescribed to you. Talk to your doctor or nurse if you think that your opioids are not helping enough with your pain.   Do not drink alcohol while you are taking opioids.   Do not take opioids with medicines that make you sleepy, unless your doctor tells you to. Examples include "benzodiazepines" like diazepam (sample brand name: Valium) or alprazolam (sample brand name: Xanax), muscle relaxants like baclofen or cyclobenzaprine, or sleeping pills like zolpidem (sample brand name: Ambien).   Do not drive a car, use dangerous machinery, or do other risky activities while taking an opioid medicine. Opioids can make you feel tired or have trouble thinking clearly.   Store your opioids in a safe place, such as a locked cabinet. This will prevent children, teens, or anyone else from getting to them.   Follow your doctor's instructions about how to stop taking your opioid once your pain has improved. This usually involves reducing the dose gradually. This is called "tapering."   When your pain gets better, get rid of any leftover medicines. Your doctor, nurse, or pharmacist can suggest ways to get rid of them. This might involve flushing them down the toilet or mixing them with something like dirt or cat litter, then putting the mixture in a sealed container in the trash. " "Some police stations and pharmacies also take leftover medicines.   Try to get all of your pain medicines from the same doctor. If that is not possible, make sure that all of your doctors know every medicine you take, even those that are non-prescription. Bring a complete list of all of your pain medicines and other medicines with you whenever you go to a doctor, nurse, dentist, or pharmacist. Ask your doctor or pharmacist if it is safe to take your other medicines with your pain medicines.  When should I call for help? -- If you are taking an opioid, it's important to be aware of possible side effects and when to get help.  Call for an ambulance (in the [place] and [place], call 9-1-1) if you took too much of an opioid medicine or think that someone is having a drug overdose. Signs of an opioid overdose include:   Extreme sleepiness   Slow breathing, or no breathing at all   Very small pupils (the black circles in the center of the eyes)   Very slow heartbeat  An opioid overdose can be treated with a medicine called "naloxone." This can save the person's life. But it needs to be given as soon as possible.  Call your doctor or nurse if you have side effects that bother you, such as:   Constipation - Your doctor or nurse might suggest that you take a laxative to prevent or treat constipation. If your bowel movements are hard and dry, a stool softener might help. Drink plenty of water.   Mild nausea or stomach discomfort - Taking the medicine with or after food can help with this.   Severe nausea, vomiting, or itchiness - If you have any of these problems, your doctor might be able to switch you to a different medicine.   Dry mouth   Feeling dizzy or sleepy, or having trouble thinking clearly   Vision problems   Being clumsy or falling down  If you suddenly stop taking your opioid after taking it on a regular schedule for several days, you might have unpleasant symptoms, called "withdrawal." These can include a stomach " "ache, diarrhea, or shakes. Doctors usually recommend reducing your dose gradually to help avoid withdrawal.  Tell your doctor or nurse if you are having trouble managing your pain or have questions about how to take your medicine.  How can I learn more about my medicine? -- For more detailed information about your medicines, ask your doctor or nurse for the patient drug information handout from [employer]. It explains how to use each medicine, describes its possible side effects, and lists other medicines or foods that can affect how it works.  All topics are updated as new evidence becomes available and our peer review process is complete.  This topic retrieved from [employer] on: May 30, 2024.  Topic 76878 Version 32.0  Release: 32.5.3 - C32.150  © 2024 UpToDate, Inc. and/or its affiliates. All rights reserved.  table 1: Opioid medicines commonly used to treat acute pain  Medicine name  Forms    Oxycodone (brand names: Oxaydo, Roxicodone)  Oxycodone with acetaminophen (brand names: Endocet, Nalocet, Percocet, Prolate) Pills  Liquids   Hydrocodone  Hydrocodone with acetaminophen (brand name: Lortab)  Hydrocodone with ibuprofen Pills  Liquids   Hydromorphone (brand name: Dilaudid) Pills  Liquids  Shots  Suppositories (small amounts of medicine that go into the rectum)   Morphine Pills  Liquids  Shots  Suppositories (small amounts of medicine that go into the rectum)   Tramadol (brand name: Qdolo)  Tramadol with acetaminophen Pills   Codeine  Codeine with acetaminophen Pills  Liquids   These are some of the opioid medicines doctors prescribe to treat acute pain. "Acute" means pain that lasts a short period of time, such as after surgery or an injury. There are other opioids, too, as well as other forms. But those are used more often for treating pain in people with long-term or "chronic" pain.  All of the medicines in this list come as "generics." Sample [place] brand names are also listed when available.  Most of the time, " "doctors prescribe "immediate-release" opioid pills for acute pain. These work fast to relieve pain. Some opioids also come as "extended-release" pills. These release medicine into the body more slowly over time. Extended-release pills are not usually prescribed to treat acute pain.  For more detailed information, ask your doctor or nurse for the patient drug information handout from [employer]. It explains how to use each medicine, describes its possible side effects, and lists other medicines or foods that can affect how it works.  Graphic 580707 Version 4.0  Consumer Information Use and Disclaimer   Disclaimer: This generalized information is a limited summary of diagnosis, treatment, and/or medication information. It is not meant to be comprehensive and should be used as a tool to help the user understand and/or assess potential diagnostic and treatment options. It does NOT include all information about conditions, treatments, medications, side effects, or risks that may apply to a specific patient. It is not intended to be medical advice or a substitute for the medical advice, diagnosis, or treatment of a health care provider based on the health care provider's examination and assessment of a patient's specific and unique circumstances. Patients must speak with a health care provider for complete information about their health, medical questions, and treatment options, including any risks or benefits regarding use of medications. This information does not endorse any treatments or medications as safe, effective, or approved for treating a specific patient. UpToDate, Inc. and its affiliates disclaim any warranty or liability relating to this information or the use thereof.The use of this information is governed by the Terms of Use, available at https://www.wolterskluwer.com/en/know/clinical-effectiveness-terms. 2024© UpToDate, Inc. and its affiliates and/or licensors. All rights reserved.  Copyright   © 2024 " UpToDate, Inc. and/or its affiliates. All rights reserved.         [1]   Current Outpatient Medications on File Prior to Visit   Medication Sig Dispense Refill    alendronate (FOSAMAX) 70 MG tablet TAKE 1 TABLET (70 MG TOTAL) BY MOUTH EVERY 7 DAYS 12 tablet 3    aspirin (ECOTRIN) 81 MG EC tablet Take 81 mg by mouth once daily.      cinacalcet (SENSIPAR) 60 MG Tab Take 60 mg by mouth daily with breakfast.      estradioL (ESTRACE) 0.01 % (0.1 mg/gram) vaginal cream Place 1 g vaginally 3 (three) times a week. Place 1 fingertip of cream at urethral opening (where you urinate from) and external vagina 3 times per week. Please do not use plastic applicator 42.5 g 3    multivit with minerals/lutein (MULTIVITAMIN 50 PLUS ORAL) Take 1 tablet by mouth once daily.      mupirocin (BACTROBAN) 2 % ointment Apply topically 3 (three) times daily. 22 g 0    mycophenolate sodium 360 MG TbEC Take 360 mg by mouth 2 (two) times daily.      ondansetron (ZOFRAN-ODT) 4 MG TbDL DISSOLVE 1 TABLET BY MOUTH EVERY 8 HOURS AS NEEDED FOR NAUSEA 30 tablet 2    pantoprazole (PROTONIX) 40 MG tablet TAKE 1 TABLET BY MOUTH 2 TIMES DAILY 60 tablet 11    tacrolimus (PROGRAF) 1 MG Cap Take 3 mg by mouth 3 (three) times daily. One am, 2 pm       Current Facility-Administered Medications on File Prior to Visit   Medication Dose Route Frequency Provider Last Rate Last Admin    0.9%  NaCl infusion  20 mL/hr Intravenous Continuous Yeimy Hickman MD 20 mL/hr at 09/17/18 0900 20 mL/hr at 09/17/18 0900    0.9%  NaCl infusion   Intravenous Continuous Albin Javier MD   Stopped at 08/03/20 1242    lidocaine (PF) 10 mg/ml (1%) injection 2 mg  0.2 mL Intradermal Once Albin Javier MD        sodium chloride 0.9% flush 10 mL  10 mL Intravenous Price Jeffery MD

## 2025-07-04 NOTE — PATIENT INSTRUCTIONS

## 2025-07-04 NOTE — Clinical Note
Hi pt with Virtual Urgent care visit today with worsening heel pain. Noted to have an appt the same day as cardiac ablation. Please see if she can get an earlier appt with Dr. Gage.  Thanks, Jillian Manzanares NP

## 2025-07-07 ENCOUNTER — OFFICE VISIT (OUTPATIENT)
Dept: ORTHOPEDICS | Facility: CLINIC | Age: 60
End: 2025-07-07
Payer: MEDICARE

## 2025-07-07 ENCOUNTER — TELEPHONE (OUTPATIENT)
Dept: ORTHOPEDICS | Facility: CLINIC | Age: 60
End: 2025-07-07
Payer: MEDICARE

## 2025-07-07 VITALS
SYSTOLIC BLOOD PRESSURE: 127 MMHG | DIASTOLIC BLOOD PRESSURE: 67 MMHG | HEIGHT: 66 IN | WEIGHT: 117.94 LBS | HEART RATE: 66 BPM | BODY MASS INDEX: 18.96 KG/M2

## 2025-07-07 DIAGNOSIS — M65.331 TRIGGER MIDDLE FINGER OF RIGHT HAND: Primary | ICD-10-CM

## 2025-07-07 PROCEDURE — 3074F SYST BP LT 130 MM HG: CPT | Mod: CPTII,S$GLB,, | Performed by: PHYSICIAN ASSISTANT

## 2025-07-07 PROCEDURE — 1159F MED LIST DOCD IN RCRD: CPT | Mod: CPTII,S$GLB,, | Performed by: PHYSICIAN ASSISTANT

## 2025-07-07 PROCEDURE — 3078F DIAST BP <80 MM HG: CPT | Mod: CPTII,S$GLB,, | Performed by: PHYSICIAN ASSISTANT

## 2025-07-07 PROCEDURE — 99213 OFFICE O/P EST LOW 20 MIN: CPT | Mod: S$GLB,,, | Performed by: PHYSICIAN ASSISTANT

## 2025-07-07 PROCEDURE — 3008F BODY MASS INDEX DOCD: CPT | Mod: CPTII,S$GLB,, | Performed by: PHYSICIAN ASSISTANT

## 2025-07-07 PROCEDURE — 99999 PR PBB SHADOW E&M-EST. PATIENT-LVL III: CPT | Mod: PBBFAC,,, | Performed by: PHYSICIAN ASSISTANT

## 2025-07-07 PROCEDURE — 1160F RVW MEDS BY RX/DR IN RCRD: CPT | Mod: CPTII,S$GLB,, | Performed by: PHYSICIAN ASSISTANT

## 2025-07-07 NOTE — TELEPHONE ENCOUNTER
Pt presented to clinic with c/o SOB. VSS /74, HR 77, 100% on room air, no acute distress noted. Pt evaluated by Constantin Frank PA-C, currently stable. Pt instructed to go to ER if she feels worsening SOB or any chest pain. Pt verbalized understanding.

## 2025-07-07 NOTE — PROGRESS NOTES
7/7/2025    HPI:  Vee Navarro is a 59 y.o. female, who presents to clinic today for continued evaluation of her right middle finger trigger finger.  States the injection she received 2.5 years ago completely resolved his symptoms until recently.  States that has symptoms are currently relatively mild.  Additionally, states she has some mild shortness of breath at the moment.  States she does have these episodes of shortness of breath occasionally and she is under the care of the cardiologist for PVCs.  States the episodes last an hour or so, and then resolve.  Denies any other complaints at this time.    PMHX:  Past Medical History:   Diagnosis Date    Acid reflux     Acute medial meniscus tear of right knee 04/2022    Anemia     Arthritis     Bacteremia     Chronic diastolic CHF (congestive heart failure)     Dialysis patient     resolved w/ transplant    Encounter for blood transfusion     End stage kidney disease     History of renal transplant 2021    [place] (hard of hearing)     Hypertension     Lumbar radiculopathy     Peritonitis     Renal failure     was on HD then PD then HD then kidney transplant in 2021 then ckd III    Short-term memory loss     Thyroid disease     Vitamin B12 deficiency        PSHX:  Past Surgical History:   Procedure Laterality Date    arm surgery Left     ARTHROSCOPY OF KNEE Right 7/26/2022    Procedure: ARTHROSCOPY, KNEE;  Surgeon: Tam Rosario MD;  Location: [facility];  Service: Orthopedics;  Laterality: Right;    BACK SURGERY  1982    CARPAL TUNNEL RELEASE Left 8/3/2020    Procedure: RELEASE, CARPAL TUNNEL;  Surgeon: MIKO Fishman MD;  Location: [facility];  Service: Orthopedics;  Laterality: Left;    COLONOSCOPY N/A 12/12/2017    Procedure: COLONOSCOPY;  Surgeon: Elpidio Casillas Jr., MD;  Location: [facility];  Service: Endoscopy;  Laterality: N/A;    COLONOSCOPY N/A 1/25/2021    Procedure: COLONOSCOPY;  Surgeon: Elpidio Casillas Jr., MD;  Location: [facility];  Service:  Endoscopy;  Laterality: N/A;    CORONARY ANGIOGRAPHY N/A 7/14/2021    Procedure: ANGIOGRAM, CORONARY ARTERY;  Surgeon: Corey Aguilar MD;  Location: [facility] CATH;  Service: Cardiovascular;  Laterality: N/A;    DIALYSIS FISTULA CREATION      EXCISION OF MEDIAL MENISCUS OF KNEE Right 7/26/2022    Procedure: MENISCECTOMY, KNEE, MEDIAL;  Surgeon: Tam Rosario MD;  Location: [facility];  Service: Orthopedics;  Laterality: Right;  partial     FOOT SURGERY  03/2019    FRACTURE SURGERY      KIDNEY STONE SURGERY  1983    KIDNEY TRANSPLANT Right 10/2021    KNEE ARTHROSCOPY Right 07/26/2022    right knee scope    LAPAROSCOPIC REVISION OF PROCEDURE INVOLVING PERITONEAL DIALYSIS CATHETER N/A 9/17/2018    Procedure: REVISION OF PROCEDURE INVOLVING PERITONEAL DIALYSIS CATHETER, LAPAROSCOPIC;  Surgeon: Kannan Piedra MD;  Location: [facility];  Service: General;  Laterality: N/A;    LEFT HEART CATHETERIZATION Left 7/14/2021    Procedure: Left heart cath;  Surgeon: Corey Aguilar MD;  Location: [facility] CATH;  Service: Cardiovascular;  Laterality: Left;    NEUROMA SURGERY Left     knee    OPEN REDUCTION AND INTERNAL FIXATION (ORIF) OF FRACTURE OF DISTAL RADIUS Left 8/3/2020    Procedure: ORIF, FRACTURE,/ BONE GRAFT/ ORIF Scaphoid;  Surgeon: MIKO Fishman MD;  Location: [facility];  Service: Orthopedics;  Laterality: Left;  Dr. Fishman completed his surgery at 1159    OTHER SURGICAL HISTORY      peritoneal catheter move X2    PERITONEAL CATHETER REMOVAL N/A 11/23/2019    Procedure: REMOVAL, CATHETER, DIALYSIS, PERITONEAL;  Surgeon: Edilson Sherman MD;  Location: [facility];  Service: General;  Laterality: N/A;    PLACEMENT PERITONEAL DIALYSIS CATHETER  2011       FMHX:  Family History   Problem Relation Name Age of Onset    Mental illness Brother      Heart disease Maternal Grandmother      Cancer Maternal Grandfather      Stroke Paternal Grandmother      Cancer Paternal Grandfather      No Known Problems Brother      Arthritis  "Mother      Hypertension Father      Suicide Father         SOCHX:  Social History     Tobacco Use    Smoking status: Former     Current packs/day: 0.00     Average packs/day: 0.7 packs/day for 42.3 years (27.9 ttl pk-yrs)     Types: Cigarettes     Start date:      Quit date: 2020     Years since quittin.1     Passive exposure: Never    Smokeless tobacco: Never    Tobacco comments:     quit a couple of times previously   Substance Use Topics    Alcohol use: Yes     Comment: occasionally       ALLERGIES:  Morphine    CURRENT MEDICATIONS:  Medications Ordered Prior to Encounter[1]    REVIEW OF SYSTEMS:  Review of Systems Complete; Negative, unless noted above.    GENERAL PHYSICAL EXAM:   /67   Pulse 66   Ht 5' 6" (1.676 m)   Wt 53.5 kg (117 lb 15.1 oz)   LMP  (LMP Unknown)   BMI 19.04 kg/m²    GEN: well developed, well nourished, no acute distress   PULM: No wheezing, no respiratory distress   CV:  Regular rate, irregular rhythm    ORTHO EXAM:   Examination of the right hand reveals mild edema over the A1 pulley of the right middle finger.  No active triggering of the right middle finger, but that has active catching of the flexor tendon sheath at the level A1 pulley.  Presence of what appears to be a ganglion cyst overlying the A1 pulley of the right middle finger.  Mild tenderness palpation of the this area.  Normal sensation in the radial, ulnar, median nerve distributions.  Capillary refill is than 2 seconds.    RADIOLOGY:   None.    ASSESSMENT:   Right middle finger trigger finger    PLAN:  1. I discussed with Vee Navarro that has available treatment options at this time are to monitor the finger or attempting of the steroid injection.  She elected to monitor the finger at this time.  We did discuss for her shortness of breath to closely monitor in his development of worsening shortness of breath, chest pain, dizziness.  We did discuss for any of the signs to report to the emergency " depart immediately.  We did discuss if she continues to have shortness of breath longer than the next hour or so to report to nearest emergency department.  She verbalized understanding and verbally agreed with the treatment plan.  She verbalized understanding and verbally agreed with the treatment plan.      2. I would like to have her follow up in clinic on a PRN basis.  She was instructed to contact the clinic for any problems or concerns in the interim.           [1]   Current Outpatient Medications on File Prior to Visit   Medication Sig Dispense Refill    alendronate (FOSAMAX) 70 MG tablet TAKE 1 TABLET (70 MG TOTAL) BY MOUTH EVERY 7 DAYS 12 tablet 3    aspirin (ECOTRIN) 81 MG EC tablet Take 81 mg by mouth once daily.      cinacalcet (SENSIPAR) 60 MG Tab Take 60 mg by mouth daily with breakfast.      estradioL (ESTRACE) 0.01 % (0.1 mg/gram) vaginal cream Place 1 g vaginally 3 (three) times a week. Place 1 fingertip of cream at urethral opening (where you urinate from) and external vagina 3 times per week. Please do not use plastic applicator 42.5 g 3    methylPREDNISolone (MEDROL DOSEPACK) 4 mg tablet use as directed 21 each 0    multivit with minerals/lutein (MULTIVITAMIN 50 PLUS ORAL) Take 1 tablet by mouth once daily.      mupirocin (BACTROBAN) 2 % ointment Apply topically 3 (three) times daily. 22 g 0    mycophenolate sodium 360 MG TbEC Take 360 mg by mouth 2 (two) times daily.      ondansetron (ZOFRAN-ODT) 4 MG TbDL DISSOLVE 1 TABLET BY MOUTH EVERY 8 HOURS AS NEEDED FOR NAUSEA 30 tablet 2    pantoprazole (PROTONIX) 40 MG tablet TAKE 1 TABLET BY MOUTH 2 TIMES DAILY 60 tablet 11    tacrolimus (PROGRAF) 1 MG Cap Take 3 mg by mouth 3 (three) times daily. One am, 2 pm       Current Facility-Administered Medications on File Prior to Visit   Medication Dose Route Frequency Provider Last Rate Last Admin    0.9%  NaCl infusion  20 mL/hr Intravenous Continuous Yeimy Hickman MD 20 mL/hr at 09/17/18 0900 20  mL/hr at 09/17/18 0900    0.9%  NaCl infusion   Intravenous Continuous Albin Javier MD   Stopped at 08/03/20 1242    lidocaine (PF) 10 mg/ml (1%) injection 2 mg  0.2 mL Intradermal Once Albin Javier MD        sodium chloride 0.9% flush 10 mL  10 mL Intravenous PRN Price Sands MD

## 2025-07-08 DIAGNOSIS — M25.579 ANKLE PAIN, UNSPECIFIED CHRONICITY, UNSPECIFIED LATERALITY: Primary | ICD-10-CM

## 2025-07-14 ENCOUNTER — TELEPHONE (OUTPATIENT)
Dept: CARDIOLOGY | Facility: CLINIC | Age: 60
End: 2025-07-14
Payer: MEDICARE

## 2025-07-14 ENCOUNTER — PATIENT MESSAGE (OUTPATIENT)
Dept: PODIATRY | Facility: CLINIC | Age: 60
End: 2025-07-14
Payer: MEDICARE

## 2025-07-14 ENCOUNTER — TELEPHONE (OUTPATIENT)
Dept: PODIATRY | Facility: CLINIC | Age: 60
End: 2025-07-14
Payer: MEDICARE

## 2025-07-14 DIAGNOSIS — Z01.818 PRE-OP TESTING: Primary | ICD-10-CM

## 2025-07-14 DIAGNOSIS — I49.3 PVC'S (PREMATURE VENTRICULAR CONTRACTIONS): Primary | ICD-10-CM

## 2025-07-14 DIAGNOSIS — I50.9 CHRONIC CONGESTIVE HEART FAILURE, UNSPECIFIED HEART FAILURE TYPE: ICD-10-CM

## 2025-07-14 NOTE — TELEPHONE ENCOUNTER
----- Message from Price Sands MD sent at 7/10/2025  9:11 PM CDT -----  Please let the patient know I reviewed her labs.  Overall they look good.  Her platelet count is very mildly decreased.  I would like to check a CBC the morning of her ablation to ensure this is   stable.  Thank you.  ----- Message -----  From: Julio C, [facility]  Sent: 7/9/2025  12:38 PM CDT  To: Price Sands MD

## 2025-07-14 NOTE — TELEPHONE ENCOUNTER
Called patient to let her know that one of the scheduled appointments would be cancelled and sent a message via [employer]. Phone went to [place] and a message was left for a call back.

## 2025-07-14 NOTE — TELEPHONE ENCOUNTER
Called patient to reschedule/confirm appointment. Phone went to voicemail and a message was left that one of the scheduled appointments would be cancelled.

## 2025-07-15 ENCOUNTER — TELEPHONE (OUTPATIENT)
Dept: ORTHOPEDICS | Facility: CLINIC | Age: 60
End: 2025-07-15
Payer: MEDICARE

## 2025-07-15 NOTE — TELEPHONE ENCOUNTER
Called the patient to respond to her message. Phone went to voicemail and a message was left for a call back.

## 2025-07-17 ENCOUNTER — TELEPHONE (OUTPATIENT)
Dept: CARDIOLOGY | Facility: CLINIC | Age: 60
End: 2025-07-17
Payer: MEDICARE

## 2025-07-17 ENCOUNTER — PATIENT MESSAGE (OUTPATIENT)
Dept: CARDIOLOGY | Facility: CLINIC | Age: 60
End: 2025-07-17
Payer: MEDICARE

## 2025-07-17 DIAGNOSIS — R00.2 PALPITATIONS: ICD-10-CM

## 2025-07-17 DIAGNOSIS — I49.3 FREQUENT PVCS: ICD-10-CM

## 2025-07-17 DIAGNOSIS — I49.3 PVC'S (PREMATURE VENTRICULAR CONTRACTIONS): Primary | ICD-10-CM

## 2025-07-17 PROBLEM — I47.29 NSVT (NONSUSTAINED VENTRICULAR TACHYCARDIA): Status: ACTIVE | Noted: 2025-07-17

## 2025-07-17 NOTE — TELEPHONE ENCOUNTER
Copied from CRM #[id number]. Topic: Appointments - Appointment Access  >> Jul 17, 2025  8:20 AM Alcira wrote:  Type:  Sooner Appointment Request    Caller is requesting a sooner appointment.  Caller declined first available appointment listed below.  Caller will not accept being placed on the waitlist and is requesting a message be sent to doctor.    Name of Caller:  [facility]   When is the first available appointment?  Sept 11   Symptoms:  hosp f/u   Would the patient rather a call back or a response via [facility]? Call   Best Call Back Number:  [phone]    Additional Information:  please advise

## 2025-07-17 NOTE — TELEPHONE ENCOUNTER
Spoke with patient who is en route to her home and informed her that all discharge instructions and restrictions are in her after visit summary given to her upon discharge. Patient verbalized understanding. Advised to call clinic back with any questions or concerns.

## 2025-07-24 ENCOUNTER — TELEPHONE (OUTPATIENT)
Dept: CARDIOLOGY | Facility: CLINIC | Age: 60
End: 2025-07-24
Payer: MEDICARE

## 2025-07-24 NOTE — TELEPHONE ENCOUNTER
Pt wants to know if its normal to have a lump near the incision and very bad bruising near the groin.

## 2025-07-25 ENCOUNTER — TELEPHONE (OUTPATIENT)
Dept: PODIATRY | Facility: CLINIC | Age: 60
End: 2025-07-25
Payer: MEDICARE

## 2025-07-25 NOTE — TELEPHONE ENCOUNTER
Copied from CRM #[id number]. Topic: Appointments - Same Day Access  >> Jul 25, 2025  7:16 AM Pancho wrote:  Type:  Same Day Appointment Request    Caller is requesting a same day appointment.  Caller declined first available appointment listed below.    Name of Caller:Pt   When is the first available appointment?8/7  Symptoms:ankle pain  Best Call Back Number:[phone]   Additional Information: Pt req to be seen today because of the pain. Pt adv she can't put pressure on the ankle. She also adv she is about to lose her job from missing too much work. Pls call back and adv. Thank you.

## 2025-07-25 NOTE — TELEPHONE ENCOUNTER
Spoke with pt and she v/u re scheduled appt next week in [place] as [facility] providers are fully booked until mid August

## 2025-08-04 ENCOUNTER — PATIENT MESSAGE (OUTPATIENT)
Dept: PSYCHIATRY | Facility: CLINIC | Age: 60
End: 2025-08-04
Payer: MEDICARE

## 2025-08-04 ENCOUNTER — HOSPITAL ENCOUNTER (OUTPATIENT)
Dept: CARDIOLOGY | Facility: HOSPITAL | Age: 60
Discharge: HOME | End: 2025-08-04
Attending: INTERNAL MEDICINE
Payer: MEDICARE

## 2025-08-04 DIAGNOSIS — I49.3 FREQUENT PVCS: ICD-10-CM

## 2025-08-04 DIAGNOSIS — I49.3 PVC'S (PREMATURE VENTRICULAR CONTRACTIONS): ICD-10-CM

## 2025-08-04 DIAGNOSIS — R00.2 PALPITATIONS: ICD-10-CM

## 2025-08-04 PROCEDURE — 93242 EXT ECG>48HR<7D RECORDING: CPT | Mod: PO

## 2025-08-05 ENCOUNTER — PATIENT MESSAGE (OUTPATIENT)
Dept: PODIATRY | Facility: CLINIC | Age: 60
End: 2025-08-05
Payer: MEDICARE

## 2025-08-05 ENCOUNTER — PATIENT OUTREACH (OUTPATIENT)
Dept: PSYCHIATRY | Facility: CLINIC | Age: 60
End: 2025-08-05
Payer: MEDICARE

## 2025-08-18 ENCOUNTER — PATIENT MESSAGE (OUTPATIENT)
Dept: CARDIOLOGY | Facility: CLINIC | Age: 60
End: 2025-08-18
Payer: MEDICARE

## 2025-08-26 ENCOUNTER — OFFICE VISIT (OUTPATIENT)
Dept: PODIATRY | Facility: CLINIC | Age: 60
End: 2025-08-26
Payer: MEDICARE

## 2025-08-26 DIAGNOSIS — M72.2 PLANTAR FASCIITIS: Primary | ICD-10-CM

## 2025-08-26 DIAGNOSIS — M62.461 GASTROCNEMIUS EQUINUS OF RIGHT LOWER EXTREMITY: ICD-10-CM

## 2025-08-26 PROCEDURE — 99999 PR PBB SHADOW E&M-EST. PATIENT-LVL II: CPT | Mod: PBBFAC,,, | Performed by: PODIATRIST

## 2025-08-26 PROCEDURE — 1159F MED LIST DOCD IN RCRD: CPT | Mod: CPTII,S$GLB,, | Performed by: PODIATRIST

## 2025-08-26 PROCEDURE — 99213 OFFICE O/P EST LOW 20 MIN: CPT | Mod: S$GLB,,, | Performed by: PODIATRIST
